# Patient Record
Sex: MALE | Race: WHITE | Employment: FULL TIME | URBAN - METROPOLITAN AREA
[De-identification: names, ages, dates, MRNs, and addresses within clinical notes are randomized per-mention and may not be internally consistent; named-entity substitution may affect disease eponyms.]

---

## 2019-02-14 ENCOUNTER — HOSPITAL ENCOUNTER (OUTPATIENT)
Dept: PULMONOLOGY | Age: 64
Discharge: HOME | End: 2019-02-14
Payer: COMMERCIAL

## 2019-02-14 ENCOUNTER — HOSPITAL ENCOUNTER (OUTPATIENT)
Dept: GENERAL RADIOLOGY | Age: 64
Discharge: HOME | End: 2019-02-16
Payer: COMMERCIAL

## 2019-02-14 ENCOUNTER — HOSPITAL ENCOUNTER (OUTPATIENT)
Dept: CT IMAGING | Age: 64
Discharge: HOME | End: 2019-02-14
Payer: COMMERCIAL

## 2019-02-14 ENCOUNTER — HOSPITAL ENCOUNTER (OUTPATIENT)
Age: 64
Discharge: HOME | End: 2019-02-16
Payer: COMMERCIAL

## 2019-02-14 DIAGNOSIS — J44.1 ACUTE EXACERBATION OF CHRONIC OBSTRUCTIVE PULMONARY DISEASE (HCC): ICD-10-CM

## 2019-02-14 DIAGNOSIS — R91.1 LUNG NODULE: ICD-10-CM

## 2019-02-14 PROCEDURE — 71046 X-RAY EXAM CHEST 2 VIEWS: CPT

## 2019-02-14 PROCEDURE — 71270 CT THORAX DX C-/C+: CPT

## 2019-02-14 PROCEDURE — 6360000004 HC RX CONTRAST MEDICATION: Performed by: RADIOLOGY

## 2019-02-14 PROCEDURE — 94726 PLETHYSMOGRAPHY LUNG VOLUMES: CPT

## 2019-02-14 PROCEDURE — 94060 EVALUATION OF WHEEZING: CPT

## 2019-02-14 PROCEDURE — 94729 DIFFUSING CAPACITY: CPT

## 2019-02-14 RX ADMIN — IOPAMIDOL 80 ML: 755 INJECTION, SOLUTION INTRAVENOUS at 09:16

## 2019-03-07 ENCOUNTER — HOSPITAL ENCOUNTER (OUTPATIENT)
Age: 64
Discharge: HOME | End: 2019-03-09
Payer: COMMERCIAL

## 2019-03-07 ENCOUNTER — HOSPITAL ENCOUNTER (OUTPATIENT)
Dept: GENERAL RADIOLOGY | Age: 64
Discharge: HOME | End: 2019-03-09
Payer: COMMERCIAL

## 2019-03-07 DIAGNOSIS — M25.571 RIGHT ANKLE PAIN, UNSPECIFIED CHRONICITY: ICD-10-CM

## 2019-03-07 PROCEDURE — 73610 X-RAY EXAM OF ANKLE: CPT

## 2019-10-08 ENCOUNTER — HOSPITAL ENCOUNTER (OUTPATIENT)
Dept: GENERAL RADIOLOGY | Age: 64
Discharge: HOME | End: 2019-10-10
Payer: COMMERCIAL

## 2019-10-08 ENCOUNTER — HOSPITAL ENCOUNTER (OUTPATIENT)
Age: 64
Discharge: HOME | End: 2019-10-10
Payer: COMMERCIAL

## 2019-10-08 DIAGNOSIS — M25.551 PAIN OF RIGHT HIP JOINT: ICD-10-CM

## 2019-10-08 DIAGNOSIS — M54.16 LUMBAR RADICULOPATHY: ICD-10-CM

## 2019-10-08 PROCEDURE — 73502 X-RAY EXAM HIP UNI 2-3 VIEWS: CPT

## 2019-10-08 PROCEDURE — 72100 X-RAY EXAM L-S SPINE 2/3 VWS: CPT

## 2019-10-08 PROCEDURE — 72220 X-RAY EXAM SACRUM TAILBONE: CPT

## 2019-10-29 ENCOUNTER — HOSPITAL ENCOUNTER (OUTPATIENT)
Dept: MRI IMAGING | Age: 64
Discharge: HOME | End: 2019-10-31
Payer: COMMERCIAL

## 2019-10-29 DIAGNOSIS — M54.17 RADICULOPATHY, LUMBOSACRAL REGION: ICD-10-CM

## 2019-10-29 PROCEDURE — 72148 MRI LUMBAR SPINE W/O DYE: CPT

## 2019-10-29 PROCEDURE — 72158 MRI LUMBAR SPINE W/O & W/DYE: CPT

## 2019-10-29 PROCEDURE — 6360000004 HC RX CONTRAST MEDICATION: Performed by: RADIOLOGY

## 2019-10-29 PROCEDURE — A9577 INJ MULTIHANCE: HCPCS | Performed by: RADIOLOGY

## 2019-10-29 RX ADMIN — GADOBENATE DIMEGLUMINE 15 ML: 529 INJECTION, SOLUTION INTRAVENOUS at 10:29

## 2019-11-07 ENCOUNTER — HOSPITAL ENCOUNTER (OUTPATIENT)
Dept: CT IMAGING | Age: 64
Discharge: HOME | End: 2019-11-07
Payer: COMMERCIAL

## 2019-11-07 DIAGNOSIS — Z00.00 ROUTINE GENERAL MEDICAL EXAMINATION AT A HEALTH CARE FACILITY: ICD-10-CM

## 2019-11-07 PROCEDURE — 70450 CT HEAD/BRAIN W/O DYE: CPT

## 2019-11-27 ENCOUNTER — INITIAL CONSULT (OUTPATIENT)
Dept: NEUROSURGERY | Age: 64
End: 2019-11-27
Payer: COMMERCIAL

## 2019-11-27 VITALS
SYSTOLIC BLOOD PRESSURE: 124 MMHG | DIASTOLIC BLOOD PRESSURE: 83 MMHG | HEIGHT: 70 IN | BODY MASS INDEX: 26.92 KG/M2 | HEART RATE: 59 BPM | WEIGHT: 188 LBS

## 2019-11-27 DIAGNOSIS — M54.9 MUSCULOSKELETAL BACK PAIN: Primary | ICD-10-CM

## 2019-11-27 DIAGNOSIS — M54.16 LUMBAR RADICULOPATHY, RIGHT: ICD-10-CM

## 2019-11-27 PROCEDURE — 99203 OFFICE O/P NEW LOW 30 MIN: CPT | Performed by: NEUROLOGICAL SURGERY

## 2019-11-27 RX ORDER — GABAPENTIN 300 MG/1
300 CAPSULE ORAL NIGHTLY
COMMUNITY
End: 2020-03-03 | Stop reason: ALTCHOICE

## 2019-11-27 ASSESSMENT — ENCOUNTER SYMPTOMS
BACK PAIN: 1
ABDOMINAL PAIN: 0
SHORTNESS OF BREATH: 0
PHOTOPHOBIA: 0
TROUBLE SWALLOWING: 0

## 2020-01-22 ENCOUNTER — TELEPHONE (OUTPATIENT)
Dept: PHYSICAL MEDICINE AND REHAB | Age: 65
End: 2020-01-22

## 2020-03-03 ENCOUNTER — OFFICE VISIT (OUTPATIENT)
Dept: ORTHOPEDIC SURGERY | Age: 65
End: 2020-03-03
Payer: COMMERCIAL

## 2020-03-03 VITALS — BODY MASS INDEX: 27.2 KG/M2 | HEIGHT: 70 IN | WEIGHT: 190 LBS

## 2020-03-03 PROCEDURE — 99214 OFFICE O/P EST MOD 30 MIN: CPT | Performed by: ORTHOPAEDIC SURGERY

## 2020-03-03 PROCEDURE — 20610 DRAIN/INJ JOINT/BURSA W/O US: CPT | Performed by: ORTHOPAEDIC SURGERY

## 2020-03-03 RX ORDER — ALBUTEROL SULFATE 2.5 MG/3ML
SOLUTION RESPIRATORY (INHALATION)
COMMUNITY
Start: 2020-01-28 | End: 2021-12-30 | Stop reason: SDUPTHER

## 2020-03-03 RX ORDER — TRIAMCINOLONE ACETONIDE 40 MG/ML
40 INJECTION, SUSPENSION INTRA-ARTICULAR; INTRAMUSCULAR ONCE
Status: COMPLETED | OUTPATIENT
Start: 2020-03-03 | End: 2020-03-03

## 2020-03-03 RX ORDER — KETOCONAZOLE 20 MG/G
CREAM TOPICAL
COMMUNITY
Start: 2019-12-12 | End: 2020-12-22

## 2020-03-03 RX ORDER — TIOTROPIUM BROMIDE 18 UG/1
CAPSULE ORAL; RESPIRATORY (INHALATION)
COMMUNITY
Start: 2020-02-26 | End: 2020-12-16

## 2020-03-03 RX ORDER — BUDESONIDE AND FORMOTEROL FUMARATE DIHYDRATE 160; 4.5 UG/1; UG/1
AEROSOL RESPIRATORY (INHALATION)
COMMUNITY
Start: 2020-01-28 | End: 2020-12-16

## 2020-03-03 RX ADMIN — TRIAMCINOLONE ACETONIDE 40 MG: 40 INJECTION, SUSPENSION INTRA-ARTICULAR; INTRAMUSCULAR at 12:43

## 2020-03-03 NOTE — PROGRESS NOTES
MG tablet, Take 1 tablet by mouth every 8 hours as needed for Pain, Disp: 60 tablet, Rfl: 2    Arginine 500 MG CAPS, Take 1 capsule by mouth 6 times daily, Disp: , Rfl:     Omega-3 Fatty Acids (FISH OIL) 1200 MG CAPS, Take by mouth 2 times daily, Disp: , Rfl:     Umeclidinium-Vilanterol 62.5-25 MCG/INH AEPB, Inhale into the lungs daily, Disp: , Rfl:     Multiple Vitamins-Minerals (THERAPEUTIC MULTIVITAMIN-MINERALS) tablet, Take 1 tablet by mouth 2 times daily, Disp: , Rfl:   No Known Allergies  Social History     Socioeconomic History    Marital status:      Spouse name: Not on file    Number of children: Not on file    Years of education: Not on file    Highest education level: Not on file   Occupational History    Not on file   Social Needs    Financial resource strain: Not on file    Food insecurity:     Worry: Not on file     Inability: Not on file    Transportation needs:     Medical: Not on file     Non-medical: Not on file   Tobacco Use    Smoking status: Former Smoker     Packs/day: 1.50     Start date: 1/13/2016    Smokeless tobacco: Never Used   Substance and Sexual Activity    Alcohol use: No    Drug use: No    Sexual activity: Not on file   Lifestyle    Physical activity:     Days per week: Not on file     Minutes per session: Not on file    Stress: Not on file   Relationships    Social connections:     Talks on phone: Not on file     Gets together: Not on file     Attends Bahai service: Not on file     Active member of club or organization: Not on file     Attends meetings of clubs or organizations: Not on file     Relationship status: Not on file    Intimate partner violence:     Fear of current or ex partner: Not on file     Emotionally abused: Not on file     Physically abused: Not on file     Forced sexual activity: Not on file   Other Topics Concern    Not on file   Social History Narrative    Not on file     History reviewed.  No pertinent family history. REVIEW OF SYSTEMS:     General/Constitution:  (-)weight loss, (-)fever, (-)chills, (-)weakness. Skin: (-) rash,(-) psoriasis,(-) eczema, (-)skin cancer. Musculoskeletal: (-) fractures,  (-) dislocations,(-) collagen vascular disease, (-) fibromyalgia, (-) multiple sclerosis, (-) muscular dystrophy, (-) RSD,(-) joint pain (-)swelling, (-) joint pain,swelling. Neurologic: (-) epilepsy, (-)seizures,(-) brain tumor,(-) TIA, (-)stroke, (-)headaches, (-)Parkinson disease,(-) memory loss, (-) LOC. Cardiovascular: (-) Chest pain, (-) swelling in legs/feet, (-) SOB, (-) cramping in legs/feet with walking. Respiratory: (-) SOB, (-) Coughing, (-) night sweats. GI: (-) nausea, (-) vomiting, (-) diarrhea, (-) blood in stool, (-) gastric ulcer. Psychiatric: (-) Depression, (-) Anxiety, (-) bipolar disease, (-) Alzheimer's Disease  Allergic/Immunologic: (-) allergies latex, (-) allergies metal, (-) skin sensitivity. Hematlogic: (-) anemia, (-) blood transfusion, (-) DVT/PE, (-) Clotting disorders    Subjective:    Constitution:  Ht 5' 10\" (1.778 m)   Wt 190 lb (86.2 kg)   BMI 27.26 kg/m²     Psycihatric:  The patient is alert and oriented x 3, appears to be stated age and in no distress. Respiratory:  Respiratory effort is not labored. Patient is not gasping. Palpation of the chest reveals no tactile fremitus. Skin:  Upon inspection: the skin appears warm, dry and intact. There is  a previous scar over the affected area. There is any cellulitis, lymphedema or cutaneous lesions noted in the lower extremities. Upon palpation there is no induration noted. Neurologic:  Gait: antalgic; Motor exam of the lower extremities show ; quadriceps, hamstrings, foot dorsi and plantar flexors intact R.  5/5 and L. 5/5. Deep tendon reflexes are 2/4 at the knees and 2/4 at the ankles with strong extensor hallicus longus motor strength bilaterally.  Sensory to both feet is intact to all sensory root.    Cardiovascular: The vascular exam is normal and is well perfused to distal extremities. Distal pulses DP/PT: R. 2+; L. 2+. There is cap refill noted less than two seconds in all digits. There is not edema of the bilateral lower extremities. There is not varicosities noted in the distal extremities. Lymph:  Upon palpation,  there is no lymphadenopathy noted in bilateral lower extremities. Musculoskeletal:  Gait: antalgic; examination of the nails and digits reveal no cyanosis or clubbing. Lumbar exam:  On visual inspection, there is not deformity of the spine. full range of motion, no tenderness, palpable spasm or pain on motion. Special tests: Straight Leg Raise negative, Jason test negative. Hip exam:   Upon inspection, there is not deformity noted. Upon palpation there is not tenderness. ROM: is  full and symmetrical.   Strength: Hip Flexors 5/5; Hip Abductors 5/5; Hip Adduction 5/5. Knee exam:  Right knee exam shows;  range of motion of R. Knee is 0 to 120, and L. Knee is 0 to 120. The patient does have  pain on motion, effusion is mild, there is tenderness over the  medial region, there are not any masses, there is not ligamentous instability, there is not  deformity noted. Knee exam: right positive for moderate crepitations, some mild tenderness laxity is not noted with  stress. There is not a popliteal cyst.    R. Knee:  Lachman's negative, Anterior Drawer negative, Posterior Drawer negative  Nargis's negative, Thallasy  negative,   PF grind test negative, Apprehension test negative, Patellar J sign  negative  L. Knee:  Lachman's negative, Anterior Drawer negative, Posterior Drawer negative  Nargis's negative, Thallasy  negative,   PF grind test negative, Apprehension test negative,  Patellar J sign  negative    Xray Exam:     FINDINGS: Mild tricompartmental degenerative changes are noted,   greatest in the medial compartment. There is no joint effusion.  There

## 2020-03-03 NOTE — TELEPHONE ENCOUNTER
Doing some follow up on some outstanding epidural orders (12/9/19) Attempted to contact patient at number listed, unable to leave message. No other contact information listed.

## 2020-03-06 VITALS
DIASTOLIC BLOOD PRESSURE: 92 MMHG | WEIGHT: 194 LBS | BODY MASS INDEX: 28.73 KG/M2 | HEIGHT: 69 IN | SYSTOLIC BLOOD PRESSURE: 150 MMHG | HEART RATE: 65 BPM

## 2020-03-06 RX ORDER — GABAPENTIN 300 MG/1
300 CAPSULE ORAL NIGHTLY
COMMUNITY
End: 2020-12-22

## 2020-12-14 NOTE — PROGRESS NOTES
[facility] Cardiology Progress Note  Dr. Kylah Asencio      Referring Physician: Joo Briseno MD  CHIEF COMPLAINT:   Chief Complaint   Patient presents with    Follow-up     Annual.  Patient denies any cp sob or dizziness. COPD-patient has some SOB. HISTORY OF PRESENT ILLNESS:   Patient is 72year old male who was referred to Cardiology for the evaluation and treatment of shortness of breath. Here for follow up appointment. For the last couple of weeks patient noticed worsening shortness of breath, started to gradually, has been persistent daily, no other associated symptoms, little bit different from shortness of breath) COPD, patient denies any chest pain, no lightheadedness, no dizziness, no palpitations, no pedal edema, no PND, no orthopnea, no syncope, no presyncopal episodes. His 6years old daughter who was recently diagnosed with type 1 diabetes mellitus        Past Medical History:   Diagnosis Date    Arthritis     Asthma     Breathing difficulty     COPD (chronic obstructive pulmonary disease) ([address]     Non-rheumatic tricuspid valve insufficiency     Nonrheumatic mitral (valve) insufficiency          Past Surgical History:   Procedure Laterality Date    APPENDECTOMY      HERNIA REPAIR      OTHER SURGICAL HISTORY      right wrist carpal tunnel release, right long trigger finger release    OTHER SURGICAL HISTORY Right 01/20/2016    right shoulder arthroscopy acromioplasty with decompression rotator cuff repair     SHOULDER ARTHROSCOPY  3/4/11    left rotator cuff repair         Current Outpatient Medications   Medication Sig Dispense Refill    omeprazole (PRILOSEC) 20 MG delayed release capsule Take 20 mg by mouth daily      gabapentin (NEURONTIN) 300 MG capsule Take 300 mg by mouth nightly.       albuterol (PROVENTIL) (2.5 MG/3ML) 0.083% nebulizer solution       ketoconazole (NIZORAL) 2 % cream  ibuprofen (ADVIL;MOTRIN) 800 MG tablet Take 1 tablet by mouth every 8 hours as needed for Pain 60 tablet 2    Arginine 500 MG CAPS Take 1 capsule by mouth 6 times daily      Omega-3 Fatty Acids (FISH OIL) 1200 MG CAPS Take by mouth 2 times daily      Umeclidinium-Vilanterol 62.5-25 MCG/INH AEPB Inhale into the lungs daily      Multiple Vitamins-Minerals (THERAPEUTIC MULTIVITAMIN-MINERALS) tablet Take 1 tablet by mouth 2 times daily       No current facility-administered medications for this visit.           Allergies as of 12/16/2020    (No Known Allergies)       Social History     Socioeconomic History    Marital status:      Spouse name: Not on file    Number of children: Not on file    Years of education: Not on file    Highest education level: Not on file   Occupational History    Not on file   Social Needs    Financial resource strain: Not on file    Food insecurity     Worry: Not on file     Inability: Not on file    Transportation needs     Medical: Not on file     Non-medical: Not on file   Tobacco Use    Smoking status: Former Smoker     Packs/day: 1.50     Start date: 1/13/2016    Smokeless tobacco: Never Used   Substance and Sexual Activity    Alcohol use: No     Comment: 6 cups a day     Drug use: No    Sexual activity: Not on file   Lifestyle    Physical activity     Days per week: Not on file     Minutes per session: Not on file    Stress: Not on file   Relationships    Social connections     Talks on phone: Not on file     Gets together: Not on file     Attends Congregation service: Not on file     Active member of club or organization: Not on file     Attends meetings of clubs or organizations: Not on file     Relationship status: Not on file    Intimate partner violence     Fear of current or ex partner: Not on file     Emotionally abused: Not on file     Physically abused: Not on file     Forced sexual activity: Not on file   Other Topics Concern    Not on file Social History Narrative    Not on file       History reviewed. No pertinent family history. REVIEW OF SYSTEMS:     CONSTITUTIONAL:  negative for  fevers, chills, sweats and fatigue  HEENT:  negative for  tinnitus, earaches, nasal congestion and epistaxis  RESPIRATORY:  negative for  dry cough, cough with sputum, dyspnea, wheezing and hemoptysis  GASTROINTESTINAL:  negative for nausea, vomiting, diarrhea, constipation, pruritus and jaundice  HEMATOLOGIC/LYMPHATIC:  negative for easy bruising, bleeding, lymphadenopathy and petechiae  ENDOCRINE:  negative for heat intolerance, cold intolerance, tremor, hair loss and diabetic symptoms including neither polyuria nor polydipsia nor blurred vision  MUSCULOSKELETAL:  negative for  myalgias, arthralgias, joint swelling, stiff joints and decreased range of motion  NEUROLOGICAL:  negative for memory problems, speech problems, visual disturbance, dysphagia, weakness and numbness      PHYSICAL EXAM:   Constitutional:  Awake, alert cooperative, no apparent distress, and appears stated age. HEENT:  Moist and pink mucous membranes, normocephalic, without obvious abnormality, atraumatic, normal ears and nose. NECK:  Supple, symmetrical, trachea midline, no JVD, no adenopathy, thyroid symmetric, not enlarged and no tenderness, good carotid upstroke bilaterally, no carotid bruit, skin normal.   LUNGS: No increased work of breathing, good air exchange, clear to auscultation bilaterally, no crackles or wheezing. Cardiovascular: Normal apical impulse, regular rate and rhythm, normal S1 and S2, no S3 or S4, 2/6 systolic murmur at the apex, 2/6 diastolic murmur at the right upper sternal border, 2/6 systolic murmur at the left lower sternal border, no pedal edema, good carotid upstroke bilaterally, no carotid bruit, no JVD, no abdominal pulsating masses. ABDOMEN: Soft, nontender, no hepatomegaly, no splenomegaly, bowel sound positive. CHEST:  Expands symmetrically, nontender to palpation. Musculoskeletal:  No clubbing or cyanosis. No redness, warmth, or swelling of the joints. Neurological: Alert, awake, and oriented X3   SKIN: No bruises, no bleeding, normal skin color, texture, turgor and no redness, warmth or swelling. BP (!) 116/52 (Site: Right Upper Arm, Position: Sitting, Cuff Size: Large Adult)   Pulse 64   Ht 5' 10\" (1.778 m)   Wt 196 lb (88.9 kg)   BMI 28.12 kg/m²     DATA:   I personally reviewed the visit EKG with the following interpretation: Sinus rhythm, right bundle branch block    EKG - (11/15/2019) I personally reviewed the EKG with the following interpretation: Sinus rhythm    ECHO: 3/12/2019) Normal let ventricular systolic function with normal diastolic function. Mild mitral valve regurgitation. Mild tricuspid valve regurgitation with normal RVSP.     Stress Test:   Angiography:    Cardiology Labs: BMP:    Lab Results   Component Value Date     12/15/2017    K 4.3 12/15/2017     12/15/2017    CO2 27 12/15/2017    BUN 12 12/15/2017    CREATININE 0.9 12/15/2017     CMP:    Lab Results   Component Value Date     12/15/2017    K 4.3 12/15/2017     12/15/2017    CO2 27 12/15/2017    BUN 12 12/15/2017    CREATININE 0.9 12/15/2017    PROT 6.8 12/15/2017     CBC:    Lab Results   Component Value Date    WBC 7.4 12/15/2017    RBC 5.09 12/15/2017    HGB 14.6 12/15/2017    HCT 43.5 12/15/2017    MCV 85.5 12/15/2017    RDW 13.8 12/15/2017     12/15/2017     PT/INR:  No results found for: PTINR  PT/INR Warfarin:  No components found for: PTPATWAR, PTINRWAR  PTT:  No results found for: APTT  PTT Heparin:  No components found for: APTTHEP  Magnesium:  No results found for: MG  TSH:  No results found for: TSH  TROPONIN:  No components found for: TROP  BNP:  No results found for: BNP  FASTING LIPID PANEL:    Lab Results   Component Value Date    CHOL 184 10/05/2010    HDL 52 12/15/2017 TRIG 117 10/05/2010     No orders to display     I have personally reviewed the laboratory, cardiac diagnostic and radiographic testing as outlined above:      IMPRESSION:  1: Shortness of breath: Exertional, equivalent angina?,  Will schedule for regular exercise stress test  2: Nonrheumatic mitral (valve) insufficiency: Mild             3:  Nonrheumatic tricuspid (valve) insufficiency: Mild            4: Chronic obstructive pulmonary disease, unspecified              5:  Family history of ischemic heart disease and other diseases: Father had his first heart attack at age 61               RECOMMENDATIONS:   1. Regular exercise stress test  2. Patient was strongly advised to call 911 if symptoms recur or get worse for any reason  3. Continue current treatment  4. Follow-up with  as scheduled  5. Follow-up with Dr. Susan Dietrich after his test    I have reviewed my findings and recommendations with patient    Electronically signed by Emiliano Sherwood MD on 12/16/2020 at 9:03 AM    NOTE: This report was transcribed using voice recognition software.  Every effort was made to ensure accuracy; however, inadvertent computerized transcription errors may be present

## 2020-12-16 ENCOUNTER — OFFICE VISIT (OUTPATIENT)
Dept: CARDIOLOGY CLINIC | Age: 65
End: 2020-12-16
Payer: COMMERCIAL

## 2020-12-16 ENCOUNTER — TELEPHONE (OUTPATIENT)
Dept: CARDIOLOGY | Age: 65
End: 2020-12-16

## 2020-12-16 VITALS
HEIGHT: 70 IN | SYSTOLIC BLOOD PRESSURE: 116 MMHG | DIASTOLIC BLOOD PRESSURE: 52 MMHG | WEIGHT: 196 LBS | HEART RATE: 64 BPM | BODY MASS INDEX: 28.06 KG/M2

## 2020-12-16 PROCEDURE — 93000 ELECTROCARDIOGRAM COMPLETE: CPT | Performed by: INTERNAL MEDICINE

## 2020-12-16 PROCEDURE — 99214 OFFICE O/P EST MOD 30 MIN: CPT | Performed by: INTERNAL MEDICINE

## 2020-12-16 RX ORDER — OMEPRAZOLE 20 MG/1
20 CAPSULE, DELAYED RELEASE ORAL DAILY
COMMUNITY
End: 2021-05-06 | Stop reason: SINTOL

## 2020-12-17 ENCOUNTER — HOSPITAL ENCOUNTER (OUTPATIENT)
Dept: CARDIOLOGY | Age: 65
Discharge: HOME | End: 2020-12-17
Payer: COMMERCIAL

## 2020-12-17 ENCOUNTER — HOSPITAL ENCOUNTER (OUTPATIENT)
Age: 65
Discharge: HOME | End: 2020-12-19
Payer: COMMERCIAL

## 2020-12-17 VITALS
SYSTOLIC BLOOD PRESSURE: 130 MMHG | HEART RATE: 58 BPM | OXYGEN SATURATION: 99 % | HEIGHT: 70 IN | TEMPERATURE: 96.8 F | DIASTOLIC BLOOD PRESSURE: 72 MMHG | BODY MASS INDEX: 28.06 KG/M2 | WEIGHT: 196 LBS | RESPIRATION RATE: 20 BRPM

## 2020-12-17 PROCEDURE — U0003 INFECTIOUS AGENT DETECTION BY NUCLEIC ACID (DNA OR RNA); SEVERE ACUTE RESPIRATORY SYNDROME CORONAVIRUS 2 (SARS-COV-2) (CORONAVIRUS DISEASE [COVID-19]), AMPLIFIED PROBE TECHNIQUE, MAKING USE OF HIGH THROUGHPUT TECHNOLOGIES AS DESCRIBED BY CMS-2020-01-R: HCPCS

## 2020-12-17 PROCEDURE — 78452 HT MUSCLE IMAGE SPECT MULT: CPT

## 2020-12-17 PROCEDURE — 93017 CV STRESS TEST TRACING ONLY: CPT

## 2020-12-17 RX ORDER — FLUTICASONE FUROATE, UMECLIDINIUM BROMIDE AND VILANTEROL TRIFENATATE 100; 62.5; 25 UG/1; UG/1; UG/1
POWDER RESPIRATORY (INHALATION)
COMMUNITY
Start: 2020-11-30 | End: 2021-05-06 | Stop reason: SDUPTHER

## 2020-12-17 NOTE — PROCEDURES
[address] Annieluna Holes, [facility]. [facility], [facility]  [phone]    Exercise Stress Study       Name: Radha Penldeton Account Number:  [de-identified]      :  1955          Sex: male         Date of Study:  2020    Height: 5' 10\" (177.8 cm)          Weight: 196 lb (88.9 kg)    Ordering Provider: Pablo Bartlett          PCP: Gloria Dietrich MD    Cardiologist: Pablo Bartlett              Interpreting Physician: Pablo Bartlett    Indication:   Detecting the presence and location of coronary artery disease    Clinical History:   Patient has no known history of coronary artery disease. Resting ECG:    MA int 194m sec, QRS int 138m sec, QT int 472m sec; HR 58 bpm  Normal sinus rhythm    Exercise: The patient exercised using a Luis E protocol, completing 4:11 minutes and reaching an estimated work load of 1.16 metabolic equivalents (METS). Resting HR was 58. Peak exercise heart rate was 139 ( 89% of maximum predicted heart rate for age). Baseline /72. Peak exercise /66. The blood pressure response to exercise was hypertensive      Exercise was terminated due to increased dyspnea and EKG changes. The patient experienced Shortness of breath and tightness of the chest with exercise. Pulse oximetry was used to monitor oxygen saturation during the stress test.  The study was performed on Room Air. The resting pulse oximeter was 99%. The post stress O2 saturation seen during exercise was 96 %. Exercise ECG:   The patient demonstrated occasional PVC's during exercise. With exercise, the test was abnormal.  With exercise up to 1 mm of downsloping ST depression was noted in leads I, II and III horizontal ST depression in lead V4, V5, V6 with initial changes beginning at 3 minutes into exercise, at a heart rate of 137. These changes these changes persisted 3 minutes into recovery period.   The predictive value for ischemia was intermediate     Impression:    1. Exercise EKG was abnormal with Downsloping ST depression in the inferior leads, horizontal ST depression in the anterolateral leads, with high predictive value for ischemia. 2. The patient experienced Shortness of breath with some tightness in the chest with exercise. 3. Gusman treadmill score was -9 implying intermediate risk of acute ischemic events. 4. Exercise capacity was below average. Thank you for sending your patient to this [employer].     Electronically signed by Kennedi Sheridan MD on 12/17/20 at 7:24 PM EST

## 2020-12-18 ENCOUNTER — TELEPHONE (OUTPATIENT)
Dept: CARDIOLOGY CLINIC | Age: 65
End: 2020-12-18

## 2020-12-18 NOTE — TELEPHONE ENCOUNTER
Spoke to [facility] - no prior authorization is needed for heart catheterization on December 22, 2020.       Call reference Neva Youssef [phone]

## 2020-12-19 LAB
SARS-COV-2: NOT DETECTED
SOURCE: NORMAL

## 2020-12-21 ENCOUNTER — HOSPITAL ENCOUNTER (OUTPATIENT)
Age: 65
Discharge: HOME | End: 2020-12-21
Payer: COMMERCIAL

## 2020-12-21 ENCOUNTER — HOSPITAL ENCOUNTER (OUTPATIENT)
Age: 65
Discharge: HOME | End: 2020-12-23
Payer: COMMERCIAL

## 2020-12-21 LAB
ABO/RH: NORMAL
ANION GAP SERPL CALCULATED.3IONS-SCNC: 7 MMOL/L (ref 7–16)
ANTIBODY SCREEN: NORMAL
BILIRUBIN URINE: NEGATIVE
BLOOD, URINE: NEGATIVE
BUN BLDV-MCNC: 14 MG/DL (ref 8–23)
CALCIUM SERPL-MCNC: 9.4 MG/DL (ref 8.6–10.2)
CHLORIDE BLD-SCNC: 100 MMOL/L (ref 98–107)
CLARITY: CLEAR
CO2: 29 MMOL/L (ref 22–29)
COLOR: YELLOW
CREAT SERPL-MCNC: 0.9 MG/DL (ref 0.7–1.2)
GFR AFRICAN AMERICAN: >60
GFR NON-AFRICAN AMERICAN: >60 ML/MIN/1.73
GLUCOSE BLD-MCNC: 100 MG/DL (ref 74–99)
GLUCOSE URINE: NEGATIVE MG/DL
HCT VFR BLD CALC: 41.4 % (ref 37–54)
HEMOGLOBIN: 13 G/DL (ref 12.5–16.5)
KETONES, URINE: NEGATIVE MG/DL
LEUKOCYTE ESTERASE, URINE: NEGATIVE
MCH RBC QN AUTO: 26.4 PG (ref 26–35)
MCHC RBC AUTO-ENTMCNC: 31.4 % (ref 32–34.5)
MCV RBC AUTO: 84.1 FL (ref 80–99.9)
NITRITE, URINE: NEGATIVE
PDW BLD-RTO: 14.1 FL (ref 11.5–15)
PH UA: 6.5 (ref 5–9)
PLATELET # BLD: 229 E9/L (ref 130–450)
PMV BLD AUTO: 8.9 FL (ref 7–12)
POTASSIUM SERPL-SCNC: 4.5 MMOL/L (ref 3.5–5)
PROTEIN UA: NEGATIVE MG/DL
RBC # BLD: 4.92 E12/L (ref 3.8–5.8)
SODIUM BLD-SCNC: 136 MMOL/L (ref 132–146)
SPECIFIC GRAVITY UA: 1.01 (ref 1–1.03)
UROBILINOGEN, URINE: 0.2 E.U./DL
WBC # BLD: 7.4 E9/L (ref 4.5–11.5)

## 2020-12-21 PROCEDURE — 80048 BASIC METABOLIC PNL TOTAL CA: CPT

## 2020-12-21 PROCEDURE — 81003 URINALYSIS AUTO W/O SCOPE: CPT

## 2020-12-21 PROCEDURE — 85027 COMPLETE CBC AUTOMATED: CPT

## 2020-12-21 PROCEDURE — 36415 COLL VENOUS BLD VENIPUNCTURE: CPT

## 2020-12-22 ENCOUNTER — HOSPITAL ENCOUNTER (OUTPATIENT)
Dept: GENERAL RADIOLOGY | Age: 65
Discharge: HOME | End: 2020-12-24
Payer: COMMERCIAL

## 2020-12-22 ENCOUNTER — HOSPITAL ENCOUNTER (OUTPATIENT)
Dept: CARDIAC CATH/INVASIVE PROCEDURES | Age: 65
Setting detail: OBSERVATION
Discharge: HOME | End: 2020-12-23
Attending: FAMILY MEDICINE | Admitting: FAMILY MEDICINE
Payer: COMMERCIAL

## 2020-12-22 PROBLEM — I25.10 CAD IN NATIVE ARTERY: Status: ACTIVE | Noted: 2020-12-22

## 2020-12-22 PROCEDURE — 6370000000 HC RX 637 (ALT 250 FOR IP): Performed by: INTERNAL MEDICINE

## 2020-12-22 PROCEDURE — G0379 DIRECT REFER HOSPITAL OBSERV: HCPCS

## 2020-12-22 PROCEDURE — C1887 CATHETER, GUIDING: HCPCS

## 2020-12-22 PROCEDURE — 2500000003 HC RX 250 WO HCPCS

## 2020-12-22 PROCEDURE — 71045 X-RAY EXAM CHEST 1 VIEW: CPT

## 2020-12-22 PROCEDURE — C1769 GUIDE WIRE: HCPCS

## 2020-12-22 PROCEDURE — G0378 HOSPITAL OBSERVATION PER HR: HCPCS

## 2020-12-22 PROCEDURE — 93458 L HRT ARTERY/VENTRICLE ANGIO: CPT | Performed by: INTERNAL MEDICINE

## 2020-12-22 PROCEDURE — 2580000003 HC RX 258: Performed by: INTERNAL MEDICINE

## 2020-12-22 PROCEDURE — 93005 ELECTROCARDIOGRAM TRACING: CPT | Performed by: INTERNAL MEDICINE

## 2020-12-22 PROCEDURE — 2709999900 HC NON-CHARGEABLE SUPPLY

## 2020-12-22 PROCEDURE — C1725 CATH, TRANSLUMIN NON-LASER: HCPCS

## 2020-12-22 PROCEDURE — C9600 PERC DRUG-EL COR STENT SING: HCPCS | Performed by: INTERNAL MEDICINE

## 2020-12-22 PROCEDURE — 92928 PRQ TCAT PLMT NTRAC ST 1 LES: CPT | Performed by: INTERNAL MEDICINE

## 2020-12-22 PROCEDURE — 6370000000 HC RX 637 (ALT 250 FOR IP)

## 2020-12-22 PROCEDURE — C1894 INTRO/SHEATH, NON-LASER: HCPCS

## 2020-12-22 PROCEDURE — 6360000002 HC RX W HCPCS

## 2020-12-22 PROCEDURE — C1874 STENT, COATED/COV W/DEL SYS: HCPCS

## 2020-12-22 PROCEDURE — 85347 COAGULATION TIME ACTIVATED: CPT

## 2020-12-22 PROCEDURE — 2140000000 HC CCU INTERMEDIATE R&B

## 2020-12-22 RX ORDER — ACETAMINOPHEN 325 MG/1
650 TABLET ORAL EVERY 4 HOURS PRN
Status: DISCONTINUED | OUTPATIENT
Start: 2020-12-22 | End: 2020-12-22 | Stop reason: ALTCHOICE

## 2020-12-22 RX ORDER — SODIUM CHLORIDE 0.9 % (FLUSH) 0.9 %
10 SYRINGE (ML) INJECTION PRN
Status: DISCONTINUED | OUTPATIENT
Start: 2020-12-22 | End: 2020-12-23 | Stop reason: HOSPADM

## 2020-12-22 RX ORDER — BUDESONIDE 0.25 MG/2ML
0.25 INHALANT ORAL 2 TIMES DAILY
Status: DISCONTINUED | OUTPATIENT
Start: 2020-12-22 | End: 2020-12-23 | Stop reason: HOSPADM

## 2020-12-22 RX ORDER — ACETAMINOPHEN 650 MG/1
650 SUPPOSITORY RECTAL EVERY 6 HOURS PRN
Status: DISCONTINUED | OUTPATIENT
Start: 2020-12-22 | End: 2020-12-23 | Stop reason: HOSPADM

## 2020-12-22 RX ORDER — SODIUM CHLORIDE 9 MG/ML
INJECTION, SOLUTION INTRAVENOUS CONTINUOUS
Status: DISCONTINUED | OUTPATIENT
Start: 2020-12-22 | End: 2020-12-23 | Stop reason: HOSPADM

## 2020-12-22 RX ORDER — POLYETHYLENE GLYCOL 3350 17 G/17G
17 POWDER, FOR SOLUTION ORAL DAILY PRN
Status: DISCONTINUED | OUTPATIENT
Start: 2020-12-22 | End: 2020-12-23 | Stop reason: HOSPADM

## 2020-12-22 RX ORDER — SODIUM CHLORIDE 0.9 % (FLUSH) 0.9 %
10 SYRINGE (ML) INJECTION EVERY 12 HOURS SCHEDULED
Status: DISCONTINUED | OUTPATIENT
Start: 2020-12-22 | End: 2020-12-23 | Stop reason: HOSPADM

## 2020-12-22 RX ORDER — M-VIT,TX,IRON,MINS/CALC/FOLIC 27MG-0.4MG
1 TABLET ORAL 2 TIMES DAILY
Status: DISCONTINUED | OUTPATIENT
Start: 2020-12-22 | End: 2020-12-23 | Stop reason: HOSPADM

## 2020-12-22 RX ORDER — ACETAMINOPHEN 325 MG/1
650 TABLET ORAL EVERY 6 HOURS PRN
Status: DISCONTINUED | OUTPATIENT
Start: 2020-12-22 | End: 2020-12-23 | Stop reason: HOSPADM

## 2020-12-22 RX ORDER — ATORVASTATIN CALCIUM 20 MG/1
20 TABLET, FILM COATED ORAL NIGHTLY
Status: DISCONTINUED | OUTPATIENT
Start: 2020-12-22 | End: 2020-12-23 | Stop reason: HOSPADM

## 2020-12-22 RX ORDER — PANTOPRAZOLE SODIUM 40 MG/1
40 TABLET, DELAYED RELEASE ORAL DAILY
Status: DISCONTINUED | OUTPATIENT
Start: 2020-12-22 | End: 2020-12-23 | Stop reason: HOSPADM

## 2020-12-22 RX ORDER — PROMETHAZINE HYDROCHLORIDE 25 MG/1
12.5 TABLET ORAL EVERY 6 HOURS PRN
Status: DISCONTINUED | OUTPATIENT
Start: 2020-12-22 | End: 2020-12-23 | Stop reason: HOSPADM

## 2020-12-22 RX ORDER — ALBUTEROL SULFATE 2.5 MG/3ML
2.5 SOLUTION RESPIRATORY (INHALATION) EVERY 6 HOURS PRN
Status: DISCONTINUED | OUTPATIENT
Start: 2020-12-22 | End: 2020-12-23 | Stop reason: HOSPADM

## 2020-12-22 RX ORDER — ONDANSETRON 2 MG/ML
4 INJECTION INTRAMUSCULAR; INTRAVENOUS EVERY 6 HOURS PRN
Status: DISCONTINUED | OUTPATIENT
Start: 2020-12-22 | End: 2020-12-22 | Stop reason: SDUPTHER

## 2020-12-22 RX ORDER — ARFORMOTEROL TARTRATE 15 UG/2ML
15 SOLUTION RESPIRATORY (INHALATION) 2 TIMES DAILY
Status: DISCONTINUED | OUTPATIENT
Start: 2020-12-22 | End: 2020-12-23 | Stop reason: HOSPADM

## 2020-12-22 RX ORDER — ASPIRIN 81 MG/1
81 TABLET, CHEWABLE ORAL DAILY
Status: DISCONTINUED | OUTPATIENT
Start: 2020-12-23 | End: 2020-12-23 | Stop reason: HOSPADM

## 2020-12-22 RX ORDER — IBUPROFEN 400 MG/1
800 TABLET ORAL EVERY 8 HOURS PRN
Status: DISCONTINUED | OUTPATIENT
Start: 2020-12-22 | End: 2020-12-23 | Stop reason: HOSPADM

## 2020-12-22 RX ORDER — ONDANSETRON 2 MG/ML
4 INJECTION INTRAMUSCULAR; INTRAVENOUS EVERY 6 HOURS PRN
Status: DISCONTINUED | OUTPATIENT
Start: 2020-12-22 | End: 2020-12-23 | Stop reason: HOSPADM

## 2020-12-22 RX ORDER — SODIUM CHLORIDE 9 MG/ML
INJECTION, SOLUTION INTRAVENOUS ONCE
Status: COMPLETED | OUTPATIENT
Start: 2020-12-22 | End: 2020-12-22

## 2020-12-22 RX ORDER — ASPIRIN 325 MG
325 TABLET ORAL ONCE
Status: COMPLETED | OUTPATIENT
Start: 2020-12-22 | End: 2020-12-22

## 2020-12-22 RX ORDER — NITROGLYCERIN 0.4 MG/1
0.4 TABLET SUBLINGUAL EVERY 5 MIN PRN
Status: DISCONTINUED | OUTPATIENT
Start: 2020-12-22 | End: 2020-12-23 | Stop reason: HOSPADM

## 2020-12-22 RX ADMIN — SODIUM CHLORIDE: 9 INJECTION, SOLUTION INTRAVENOUS at 19:41

## 2020-12-22 RX ADMIN — ATORVASTATIN CALCIUM 20 MG: 20 TABLET, FILM COATED ORAL at 21:58

## 2020-12-22 RX ADMIN — Medication 1 TABLET: at 21:57

## 2020-12-22 RX ADMIN — ASPIRIN 325 MG: 325 TABLET, FILM COATED ORAL at 09:42

## 2020-12-22 RX ADMIN — SODIUM CHLORIDE: 9 INJECTION, SOLUTION INTRAVENOUS at 09:42

## 2020-12-22 ASSESSMENT — PAIN SCALES - GENERAL
PAINLEVEL_OUTOF10: 0

## 2020-12-22 NOTE — H&P
Hospitalist History & Physical      PCP: Houston Abarca MD    Date of Admission: 12/22/2020    Date of Service: Pt seen/examined on 12/22/2020 and is admitted to Inpatient with expected LOS greater than two midnights due to medical therapy. Chief Complaint:      History Of Present Illness:    Mr. Marianne Mejia, a 72y.o. year old male  who  has a past medical history of Arthritis, Asthma, Breathing difficulty, CAD (coronary artery disease), COPD (chronic obstructive pulmonary disease) ([address] Non-rheumatic tricuspid valve insufficiency, and Nonrheumatic mitral (valve) insufficiency. Briefly this is a 70-year-old male who is admitted for observation status post cardiac catheterization. Patient status post successful PCI x2 drug-eluting stents to mid to distal left circumflex patient's chief complaint was exertional dyspnea and abnormal stress test.  Patient reports he has a history of COPD, CAD, asthma      Past Medical History:        Diagnosis Date    Arthritis     Asthma     Breathing difficulty     CAD (coronary artery disease)     COPD (chronic obstructive pulmonary disease) [address] [address]     Non-rheumatic tricuspid valve insufficiency     Nonrheumatic mitral (valve) insufficiency        Past Surgical History:        Procedure Laterality Date    APPENDECTOMY      COLONOSCOPY      CORONARY ANGIOPLASTY WITH STENT PLACEMENT  12/22/2020    DR. Mandel Resolute [place] DUANE 2.25x18 Mid LAD, Resolute [place] DUANE 2.5x18 Mid Cx. EF 65%    HERNIA REPAIR      OTHER SURGICAL HISTORY      right wrist carpal tunnel release, right long trigger finger release    OTHER SURGICAL HISTORY Right 01/20/2016    right shoulder arthroscopy acromioplasty with decompression rotator cuff repair     SHOULDER ARTHROSCOPY  3/4/11    left rotator cuff repair       Medications Prior to Admission:      Prior to Admission medications    Medication Sig Start Date End Date Taking?  Authorizing Provider   Philip Gomez [phone] MCG/INH AEPB inhale 1 puff by mouth and INTO THE LUNGS once daily 11/30/20  Yes Historical Provider, MD   omeprazole (PRILOSEC) 20 MG delayed release capsule Take 20 mg by mouth daily   Yes Historical Provider, MD   albuterol (PROVENTIL) (2.5 MG/3ML) 0.083% nebulizer solution  1/28/20  Yes Historical Provider, MD   ibuprofen (ADVIL;MOTRIN) 800 MG tablet Take 1 tablet by mouth every 8 hours as needed for Pain 5/2/16  Yes Kandy Fowler DO   Arginine 500 MG CAPS Take 1 capsule by mouth 6 times daily   Yes Historical Provider, MD   Omega-3 Fatty Acids (FISH OIL) 1200 MG CAPS Take by mouth 2 times daily   Yes Historical Provider, MD   Multiple Vitamins-Minerals (THERAPEUTIC MULTIVITAMIN-MINERALS) tablet Take 1 tablet by mouth 2 times daily   Yes Historical Provider, MD       Allergies:  Patient has no known allergies. Social History:    TOBACCO:   reports that he has quit smoking. He started smoking about 4 years ago. He has a 60.00 pack-year smoking history. He has never used smokeless tobacco.  ETOH:   reports no history of alcohol use. Family History:    Reviewed in detail and negative for DM, CAD, Cancer, CVA. Positive as follows\"      Problem Relation Age of Onset    Heart Disease Father     Heart Attack Father     High Blood Pressure Father     High Cholesterol Father     Diabetes Father     Arrhythmia Sister        REVIEW OF SYSTEMS:   Pertinent positives as noted in the HPI. All other systems reviewed and negative. PHYSICAL EXAM:  /73   Pulse 69   Temp 99.1 °F (37.3 °C)   Ht 5' 10\" (1.778 m)   Wt 196 lb (88.9 kg)   SpO2 98%   BMI 28.12 kg/m²   General appearance: No apparent distress, appears stated age and cooperative. HEENT: Normal cephalic, atraumatic without obvious deformity. Pupils equal, round, and reactive to light. Extra ocular muscles intact. Conjunctivae/corneas clear. Neck: Supple, with full range of motion. No jugular venous distention. Trachea midline.   Respiratory: Diminished breath sounds bilaterally but no respiratory distress or increased work of breathing  Cardiovascular: Regular rate rhythm with normal S1-S2  Abdomen: Soft, nontender  Musculoskeletal: No clubbing, cyanosis, edema of bilateral lower extremities. Brisk capillary refill. Skin: Normal skin color. No rashes or lesions. Neurologic:  Neurovascularly intact without any focal sensory/motor deficits. Cranial nerves: II-XII intact, grossly non-focal.  Psychiatric: Alert and oriented, thought content appropriate, normal insight    Reviewed EKG and CXR personally      CBC:   Recent Labs     12/21/20  0814   WBC 7.4   RBC 4.92   HGB 13.0   HCT 41.4   MCV 84.1   RDW 14.1        BMP:   Recent Labs     12/21/20  0814      K 4.5      CO2 29   BUN 14   CREATININE 0.9     LFT:  No results for input(s): PROT, ALB, ALKPHOS, ALT, AST, BILITOT, AMYLASE, LIPASE in the last 72 hours. CE:  No results for input(s): Les Day in the last 72 hours. PT/INR: No results for input(s): INR, APTT in the last 72 hours. BNP: No results for input(s): BNP in the last 72 hours.   ESR:   Lab Results   Component Value Date    SEDRATE 6 12/15/2017     CRP: No results found for: CRP  D Dimer: No results found for: DDIMER   Folate and B12: No results found for: FRGIWOIW15, No results found for: FOLATE  Lactic Acid: No results found for: LACTA  Thyroid Studies: No results found for: TSH, Coleen Ant    Oupatient labs:  Lab Results   Component Value Date    CHOL 184 10/05/2010    TRIG 117 10/05/2010    HDL 52 12/15/2017    LDLCALC 118 (H) 12/15/2017    PSA 3.42 12/15/2017       Urinalysis:    Lab Results   Component Value Date    NITRU Negative 12/21/2020    BLOODU Negative 12/21/2020    SPECGRAV 1.010 12/21/2020    GLUCOSEU Negative 12/21/2020       Imaging:  Xr Chest Portable    Result Date: 12/22/2020  EXAMINATION: ONE XRAY VIEW OF THE CHEST 12/22/2020 10:12 am COMPARISON: 02/14/2019 HISTORY: ORDERING SYSTEM PROVIDED HISTORY: chest pain TECHNOLOGIST PROVIDED HISTORY: Reason for exam:->chest pain Reason for exam:->preop What reading provider will be dictating this exam?->CRC FINDINGS: The lungs are without acute focal process. There is no effusion or pneumothorax. The cardiomediastinal silhouette is without acute process. The osseous structures are without acute process. No acute process. ASSESSMENT:    CAD native artery  Exertional dyspnea  History of COPD  History of asthma  CAD  Nonrheumatic mitral valve insufficiency  Tricuspid valve insufficiency      PLAN:    Admit for observation  Check lipid panel A1c  IV fluid hydration, morning BMP  Await recommendations from cardiology prior to discharge. Continue bronchodilators  Statin,  antiplatelet  Inpatient consulted cardiac rehab    Diet: DIET CARDIAC;  Code Status: Full Code  Surrogate decision maker confirmed with patient:   Extended Emergency Contact Information  Primary Emergency Contact: none, per pt  Home Phone: [phone]  Relation: Unknown      DVT Prophylaxis: [x]Lovenox []Heparin []PCD [] [address] []Encouraged ambulation  Disposition: []Med/Surg [x] Intermediate [] ICU/CCU  Admit status: [x] Observation [] Inpatient     +++++++++++++++++++++++++++++++++++++++++++++++++  [employer], [place]  +++++++++++++++++++++++++++++++++++++++++++++++++  NOTE: This report was transcribed using voice recognition software. Every effort was made to ensure accuracy; however, inadvertent computerized transcription errors may be present.

## 2020-12-22 NOTE — PROCEDURES
[address]                  [address] [place],  [facility]                            CARDIAC CATHETERIZATION    PATIENT NAME: Marleny Valerio                    :        1955  MED REC NO:   [id number]                            ROOM:  ACCOUNT NO:   [de-identified]                           ADMIT DATE: 2020  PROVIDER:     Debo Thayer MD    DATE OF PROCEDURE:  2020    CARDIAC CATHETERIZATION AND ANGIOPLASTY REPORT    PROCEDURES:  Left heart catheterization, selective coronary angiography,  left ventriculography, balloon angioplasty with deployment of a  drug-eluting coronary stent to the mid to distal LAD and balloon  angioplasty with the deployment of a drug-eluting coronary stent to the  mid to distal left circumflex. The procedure was done through right radial approach using ultrasound  guidance. The patient received intravenous Versed and intravenous fentanyl for  sedation. INDICATION:  Worsening exertional dyspnea. Abnormal stress test.    PRESSURES:  1. Aorta 93/52 with a mean of 68.  2.  Left ventricular systolic pressure 346. Left ventricular  end-diastolic pressure 9.  3.  There was no significant gradient across the aortic valve. CORONARY ANGIOGRAPHY:  LEFT MAIN:  The left main artery did not appear to have any significant  angiographic disease. LAD:  The left anterior descending artery is a large vessel that wraps  around the left ventricular apex. It has an eccentric 20% proximal  vessel narrowing. The LAD is excessively tortuous in its middle  segment. There is around 50% focal luminal narrowing in the mid LAD  before the large terminal diagonal branch. Just after the origin of the  large terminal diagonal branch, the LAD has around 80% to 90% luminal  narrowing. The distal LAD did not appear to have any significant  disease.     LCX:  The left circumflex takes off at an acute more than 90-degree 10 atmospheres. This was followed by advancing a 2.25 mm x 18 mm [place]  Resolute drug-eluting coronary stent which was deployed at the site at  14 atmospheres. Contrast injection after the deployment of the stent  revealed very good results without any residual stenosis with no  evidence of dissections or flaps and with BANDAR-3 flow in the vessel. Next, intervention on the left circumflex was performed. Attempt to  pass the extra support wire through the acute angulation at the takeoff  of the circumflex from the left main artery was difficult. An extra  support exchange wire was placed in the LAD for guide catheter  anchoring. A floppy wire was then used which was successfully advanced  through the acute angulation at the origin of the left circumflex and  further distally into the left circumflex through the site of occlusion  that was intended to intervene upon. The 2.0 mm x 15 mm balloon was  then advanced over the floppy wire and the floppy wire was exchanged to  an extra support exchange wire. The site of stenosis in the mid to  distal left circumflex was dilated with the 2.0 mm x 15 mm balloon which  was inflated at the site to 14 atmospheres. This was followed by  advancing a 2.5 mm x 18 mm [place] Resolute drug-eluting coronary stent  which was deployed at the site at 16 atmospheres. Contrast injection again revealed excellent results at the site without  any residual stenosis with no evidence of dissections or flaps and with  BANDAR-3 flow in the vessel. The right radial arterial sheath was removed at the end of the procedure  and a TR band was applied with adequate hemostasis and with preservation  of pulse. The patient tolerated the procedure well and left the cardiac  catheterization laboratory in a stable condition. CONCLUSIONS:  1. Coronary artery disease:  a. Left main:  No significant disease.   b.  LAD:  20% eccentric proximal vessel narrowing, 50% mid vessel disease and 80% to 90% mid to distal vessel stenosis after the large  terminal diagonal branch.  c.  LCX:  70% ostial stenosis of a small to moderate first marginal  branch. No significant disease of the large second marginal branch. 70% stenosis of the mid to distal left circumflex. d.  RCA:  Dominant vessel with around 50% to 60% ostial narrowing of the  small posterolateral branch and around 30% ostial narrowing of the large  posterior descending artery branch. 2.  Normal left ventricular size and systolic function with an estimated  ejection fraction of 65%. 3.  Successful balloon angioplasty with the deployment of drug-eluting  coronary stent to the mid to distal LAD with very good results. 4.  Successful balloon angioplasty with the deployment of drug-eluting  coronary stent to the mid to distal left circumflex with very good  results.         Eddie Anand MD    D: 12/22/2020 13:50:24       T: 12/22/2020 13:56:22     [email or web address]  Job#: [id number]     Doc#: [id number]    CC:

## 2020-12-23 VITALS
DIASTOLIC BLOOD PRESSURE: 70 MMHG | OXYGEN SATURATION: 97 % | HEART RATE: 69 BPM | RESPIRATION RATE: 20 BRPM | TEMPERATURE: 97.4 F | WEIGHT: 196 LBS | SYSTOLIC BLOOD PRESSURE: 119 MMHG | BODY MASS INDEX: 28.06 KG/M2 | HEIGHT: 70 IN

## 2020-12-23 LAB
ANION GAP SERPL CALCULATED.3IONS-SCNC: 10 MMOL/L (ref 7–16)
BUN BLDV-MCNC: 15 MG/DL (ref 8–23)
CALCIUM SERPL-MCNC: 8.1 MG/DL (ref 8.6–10.2)
CHLORIDE BLD-SCNC: 110 MMOL/L (ref 98–107)
CHOLESTEROL, TOTAL: 141 MG/DL (ref 0–199)
CO2: 20 MMOL/L (ref 22–29)
CREAT SERPL-MCNC: 0.8 MG/DL (ref 0.7–1.2)
GFR AFRICAN AMERICAN: >60
GFR NON-AFRICAN AMERICAN: >60 ML/MIN/1.73
GLUCOSE BLD-MCNC: 87 MG/DL (ref 74–99)
HBA1C MFR BLD: 5.4 % (ref 4–5.6)
HDLC SERPL-MCNC: 40 MG/DL
LDL CHOLESTEROL CALCULATED: 89 MG/DL (ref 0–99)
MAGNESIUM: 2 MG/DL (ref 1.6–2.6)
POTASSIUM REFLEX MAGNESIUM: 3.4 MMOL/L (ref 3.5–5)
SODIUM BLD-SCNC: 140 MMOL/L (ref 132–146)
TRIGL SERPL-MCNC: 61 MG/DL (ref 0–149)
VLDLC SERPL CALC-MCNC: 12 MG/DL

## 2020-12-23 PROCEDURE — 36415 COLL VENOUS BLD VENIPUNCTURE: CPT

## 2020-12-23 PROCEDURE — 83735 ASSAY OF MAGNESIUM: CPT

## 2020-12-23 PROCEDURE — 93005 ELECTROCARDIOGRAM TRACING: CPT | Performed by: INTERNAL MEDICINE

## 2020-12-23 PROCEDURE — 6360000002 HC RX W HCPCS: Performed by: FAMILY MEDICINE

## 2020-12-23 PROCEDURE — 80048 BASIC METABOLIC PNL TOTAL CA: CPT

## 2020-12-23 PROCEDURE — 83036 HEMOGLOBIN GLYCOSYLATED A1C: CPT

## 2020-12-23 PROCEDURE — G0378 HOSPITAL OBSERVATION PER HR: HCPCS

## 2020-12-23 PROCEDURE — 94640 AIRWAY INHALATION TREATMENT: CPT

## 2020-12-23 PROCEDURE — 99214 OFFICE O/P EST MOD 30 MIN: CPT | Performed by: INTERNAL MEDICINE

## 2020-12-23 PROCEDURE — 2580000003 HC RX 258: Performed by: INTERNAL MEDICINE

## 2020-12-23 PROCEDURE — 80061 LIPID PANEL: CPT

## 2020-12-23 PROCEDURE — 6360000002 HC RX W HCPCS: Performed by: INTERNAL MEDICINE

## 2020-12-23 PROCEDURE — 6370000000 HC RX 637 (ALT 250 FOR IP): Performed by: FAMILY MEDICINE

## 2020-12-23 PROCEDURE — 2580000003 HC RX 258: Performed by: FAMILY MEDICINE

## 2020-12-23 PROCEDURE — 96372 THER/PROPH/DIAG INJ SC/IM: CPT

## 2020-12-23 PROCEDURE — 6370000000 HC RX 637 (ALT 250 FOR IP): Performed by: INTERNAL MEDICINE

## 2020-12-23 PROCEDURE — 94664 DEMO&/EVAL PT USE INHALER: CPT

## 2020-12-23 RX ORDER — ATORVASTATIN CALCIUM 20 MG/1
20 TABLET, FILM COATED ORAL NIGHTLY
Qty: 30 TABLET | Refills: 3 | Status: SHIPPED | OUTPATIENT
Start: 2020-12-23 | End: 2021-03-31 | Stop reason: ALTCHOICE

## 2020-12-23 RX ORDER — ASPIRIN 81 MG/1
81 TABLET, CHEWABLE ORAL DAILY
Qty: 30 TABLET | Refills: 3 | Status: SHIPPED | OUTPATIENT
Start: 2020-12-24 | End: 2021-04-23 | Stop reason: SDUPTHER

## 2020-12-23 RX ADMIN — POTASSIUM BICARBONATE 40 MEQ: 782 TABLET, EFFERVESCENT ORAL at 12:47

## 2020-12-23 RX ADMIN — Medication 1 TABLET: at 09:31

## 2020-12-23 RX ADMIN — ENOXAPARIN SODIUM 40 MG: 40 INJECTION SUBCUTANEOUS at 09:31

## 2020-12-23 RX ADMIN — ASPIRIN 81 MG CHEWABLE TABLET 81 MG: 81 TABLET CHEWABLE at 09:31

## 2020-12-23 RX ADMIN — TICAGRELOR 90 MG: 90 TABLET ORAL at 09:31

## 2020-12-23 RX ADMIN — PANTOPRAZOLE SODIUM 40 MG: 40 TABLET, DELAYED RELEASE ORAL at 09:32

## 2020-12-23 RX ADMIN — SODIUM CHLORIDE, PRESERVATIVE FREE 10 ML: 5 INJECTION INTRAVENOUS at 09:31

## 2020-12-23 RX ADMIN — BUDESONIDE 250 MCG: 0.25 SUSPENSION RESPIRATORY (INHALATION) at 08:57

## 2020-12-23 RX ADMIN — IPRATROPIUM BROMIDE 0.5 MG: 0.5 SOLUTION RESPIRATORY (INHALATION) at 08:57

## 2020-12-23 RX ADMIN — ARFORMOTEROL TARTRATE 15 MCG: 15 SOLUTION RESPIRATORY (INHALATION) at 08:57

## 2020-12-23 RX ADMIN — SODIUM CHLORIDE, PRESERVATIVE FREE 10 ML: 5 INJECTION INTRAVENOUS at 09:30

## 2020-12-23 ASSESSMENT — PAIN SCALES - GENERAL
PAINLEVEL_OUTOF10: 0

## 2020-12-23 NOTE — PROGRESS NOTES
Patient Education:  Post PCI, Risk Factors, Recovery, Prevention for Future, Lifestyle Changes     Met with patient to review information relating to current diagnosis. Discussed the following topics:     1. CAD & Coronary Stents- A&P, cardiac cath, equipment used, heart anatomy  2. HTN- what is blood pressure, normals, how to manage BP/lifestyle changes  3. HLD-cholesterol, types, where it comes from, nutrition counseling  4. Diabetes Education- NA  5. Smoking cessation- NA  6. Active lifestyle suggestions- why activity and exercise are recommended, suggestions to start, Cardiac Rehabilitation benefits  7. Healthy eating- tips to help with Blood pressure and cholesterol management, eating regular meals, alternative food choices. 8. Stress management techniques  9. Post hospital follow up visit with PCP and cardiology  10. Reviewed medications- aspirin, P2Y12, beta blockers, statins- what they are indicated for and importance of compliance. 7day AM/PM medication planner given to patient. Health journal provided to record BP and health information. Patient information card given to patient to keep in wallet or pocket for record keeping on medical history, doctor names and numbers, medication list.      Handouts given in packet, information discussed. Questions answered. Patient verbalized understanding. Office number left with patient and encouraged to call with questions he may have about information reviewed. Time spent with patent 20 minutes.

## 2020-12-23 NOTE — PLAN OF CARE
Problem: Falls - Risk of:  Goal: Will remain free from falls  Description: Will remain free from falls  12/23/2020 0702 by Marina Cole RN  Outcome: Met This Shift  12/22/2020 2244 by Janey Loera RN  Outcome: Met This Shift  Goal: Absence of physical injury  Description: Absence of physical injury  Outcome: Met This Shift     Problem: Cardiac:  Goal: Ability to maintain an adequate cardiac output will improve  Description: Ability to maintain an adequate cardiac output will improve  12/22/2020 2244 by Janey Loera RN  Outcome: Met This Shift

## 2020-12-23 NOTE — PROGRESS NOTES
Inpatient Cardiology Progress note     PATIENT IS BEING FOLLOWED FOR: KENNETH Shaver is a 72 y.o. male Known to Dr. Malone Primer: Denies CP or SOB  OBJECTIVE: No apparent distress     ROS:  Consist: Denies fevers, chills or night sweats  Heart: Denies chest pain, palpitations, lightheadedness, dizziness or syncope  Lungs: Denies SOB, cough, wheezing, orthopnea or PND  GI: Denies abdominal pain, vomiting or diarrhea    PHYSICAL EXAM:   /70   Pulse 69   Temp 97.4 °F (36.3 °C) (Temporal)   Resp 20   Ht 5' 10\" (1.778 m)   Wt 196 lb (88.9 kg)   SpO2 97%   BMI 28.12 kg/m²    B/P Range last 24 hours: Systolic (28CEI), ISD:700 , Min:109 , XME:167    Diastolic (76QYY), EPY:67, Min:67, Max:80    CONST: Well developed, well nourished who appears stated age. Awake, alert and cooperative. No apparent distress  HEENT:   Head- Normocephalic, atraumatic   Eyes- Conjunctivae pink, anicteric  Throat- Oral mucosa pink and moist  Neck-  No stridor, trachea midline, no jugular venous distention. No carotid bruit  CHEST: Chest symmetrical and non-tender to palpation. No accessory muscle use or intercostal retractions  RESPIRATORY:  Lung sounds - clear throughout fields   CARDIOVASCULAR:     Heart Inspection- shows no noted pulsations  Heart Palpation- no heaves or thrills; PMI is non-displaced   Heart Ausculation- Regular rate and rhythm, no murmur. No s3, s4 or rub   PV: No lower extremity edema. No varicosities. Pedal pulses palpable, no clubbing or cyanosis. Right radial access site without hematoma and with preserved pulse   ABDOMEN: Soft, non-tender to light palpation. Bowel sounds present. No palpable masses no organomegaly; no abdominal bruit  MS: Good muscle strength and tone. No atrophy or abnormal movements. : Deferred  SKIN: Warm and dry no statis dermatitis or ulcers   NEURO / PSYCH: Oriented to person, place and time. Speech clear and appropriate. Follows all commands.  Pleasant affect Intake/Output Summary (Last 24 hours) at 12/23/2020 1255  Last data filed at 12/23/2020 0800  Gross per 24 hour   Intake 1356.61 ml   Output 450 ml   Net 906.61 ml       Weight:   Wt Readings from Last 3 Encounters:   12/22/20 196 lb (88.9 kg)   12/17/20 196 lb (88.9 kg)   12/16/20 196 lb (88.9 kg)     Current Inpatient Medications:   therapeutic multivitamin-minerals  1 tablet Oral BID    pantoprazole  40 mg Oral Daily    sodium chloride flush  10 mL Intravenous 2 times per day    atorvastatin  20 mg Oral Nightly    aspirin  81 mg Oral Daily    ticagrelor  90 mg Oral BID    sodium chloride flush  10 mL Intravenous 2 times per day    enoxaparin  40 mg Subcutaneous Daily    budesonide  0.25 mg Nebulization BID    Arformoterol Tartrate  15 mcg Nebulization BID    ipratropium  0.5 mg Nebulization 4x daily       IV Infusions (if any):   sodium chloride 100 mL/hr at 12/22/20 1941       DIAGNOSTIC/ LABORATORY DATA:  Labs:   CBC:   Recent Labs     12/21/20  0814   WBC 7.4   HGB 13.0   HCT 41.4        BMP:   Recent Labs     12/21/20  0814 12/23/20  0501    140   K 4.5 3.4*   CO2 29 20*   BUN 14 15   CREATININE 0.9 0.8   LABGLOM >60 >60   CALCIUM 9.4 8.1*     Mag:   Recent Labs     12/23/20  0501   MG 2.0     HgA1c:   Lab Results   Component Value Date    LABA1C 5.4 12/23/2020     FASTING LIPID PANEL:  Lab Results   Component Value Date    CHOL 141 12/23/2020    HDL 40 12/23/2020    LDLCALC 89 12/23/2020    TRIG 61 12/23/2020       Telemetry: SR    12 lead EKG 12/22/2020:     Cath/PCI 12/22/2020 ( Dr. Mariann Dunne ):  CONCLUSIONS:  1. Coronary artery disease:  a. Left main:  No significant disease. b.  LAD:  20% eccentric proximal vessel narrowing, 50% mid vessel  disease and 80% to 90% mid to distal vessel stenosis after the large  terminal diagonal branch.  c.  LCX:  70% ostial stenosis of a small to moderate first marginal  branch. No significant disease of the large second marginal branch. 70% stenosis of the mid to distal left circumflex. d.  RCA:  Dominant vessel with around 50% to 60% ostial narrowing of the  small posterolateral branch and around 30% ostial narrowing of the large  posterior descending artery branch. 2.  Normal left ventricular size and systolic function with an estimated  ejection fraction of 65%. 3.  Successful balloon angioplasty with the deployment of drug-eluting  coronary stent to the mid to distal LAD with very good results. 4.  Successful balloon angioplasty with the deployment of drug-eluting  coronary stent to the mid to distal left circumflex with very good  results.       ASSESSMENT:   1. CAD s/p PCI to LAD and LCX 12/22/2020. No CP  2. COPD / Asthma   3. Hypokalemia, received K+ supplementation       PLAN:  1. Continue current cardiac medications. Importance of compliance with DAPT emphasized  2. Rest of home medications same  2. OK for discharge from cardiology stand point.  Out patient cardiology FU with his primary cardiologist, Dr. Kylie Resendiz, made    Electronically signed by Resa Bumpers, MD on 12/23/2020 at 12:55 PM

## 2020-12-23 NOTE — DISCHARGE SUMMARY
person, place, and time. Motor and sensation grossly intact. Skin: Skin is warm and dry. No rashes, lesions. Psychiatric: has a normal mood and affect. Behavior is normal.       Consults:   IP CONSULT TO CARDIAC REHAB    Discharge Diagnoses:    CAD native artery  Exertional dyspnea  History of COPD  History of asthma  CAD  Nonrheumatic mitral valve insufficiency  Tricuspid valve insufficiency    Discharge Instructions / Follow up:    Future Appointments   Date Time Provider Negra Katie   1/5/2021 10:50 AM DO Jairo Hunter [facility]       Continued appropriate risk factor modification of blood pressure, diabetes and serum lipids will remain essential to reducing risk of future atherosclerotic development    Activity: activity as tolerated    Significant labs:  CBC:   Recent Labs     12/21/20  0814   WBC 7.4   RBC 4.92   HGB 13.0   HCT 41.4   MCV 84.1   RDW 14.1        BMP:   Recent Labs     12/21/20  0814 12/23/20  0501    140   K 4.5 3.4*    110*   CO2 29 20*   BUN 14 15   CREATININE 0.9 0.8   MG  --  2.0     LFT:  No results for input(s): PROT, ALB, ALKPHOS, ALT, AST, BILITOT, AMYLASE, LIPASE in the last 72 hours. PT/INR: No results for input(s): INR, APTT in the last 72 hours. BNP: No results for input(s): BNP in the last 72 hours.   Hgb A1C:   Lab Results   Component Value Date    LABA1C 5.4 12/23/2020     Folate and B12: No results found for: Tahira White, No results found for: FOLATE  Thyroid Studies: No results found for: TSH, C4PEYPW, G7DAWEY, THYROIDAB    Urinalysis:    Lab Results   Component Value Date    NITRU Negative 12/21/2020    BLOODU Negative 12/21/2020    SPECGRAV 1.010 12/21/2020    GLUCOSEU Negative 12/21/2020       Imaging:  Xr Chest Portable    Result Date: 12/22/2020  EXAMINATION: ONE XRAY VIEW OF THE CHEST 12/22/2020 10:12 am COMPARISON: 02/14/2019 HISTORY: ORDERING SYSTEM PROVIDED HISTORY: chest pain TECHNOLOGIST PROVIDED HISTORY: Reason for exam:->chest pain Reason for exam:->preop What reading provider will be dictating this exam?->CRC FINDINGS: The lungs are without acute focal process. There is no effusion or pneumothorax. The cardiomediastinal silhouette is without acute process. The osseous structures are without acute process. No acute process. Discharge Medications:      Medication List      START taking these medications    aspirin 81 MG chewable tablet  Take 1 tablet by mouth daily  Start taking on: December 24, 2020     atorvastatin 20 MG tablet  Commonly known as: LIPITOR  Take 1 tablet by mouth nightly     ticagrelor 90 MG Tabs tablet  Commonly known as: BRILINTA  Take 1 tablet by mouth 2 times daily        CONTINUE taking these medications    albuterol (2.5 MG/3ML) 0.083% nebulizer solution  Commonly known as: PROVENTIL     Arginine 500 MG Caps     Fish Oil 1200 MG Caps     omeprazole 20 MG delayed release capsule  Commonly known as: PRILOSEC     therapeutic multivitamin-minerals tablet     Trelegy Ellipta 100-62.5-25 MCG/INH Aepb  Generic drug: fluticasone-umeclidin-vilant        STOP taking these medications    ibuprofen 800 MG tablet  Commonly known as: ADVIL;MOTRIN           Where to Get Your Medications      You can get these medications from any pharmacy    Bring a paper prescription for each of these medications  · aspirin 81 MG chewable tablet  · atorvastatin 20 MG tablet  · ticagrelor 90 MG Tabs tablet         Time Spent on discharge is more than 45 minutes in the examination, evaluation, counseling and review of medications and discharge plan.    +++++++++++++++++++++++++++++++++++++++++++++++++  Yokasta Stout MD  [address]  +++++++++++++++++++++++++++++++++++++++++++++++++  NOTE: This report was transcribed using voice recognition software.  Every effort was made to ensure accuracy; however, inadvertent computerized transcription errors may be present.

## 2020-12-23 NOTE — CONSULTS
Met with patient and discussed that their physician has ordered a referral to our outpatient Phase II Cardiac Rehabilitation program. Reviewed the benefits of cardiac rehabilitation based on their diagnosis and personal risk factors. Patient demonstrates strong interest in Cardiac Rehabilitation at this time. Cardiac Rehabilitation brochure provided to patient/family. The Cardiac Rehabilitation Program has been provided the patient's referral information and pertinent patient details and history. The patient may call [facility] at [phone] for additional information or questions. Contact information for [facility] and other choices close to the patient's residence have been provided in the discharge instructions so that the patient may call and schedule an appointment when cleared by their physician.  Thank you for the referral.

## 2020-12-24 LAB
EKG ATRIAL RATE: 58 BPM
EKG ATRIAL RATE: 62 BPM
EKG P AXIS: 47 DEGREES
EKG P AXIS: 71 DEGREES
EKG P-R INTERVAL: 172 MS
EKG P-R INTERVAL: 186 MS
EKG Q-T INTERVAL: 470 MS
EKG Q-T INTERVAL: 482 MS
EKG QRS DURATION: 138 MS
EKG QRS DURATION: 140 MS
EKG QTC CALCULATION (BAZETT): 473 MS
EKG QTC CALCULATION (BAZETT): 477 MS
EKG R AXIS: 41 DEGREES
EKG R AXIS: 56 DEGREES
EKG T AXIS: 21 DEGREES
EKG T AXIS: 26 DEGREES
EKG VENTRICULAR RATE: 58 BPM
EKG VENTRICULAR RATE: 62 BPM
POC ACT LR: 285 SECONDS
POC ACT LR: 354 SECONDS

## 2020-12-28 ENCOUNTER — TELEPHONE (OUTPATIENT)
Dept: CARDIOLOGY CLINIC | Age: 65
End: 2020-12-28

## 2020-12-28 NOTE — TELEPHONE ENCOUNTER
Patient had a stent placement on December 22nd - he is asking if he's able to go back to work.   He works as a  no real lifting to his job

## 2021-01-05 ENCOUNTER — OFFICE VISIT (OUTPATIENT)
Dept: ORTHOPEDIC SURGERY | Age: 66
End: 2021-01-05
Payer: COMMERCIAL

## 2021-01-05 VITALS — TEMPERATURE: 98 F | HEIGHT: 70 IN | BODY MASS INDEX: 28.06 KG/M2 | WEIGHT: 196 LBS

## 2021-01-05 DIAGNOSIS — M17.11 PRIMARY OSTEOARTHRITIS OF RIGHT KNEE: Primary | ICD-10-CM

## 2021-01-05 PROCEDURE — 20610 DRAIN/INJ JOINT/BURSA W/O US: CPT | Performed by: ORTHOPAEDIC SURGERY

## 2021-01-05 PROCEDURE — 99213 OFFICE O/P EST LOW 20 MIN: CPT | Performed by: ORTHOPAEDIC SURGERY

## 2021-01-05 RX ORDER — TRIAMCINOLONE ACETONIDE 40 MG/ML
40 INJECTION, SUSPENSION INTRA-ARTICULAR; INTRAMUSCULAR ONCE
Status: COMPLETED | OUTPATIENT
Start: 2021-01-05 | End: 2021-01-05

## 2021-01-05 RX ADMIN — TRIAMCINOLONE ACETONIDE 40 MG: 40 INJECTION, SUSPENSION INTRA-ARTICULAR; INTRAMUSCULAR at 11:57

## 2021-01-05 NOTE — PROGRESS NOTES
Musculoskeletal: (-) fractures,  (-) dislocations,(-) collagen vascular disease, (-) fibromyalgia, (-) multiple sclerosis, (-) muscular dystrophy, (-) RSD,(-) joint pain (-)swelling, (-) joint pain,swelling. Neurologic: (-) epilepsy, (-)seizures,(-) brain tumor,(-) TIA, (-)stroke, (-)headaches, (-)Parkinson disease,(-) memory loss, (-) LOC. Cardiovascular: (-) Chest pain, (-) swelling in legs/feet, (-) SOB, (-) cramping in legs/feet with walking. Respiratory: (-) SOB, (-) Coughing, (-) night sweats. GI: (-) nausea, (-) vomiting, (-) diarrhea, (-) blood in stool, (-) gastric ulcer. Psychiatric: (-) Depression, (-) Anxiety, (-) bipolar disease, (-) Alzheimer's Disease  Allergic/Immunologic: (-) allergies latex, (-) allergies metal, (-) skin sensitivity. Hematlogic: (-) anemia, (-) blood transfusion, (-) DVT/PE, (-) Clotting disorders    Subjective:    Constitution:  Temp 98 °F (36.7 °C)   Ht 5' 10\" (1.778 m)   Wt 196 lb (88.9 kg)   BMI 28.12 kg/m²     Psycihatric:  The patient is alert and oriented x 3, appears to be stated age and in no distress. Respiratory:  Respiratory effort is not labored. Patient is not gasping. Palpation of the chest reveals no tactile fremitus. Skin:  Upon inspection: the skin appears warm, dry and intact. There is  a previous scar over the affected area. There is any cellulitis, lymphedema or cutaneous lesions noted in the lower extremities. Upon palpation there is no induration noted. Neurologic:  Gait: antalgic; Motor exam of the lower extremities show ; quadriceps, hamstrings, foot dorsi and plantar flexors intact R.  5/5 and L. 5/5. Deep tendon reflexes are 2/4 at the knees and 2/4 at the ankles with strong extensor hallicus longus motor strength bilaterally. Sensory to both feet is intact to all sensory root. Cardiovascular: The vascular exam is normal and is well perfused to distal extremities. Distal pulses DP/PT: R. 2+; L. 2+.   There is cap refill noted less than two seconds in all digits. There is not edema of the bilateral lower extremities. There is not varicosities noted in the distal extremities. Lymph:  Upon palpation,  there is no lymphadenopathy noted in bilateral lower extremities. Musculoskeletal:  Gait: antalgic; examination of the nails and digits reveal no cyanosis or clubbing. Lumbar exam:  On visual inspection, there is not deformity of the spine. full range of motion, no tenderness, palpable spasm or pain on motion. Special tests: Straight Leg Raise negative, Jason test negative. Hip exam:   Upon inspection, there is not deformity noted. Upon palpation there is not tenderness. ROM: is  full and symmetrical.   Strength: Hip Flexors 5/5; Hip Abductors 5/5; Hip Adduction 5/5. Knee exam:  Right knee exam shows;  range of motion of R. Knee is 0 to 120, and L. Knee is 0 to 120. The patient does have  pain on motion, effusion is mild, there is tenderness over the  medial region, there are not any masses, there is not ligamentous instability, there is not  deformity noted. Knee exam: right positive for moderate crepitations, some mild tenderness laxity is not noted with  stress. There is not a popliteal cyst.    R. Knee:  Lachman's negative, Anterior Drawer negative, Posterior Drawer negative  Nargis's negative, Thallasy  negative,   PF grind test negative, Apprehension test negative, Patellar J sign  negative  L. Knee:  Lachman's negative, Anterior Drawer negative, Posterior Drawer negative  Nargis's negative, Thallasy  negative,   PF grind test negative, Apprehension test negative,  Patellar J sign  negative    Xray Exam:     FINDINGS: Mild tricompartmental degenerative changes are noted,   greatest in the medial compartment. There is no joint effusion. There   is normal alignment. The patella is well located.  No fractures are   identified.        Radiographic findings reviewed with patient    Assessment:  Encounter Diagnoses   Name Primary?  Primary osteoarthritis of right knee Yes       Plan:  Natural history and expected course discussed. Questions answered. Educational materials distributed. Rest, ice, compression, and elevation (RICE) therapy. Reduction in offending activity. Patellar compression sleeve. I had a lengthy discussion with the patient regarding their diagnosis. I explained treatment options including surgical vs non surgical treatment. I reviewed in detail the risks and benefits and outlined the procedure in detail with expected outcomes and possible complications. I also discussed non surgical treatment such as injections (CSI and visco supplementation), physical therapy, topical creams and NSAID's. They have elected for conservative management at this time. I will proceed with a cortisone injection in the Right knee. Verbal and written consent was obtained for the injections. The skin was prepped with alcohol. 1mL of Kenalog 40mg and 9mL of 0.25% Marcaine was  injected to Right knee. The injection was given through the lateral side of the knee. The patient tolerated the injection well. I will see the patient back prn   The patient has failed conservative measures such as NSAIDS, HEP, and cortisone injections. He is an excellent candidate for viscous supplementation injections including euflexxa in the Right knee.    We will contact the patient's insurance company and see them back in the office once we have received approval.

## 2021-01-10 NOTE — PROGRESS NOTES
[facility] Cardiology Progress Note  Dr. Monique Ragland      Referring Physician: Kenny Salas MD  CHIEF COMPLAINT:   Chief Complaint   Patient presents with    Coronary Artery Disease     Post Stent. Patient denies any cp sob or dizziness. Patient states his memory is going. He is very forgetful. HISTORY OF PRESENT ILLNESS:   Patient is 72year old male CAD s/p PCI to LAD in December 2020, is here for follow-up appointment. Occasional sharp chest pain that comes and goes, shortness of breath is significantly better, denies any palpitations, no pedal edema, no PND, no orthopnea, no syncope, no presyncopal episodes       Past Medical History:   Diagnosis Date    Arthritis     Asthma     Breathing difficulty     CAD (coronary artery disease)     COPD (chronic obstructive pulmonary disease) ([facility] [address]     Non-rheumatic tricuspid valve insufficiency     Nonrheumatic mitral (valve) insufficiency          Past Surgical History:   Procedure Laterality Date    APPENDECTOMY      COLONOSCOPY      CORONARY ANGIOPLASTY WITH STENT PLACEMENT  12/22/2020    DR. Mandel Resolute [place] DUANE 2.25x18 Mid LAD, Resolute [place] DUANE 2.5x18 Mid Cx.   EF 65%    HERNIA REPAIR      OTHER SURGICAL HISTORY      right wrist carpal tunnel release, right long trigger finger release    OTHER SURGICAL HISTORY Right 01/20/2016    right shoulder arthroscopy acromioplasty with decompression rotator cuff repair     SHOULDER ARTHROSCOPY  3/4/11    left rotator cuff repair         Current Outpatient Medications   Medication Sig Dispense Refill    aspirin 81 MG chewable tablet Take 1 tablet by mouth daily 30 tablet 3    atorvastatin (LIPITOR) 20 MG tablet Take 1 tablet by mouth nightly 30 tablet 3    ticagrelor (BRILINTA) 90 MG TABS tablet Take 1 tablet by mouth 2 times daily 60 tablet 3    TRELEGY ELLIPTA 100-62.5-25 MCG/INH AEPB inhale 1 puff by mouth and INTO THE LUNGS once daily      omeprazole (PRILOSEC) 20 MG delayed release capsule nontender, no hepatomegaly, no splenomegaly, bowel sound positive. Musculoskeletal:  No clubbing or cyanosis. No redness, warmth, or swelling of the joints. Neurological: Alert, awake, and oriented X3   SKIN: No bruises, no bleeding, normal skin color, texture, turgor and no redness, warmth or swelling. /64 (Site: Right Upper Arm, Position: Sitting, Cuff Size: Large Adult)   Pulse 61   Ht 5' 10\" (1.778 m)   Wt 194 lb (88 kg)   BMI 27.84 kg/m²     DATA:   I personally reviewed the visit EKG with the following interpretation: Sinus rhythm, left bundle branch block, no changes when compared to previous    EKG - 12/23/20 Normal sinus rhythm  Right bundle branch block  No previous ECGs available    ECHO: 3/12/2019) Normal let ventricular systolic function with normal diastolic function. Mild mitral valve regurgitation. Mild tricuspid valve regurgitation with normal RVSP. Stress Test: 12/17/20 Impression:     1. Exercise EKG was abnormal with Downsloping ST depression in   the inferior leads, horizontal ST depression in the anterolateral   leads, with high predictive value for ischemia.     2. The patient experienced Shortness of breath with some   tightness in the chest with exercise. 3. Gusman treadmill score was -9 implying intermediate risk of   acute ischemic events.     4. Exercise capacity was below average. Angiography: 12/22/20 CONCLUSIONS:  1. Coronary artery disease:  a. Left main:  No significant disease. b.  LAD:  20% eccentric proximal vessel narrowing, 50% mid vessel  disease and 80% to 90% mid to distal vessel stenosis after the large  terminal diagonal branch.  c.  LCX:  70% ostial stenosis of a small to moderate first marginal  branch. No significant disease of the large second marginal branch. 70% stenosis of the mid to distal left circumflex.   d.  RCA:  Dominant vessel with around 50% to 60% ostial narrowing of the  small posterolateral branch and around 30% ostial narrowing of the large  posterior descending artery branch. 2.  Normal left ventricular size and systolic function with an estimated  ejection fraction of 65%. 3.  Successful balloon angioplasty with the deployment of drug-eluting  coronary stent to the mid to distal LAD with very good results. 4.  Successful balloon angioplasty with the deployment of drug-eluting  coronary stent to the mid to distal left circumflex with very good  results.       Cardiology Labs: BMP:    Lab Results   Component Value Date     12/23/2020    K 3.4 12/23/2020     12/23/2020    CO2 20 12/23/2020    BUN 15 12/23/2020    CREATININE 0.8 12/23/2020     CMP:    Lab Results   Component Value Date     12/23/2020    K 3.4 12/23/2020     12/23/2020    CO2 20 12/23/2020    BUN 15 12/23/2020    CREATININE 0.8 12/23/2020    PROT 6.8 12/15/2017     CBC:    Lab Results   Component Value Date    WBC 7.4 12/21/2020    RBC 4.92 12/21/2020    HGB 13.0 12/21/2020    HCT 41.4 12/21/2020    MCV 84.1 12/21/2020    RDW 14.1 12/21/2020     12/21/2020     PT/INR:  No results found for: PTINR  PT/INR Warfarin:  No components found for: PTPATWAR, PTINRWAR  PTT:  No results found for: APTT  PTT Heparin:  No components found for: APTTHEP  Magnesium:    Lab Results   Component Value Date    MG 2.0 12/23/2020     TSH:  No results found for: TSH  TROPONIN:  No components found for: TROP  BNP:  No results found for: BNP  FASTING LIPID PANEL:    Lab Results   Component Value Date    CHOL 141 12/23/2020    HDL 40 12/23/2020    TRIG 61 12/23/2020     No orders to display     I have personally reviewed the laboratory, cardiac diagnostic and radiographic testing as outlined above:      IMPRESSION:  1: CAD: Patient had cardiac catheterization on 12/22/2020 with the following findings:  # Left main:  No significant disease.   # LAD:  20% eccentric proximal vessel narrowing, 50% mid vessel disease and 80% to 90% mid to distal vessel stenosis after the large terminal diagonal branch. # LCX:  70% ostial stenosis of a small to moderate first marginal branch. No significant disease of the large second marginal branch. 70% stenosis of the mid to distal left circumflex. # RCA:  Dominant vessel with around 50% to 60% ostial narrowing of the small posterolateral branch and around 30% ostial narrowing of the large posterior descending artery branch. # Normal left ventricular size and systolic function with an estimated ejection fraction of 65%. # Successful balloon angioplasty with the deployment of drug-eluting coronary stent to the mid to distal LAD with very good results. # Successful balloon angioplasty with the deployment of drug-eluting coronary stent to the mid to distal left circumflex with very good results. Continue current treatment  2: Nonrheumatic mitral (valve) insufficiency: Mild             3:  Nonrheumatic tricuspid (valve) insufficiency: Mild            4: Chronic obstructive pulmonary disease, unspecified              5:  Family history of ischemic heart disease and other diseases: Father had his first heart attack at age 61               RECOMMENDATIONS:   1. Continue current treatment  2. Risk of instent thrombosis due to premature discontinuation of either ASA or Plavix discussed with patient at length  3. Preventive cardiology: Low-salt, low-cholesterol diet, daily exercise, total cholesterol of less than 200, LDL of less than 70, were all advised. 4.  Cardiac rehab  5. Follow-up with  as scheduled  6. Follow-up with Dr. Edel Mendoza in 3 months, sooner if symptomatic for any reason    I have reviewed my findings and recommendations with patient    Electronically signed by Adrianne Varma MD on 2/13/2021 at 11:00 AM    NOTE: This report was transcribed using voice recognition software.  Every effort was made to ensure accuracy; however, inadvertent computerized transcription errors may be presen

## 2021-01-14 ENCOUNTER — OFFICE VISIT (OUTPATIENT)
Dept: CARDIOLOGY CLINIC | Age: 66
End: 2021-01-14
Payer: COMMERCIAL

## 2021-01-14 VITALS
SYSTOLIC BLOOD PRESSURE: 136 MMHG | DIASTOLIC BLOOD PRESSURE: 64 MMHG | HEART RATE: 61 BPM | HEIGHT: 70 IN | BODY MASS INDEX: 27.77 KG/M2 | WEIGHT: 194 LBS

## 2021-01-14 DIAGNOSIS — R06.02 SHORTNESS OF BREATH: Primary | ICD-10-CM

## 2021-01-14 PROCEDURE — 99214 OFFICE O/P EST MOD 30 MIN: CPT | Performed by: INTERNAL MEDICINE

## 2021-01-14 PROCEDURE — 93000 ELECTROCARDIOGRAM COMPLETE: CPT | Performed by: INTERNAL MEDICINE

## 2021-01-21 ENCOUNTER — TELEPHONE (OUTPATIENT)
Dept: ORTHOPEDIC SURGERY | Age: 66
End: 2021-01-21

## 2021-01-21 NOTE — TELEPHONE ENCOUNTER
Health Maintenance Due   Topic Date Due   • Shingles Vaccine (2 of 3) 08/26/2014   • Pneumococcal Vaccine 65+ (2 of 2 - PPSV23) 05/30/2020   • Depression Screening  08/13/2020     Patient is due for topics as listed above but is not proceeding with Immunization(s) Pneumococcal and Shingles at this time.     Recent PHQ 2/9 Score    PHQ 2:  Date Adult PHQ 2 Score Adult PHQ 2 Interpretation   8/20/2020 0 No further screening needed       PHQ 9:        Tried to leave a message, but voicemial box was not set up.  I called in regards to Right Knee Synvisc is approved and to call the office to schedule an appointment with , or Hans Kidd

## 2021-01-26 ENCOUNTER — HOSPITAL ENCOUNTER (OUTPATIENT)
Age: 66
Discharge: HOME | End: 2021-01-28
Payer: COMMERCIAL

## 2021-01-26 ENCOUNTER — HOSPITAL ENCOUNTER (OUTPATIENT)
Dept: GENERAL RADIOLOGY | Age: 66
Discharge: HOME | End: 2021-01-28
Payer: COMMERCIAL

## 2021-01-26 DIAGNOSIS — J44.9 CHRONIC OBSTRUCTIVE PULMONARY DISEASE, UNSPECIFIED COPD TYPE (HCC): ICD-10-CM

## 2021-01-26 PROCEDURE — 71046 X-RAY EXAM CHEST 2 VIEWS: CPT

## 2021-01-29 LAB
ALBUMIN SERPL-MCNC: NORMAL G/DL
ALP BLD-CCNC: NORMAL U/L
ALT SERPL-CCNC: NORMAL U/L
ANION GAP SERPL CALCULATED.3IONS-SCNC: NORMAL MMOL/L
AST SERPL-CCNC: NORMAL U/L
BILIRUB SERPL-MCNC: NORMAL MG/DL
BUN BLDV-MCNC: NORMAL MG/DL
CALCIUM SERPL-MCNC: NORMAL MG/DL
CHLORIDE BLD-SCNC: NORMAL MMOL/L
CHOLESTEROL, TOTAL: NORMAL
CHOLESTEROL/HDL RATIO: NORMAL
CO2: NORMAL
CREAT SERPL-MCNC: NORMAL MG/DL
GFR CALCULATED: NORMAL
GLUCOSE BLD-MCNC: NORMAL MG/DL
HDLC SERPL-MCNC: NORMAL MG/DL
LDL CHOLESTEROL CALCULATED: NORMAL
NONHDLC SERPL-MCNC: NORMAL MG/DL
POTASSIUM SERPL-SCNC: NORMAL MMOL/L
SODIUM BLD-SCNC: NORMAL MMOL/L
TOTAL PROTEIN: NORMAL
TRIGL SERPL-MCNC: NORMAL MG/DL
VLDLC SERPL CALC-MCNC: NORMAL MG/DL

## 2021-02-03 ENCOUNTER — HOSPITAL ENCOUNTER (OUTPATIENT)
Dept: CARDIAC REHAB | Age: 66
Setting detail: THERAPIES SERIES
Discharge: HOME | End: 2021-02-03
Payer: COMMERCIAL

## 2021-02-03 VITALS
BODY MASS INDEX: 27.2 KG/M2 | OXYGEN SATURATION: 96 % | WEIGHT: 190 LBS | RESPIRATION RATE: 18 BRPM | HEART RATE: 64 BPM | DIASTOLIC BLOOD PRESSURE: 64 MMHG | SYSTOLIC BLOOD PRESSURE: 132 MMHG | HEIGHT: 70 IN

## 2021-02-03 ASSESSMENT — PATIENT HEALTH QUESTIONNAIRE - PHQ9: SUM OF ALL RESPONSES TO PHQ QUESTIONS 1-9: 5

## 2021-02-05 ENCOUNTER — HOSPITAL ENCOUNTER (OUTPATIENT)
Dept: CARDIAC REHAB | Age: 66
Setting detail: THERAPIES SERIES
Discharge: HOME | End: 2021-02-05
Payer: COMMERCIAL

## 2021-02-05 PROCEDURE — 93798 PHYS/QHP OP CAR RHAB W/ECG: CPT

## 2021-02-10 ENCOUNTER — HOSPITAL ENCOUNTER (OUTPATIENT)
Dept: CARDIAC REHAB | Age: 66
Setting detail: THERAPIES SERIES
End: 2021-02-10
Payer: COMMERCIAL

## 2021-02-12 ENCOUNTER — HOSPITAL ENCOUNTER (OUTPATIENT)
Dept: CARDIAC REHAB | Age: 66
Setting detail: THERAPIES SERIES
Discharge: HOME | End: 2021-02-12
Payer: COMMERCIAL

## 2021-02-12 PROCEDURE — 93798 PHYS/QHP OP CAR RHAB W/ECG: CPT

## 2021-02-15 ENCOUNTER — HOSPITAL ENCOUNTER (OUTPATIENT)
Dept: CARDIAC REHAB | Age: 66
Setting detail: THERAPIES SERIES
Discharge: HOME | End: 2021-02-15
Payer: COMMERCIAL

## 2021-02-15 PROCEDURE — 93798 PHYS/QHP OP CAR RHAB W/ECG: CPT

## 2021-02-17 ENCOUNTER — HOSPITAL ENCOUNTER (OUTPATIENT)
Dept: CARDIAC REHAB | Age: 66
Setting detail: THERAPIES SERIES
Discharge: HOME | End: 2021-02-17
Payer: COMMERCIAL

## 2021-02-17 PROCEDURE — 93798 PHYS/QHP OP CAR RHAB W/ECG: CPT

## 2021-02-19 ENCOUNTER — HOSPITAL ENCOUNTER (OUTPATIENT)
Dept: CARDIAC REHAB | Age: 66
Setting detail: THERAPIES SERIES
Discharge: HOME | End: 2021-02-19
Payer: COMMERCIAL

## 2021-02-19 PROCEDURE — 93798 PHYS/QHP OP CAR RHAB W/ECG: CPT

## 2021-02-22 ENCOUNTER — HOSPITAL ENCOUNTER (OUTPATIENT)
Dept: CARDIAC REHAB | Age: 66
Setting detail: THERAPIES SERIES
Discharge: HOME | End: 2021-02-22
Payer: COMMERCIAL

## 2021-02-22 PROCEDURE — 93798 PHYS/QHP OP CAR RHAB W/ECG: CPT

## 2021-02-24 ENCOUNTER — HOSPITAL ENCOUNTER (OUTPATIENT)
Dept: CARDIAC REHAB | Age: 66
Setting detail: THERAPIES SERIES
End: 2021-02-24
Payer: COMMERCIAL

## 2021-02-26 ENCOUNTER — HOSPITAL ENCOUNTER (OUTPATIENT)
Dept: CARDIAC REHAB | Age: 66
Setting detail: THERAPIES SERIES
End: 2021-02-26
Payer: COMMERCIAL

## 2021-03-01 ENCOUNTER — HOSPITAL ENCOUNTER (OUTPATIENT)
Dept: CARDIAC REHAB | Age: 66
Setting detail: THERAPIES SERIES
Discharge: HOME | End: 2021-03-01
Payer: COMMERCIAL

## 2021-03-01 PROCEDURE — 93798 PHYS/QHP OP CAR RHAB W/ECG: CPT

## 2021-03-01 ASSESSMENT — PATIENT HEALTH QUESTIONNAIRE - PHQ9: SUM OF ALL RESPONSES TO PHQ QUESTIONS 1-9: 3

## 2021-03-05 ENCOUNTER — HOSPITAL ENCOUNTER (OUTPATIENT)
Dept: CARDIAC REHAB | Age: 66
Setting detail: THERAPIES SERIES
End: 2021-03-05
Payer: COMMERCIAL

## 2021-03-08 ENCOUNTER — HOSPITAL ENCOUNTER (OUTPATIENT)
Dept: CARDIAC REHAB | Age: 66
Setting detail: THERAPIES SERIES
Discharge: HOME | End: 2021-03-08
Payer: COMMERCIAL

## 2021-03-08 PROCEDURE — 93798 PHYS/QHP OP CAR RHAB W/ECG: CPT

## 2021-03-10 ENCOUNTER — HOSPITAL ENCOUNTER (OUTPATIENT)
Dept: CARDIAC REHAB | Age: 66
Setting detail: THERAPIES SERIES
Discharge: HOME | End: 2021-03-10
Payer: COMMERCIAL

## 2021-03-10 PROCEDURE — 93798 PHYS/QHP OP CAR RHAB W/ECG: CPT

## 2021-03-12 ENCOUNTER — HOSPITAL ENCOUNTER (OUTPATIENT)
Dept: CARDIAC REHAB | Age: 66
Setting detail: THERAPIES SERIES
Discharge: HOME | End: 2021-03-12
Payer: COMMERCIAL

## 2021-03-12 PROCEDURE — 93798 PHYS/QHP OP CAR RHAB W/ECG: CPT

## 2021-03-15 ENCOUNTER — HOSPITAL ENCOUNTER (OUTPATIENT)
Dept: CARDIAC REHAB | Age: 66
Setting detail: THERAPIES SERIES
Discharge: HOME | End: 2021-03-15
Payer: COMMERCIAL

## 2021-03-15 PROCEDURE — 93798 PHYS/QHP OP CAR RHAB W/ECG: CPT

## 2021-03-17 ENCOUNTER — HOSPITAL ENCOUNTER (OUTPATIENT)
Dept: CARDIAC REHAB | Age: 66
Setting detail: THERAPIES SERIES
Discharge: HOME | End: 2021-03-17
Payer: COMMERCIAL

## 2021-03-17 PROCEDURE — 93798 PHYS/QHP OP CAR RHAB W/ECG: CPT

## 2021-03-19 ENCOUNTER — HOSPITAL ENCOUNTER (OUTPATIENT)
Dept: CARDIAC REHAB | Age: 66
Setting detail: THERAPIES SERIES
Discharge: HOME | End: 2021-03-19
Payer: COMMERCIAL

## 2021-03-19 PROCEDURE — 93798 PHYS/QHP OP CAR RHAB W/ECG: CPT

## 2021-03-22 ENCOUNTER — HOSPITAL ENCOUNTER (OUTPATIENT)
Dept: CARDIAC REHAB | Age: 66
Setting detail: THERAPIES SERIES
End: 2021-03-22
Payer: COMMERCIAL

## 2021-03-24 ENCOUNTER — HOSPITAL ENCOUNTER (OUTPATIENT)
Dept: CARDIAC REHAB | Age: 66
Setting detail: THERAPIES SERIES
Discharge: HOME | End: 2021-03-24
Payer: COMMERCIAL

## 2021-03-24 PROCEDURE — 93798 PHYS/QHP OP CAR RHAB W/ECG: CPT

## 2021-03-26 ENCOUNTER — HOSPITAL ENCOUNTER (OUTPATIENT)
Dept: CARDIAC REHAB | Age: 66
Setting detail: THERAPIES SERIES
Discharge: HOME | End: 2021-03-26
Payer: COMMERCIAL

## 2021-03-26 PROCEDURE — 93798 PHYS/QHP OP CAR RHAB W/ECG: CPT

## 2021-03-29 ENCOUNTER — HOSPITAL ENCOUNTER (OUTPATIENT)
Dept: CARDIAC REHAB | Age: 66
Setting detail: THERAPIES SERIES
Discharge: HOME | End: 2021-03-29
Payer: COMMERCIAL

## 2021-03-29 PROCEDURE — 93798 PHYS/QHP OP CAR RHAB W/ECG: CPT

## 2021-03-29 ASSESSMENT — PATIENT HEALTH QUESTIONNAIRE - PHQ9: SUM OF ALL RESPONSES TO PHQ QUESTIONS 1-9: 7

## 2021-03-31 ENCOUNTER — HOSPITAL ENCOUNTER (OUTPATIENT)
Dept: CARDIAC REHAB | Age: 66
Setting detail: THERAPIES SERIES
End: 2021-03-31
Payer: COMMERCIAL

## 2021-03-31 RX ORDER — ROSUVASTATIN CALCIUM 10 MG/1
10 TABLET, COATED ORAL DAILY
COMMUNITY
End: 2021-05-06 | Stop reason: SDUPTHER

## 2021-04-01 ENCOUNTER — NURSE ONLY (OUTPATIENT)
Dept: ORTHOPEDIC SURGERY | Age: 66
End: 2021-04-01
Payer: MEDICARE

## 2021-04-01 ENCOUNTER — HOSPITAL ENCOUNTER (OUTPATIENT)
Age: 66
Discharge: HOME | End: 2021-04-03
Payer: COMMERCIAL

## 2021-04-01 VITALS
SYSTOLIC BLOOD PRESSURE: 126 MMHG | BODY MASS INDEX: 27.99 KG/M2 | DIASTOLIC BLOOD PRESSURE: 80 MMHG | HEIGHT: 69 IN | OXYGEN SATURATION: 98 % | WEIGHT: 189 LBS | RESPIRATION RATE: 16 BRPM | HEART RATE: 63 BPM | TEMPERATURE: 97.1 F

## 2021-04-01 DIAGNOSIS — H26.9 CATARACT OF LEFT EYE, UNSPECIFIED CATARACT TYPE: ICD-10-CM

## 2021-04-01 DIAGNOSIS — M17.11 PRIMARY OSTEOARTHRITIS OF RIGHT KNEE: Primary | ICD-10-CM

## 2021-04-01 PROCEDURE — 20610 DRAIN/INJ JOINT/BURSA W/O US: CPT | Performed by: NURSE PRACTITIONER

## 2021-04-01 PROCEDURE — U0003 INFECTIOUS AGENT DETECTION BY NUCLEIC ACID (DNA OR RNA); SEVERE ACUTE RESPIRATORY SYNDROME CORONAVIRUS 2 (SARS-COV-2) (CORONAVIRUS DISEASE [COVID-19]), AMPLIFIED PROBE TECHNIQUE, MAKING USE OF HIGH THROUGHPUT TECHNOLOGIES AS DESCRIBED BY CMS-2020-01-R: HCPCS

## 2021-04-01 RX ORDER — IBUPROFEN 800 MG/1
800 TABLET ORAL DAILY
COMMUNITY
End: 2021-05-20

## 2021-04-01 RX ORDER — CLINDAMYCIN PHOSPHATE 10 MG/G
GEL TOPICAL 2 TIMES DAILY
COMMUNITY
End: 2021-05-20

## 2021-04-01 RX ORDER — GABAPENTIN 300 MG/1
300 CAPSULE ORAL EVERY EVENING
COMMUNITY
End: 2021-05-06 | Stop reason: SINTOL

## 2021-04-02 ENCOUNTER — APPOINTMENT (OUTPATIENT)
Dept: CARDIAC REHAB | Age: 66
End: 2021-04-02
Payer: MEDICARE

## 2021-04-03 LAB
SARS-COV-2: NOT DETECTED
SOURCE: NORMAL

## 2021-04-04 NOTE — H&P
History and Physical    Patient's Name/Date of Birth: Zane Hernández / [id number] (7 y.o.)    Date: April 4, 2021     Chief Complaint: Decreased vision of the left eye and chronic open angle glaucoma. Risks and complications as well as options and benefits were discussed with the patient and he has elected to proceed with left cataract extraction with intra ocular lens implant with IStent     HPI: Mature left cataract and chronic open angle Glaucoma    Past Medical History:   Diagnosis Date    Arthritis     Asthma     Breathing difficulty     CAD (coronary artery disease)     COPD (chronic obstructive pulmonary disease) ([address] [address]     Non-rheumatic tricuspid valve insufficiency     Nonrheumatic mitral (valve) insufficiency        Past Surgical History:   Procedure Laterality Date    APPENDECTOMY      COLONOSCOPY      CORONARY ANGIOPLASTY WITH STENT PLACEMENT  12/22/2020    DR. Mandel Resolute [place] DUANE 2.25x18 Mid LAD, Resolute [place] DUANE 2.5x18 Mid Cx. EF 65%    HERNIA REPAIR      OTHER SURGICAL HISTORY      right wrist carpal tunnel release, right long trigger finger release    OTHER SURGICAL HISTORY Right 01/20/2016    right shoulder arthroscopy acromioplasty with decompression rotator cuff repair     SHOULDER ARTHROSCOPY  3/4/11    left rotator cuff repair       Prior to Admission medications    Medication Sig Start Date End Date Taking? Authorizing Provider   rosuvastatin (CRESTOR) 10 MG tablet Take 10 mg by mouth daily   Yes Historical Provider, MD   clindamycin (CLINDAGEL) 1 % gel Apply topically 2 times daily Apply topically 2 times daily. Historical Provider, MD   gabapentin (NEURONTIN) 300 MG capsule Take 300 mg by mouth every evening.     Historical Provider, MD   ibuprofen (ADVIL;MOTRIN) 800 MG tablet Take 800 mg by mouth daily    Historical Provider, MD   aspirin 81 MG chewable tablet Take 1 tablet by mouth daily 12/24/20   Frieda Parks MD   ticagrelor (BRILINTA) 90 MG TABS tablet Take 1 tablet by mouth 2 times daily 12/23/20   Greyson Ramon MD   TRELEGY ELLIPTA 100-62.5-25 MCG/INH AEPB inhale 1 puff by mouth and INTO THE LUNGS once daily 11/30/20   Historical Provider, MD   omeprazole (PRILOSEC) 20 MG delayed release capsule Take 20 mg by mouth daily    Historical Provider, MD   albuterol (PROVENTIL) (2.5 MG/3ML) 0.083% nebulizer solution  1/28/20   Historical Provider, MD   Arginine 500 MG CAPS Take 1 capsule by mouth 6 times daily    Historical Provider, MD   Omega-3 Fatty Acids (FISH OIL) 1200 MG CAPS Take by mouth 2 times daily    Historical Provider, MD   Multiple Vitamins-Minerals (THERAPEUTIC MULTIVITAMIN-MINERALS) tablet Take 1 tablet by mouth 2 times daily    Historical Provider, MD       Patient has no known allergies.     Family History   Problem Relation Age of Onset    Heart Disease Father     Heart Attack Father     High Blood Pressure Father     High Cholesterol Father     Diabetes Father     Arrhythmia Sister        Social History     Socioeconomic History    Marital status:      Spouse name: Not on file    Number of children: Not on file    Years of education: Not on file    Highest education level: Not on file   Occupational History    Not on file   Social Needs    Financial resource strain: Not on file    Food insecurity     Worry: Not on file     Inability: Not on file   [email or web address] needs     Medical: Not on file     Non-medical: Not on file   Tobacco Use    Smoking status: Former Smoker     Packs/day: 1.50     Years: 40.00     Pack years: 60.00     Start date: 1/13/2016    Smokeless tobacco: Never Used   Substance and Sexual Activity    Alcohol use: No     Comment: 6 cups a day of coffee    Drug use: No    Sexual activity: Not on file   Lifestyle    Physical activity     Days per week: Not on file     Minutes per session: Not on file    Stress: Not on file   Relationships    Social connections     Talks on phone: Not on file     Gets together:

## 2021-04-05 ENCOUNTER — HOSPITAL ENCOUNTER (OUTPATIENT)
Dept: CARDIAC REHAB | Age: 66
Setting detail: THERAPIES SERIES
Discharge: HOME | End: 2021-04-05
Payer: MEDICARE

## 2021-04-05 ENCOUNTER — ANESTHESIA EVENT (OUTPATIENT)
Dept: OPERATING ROOM | Age: 66
End: 2021-04-05
Payer: MEDICARE

## 2021-04-05 PROCEDURE — 93798 PHYS/QHP OP CAR RHAB W/ECG: CPT

## 2021-04-05 NOTE — ANESTHESIA PRE PROCEDURE
Department of Anesthesiology  Preprocedure Note       Name:  Britton White   Age:  72 y.o.  :  1955                                          MRN:  [id number]         Date:  2021      Surgeon: Keli Yates):  Shyam Oropeza MD    Procedure: Procedure(s):  LEFT   CATARACT EXTRACTION  IOL IMPLANT I STENT    Medications prior to admission:   Prior to Admission medications    Medication Sig Start Date End Date Taking? Authorizing Provider   rosuvastatin (CRESTOR) 10 MG tablet Take 10 mg by mouth daily   Yes Historical Provider, MD   clindamycin (CLINDAGEL) 1 % gel Apply topically 2 times daily Apply topically 2 times daily. Historical Provider, MD   gabapentin (NEURONTIN) 300 MG capsule Take 300 mg by mouth every evening. Historical Provider, MD   ibuprofen (ADVIL;MOTRIN) 800 MG tablet Take 800 mg by mouth daily    Historical Provider, MD   aspirin 81 MG chewable tablet Take 1 tablet by mouth daily 20   Jaxson Bell MD   ticagrelor (BRILINTA) 90 MG TABS tablet Take 1 tablet by mouth 2 times daily 20   Jaxson Bell MD   TRELEGY ELLIPTA 100-62.5-25 MCG/INH AEPB inhale 1 puff by mouth and INTO THE LUNGS once daily 20   Historical Provider, MD   omeprazole (PRILOSEC) 20 MG delayed release capsule Take 20 mg by mouth daily    Historical Provider, MD   albuterol (PROVENTIL) (2.5 MG/3ML) 0.083% nebulizer solution  20   Historical Provider, MD   Arginine 500 MG CAPS Take 1 capsule by mouth 6 times daily    Historical Provider, MD   Omega-3 Fatty Acids (FISH OIL) 1200 MG CAPS Take by mouth 2 times daily    Historical Provider, MD   Multiple Vitamins-Minerals (THERAPEUTIC MULTIVITAMIN-MINERALS) tablet Take 1 tablet by mouth 2 times daily    Historical Provider, MD       Current medications:    No current facility-administered medications for this encounter.       Current Outpatient Medications   Medication Sig Dispense Refill    rosuvastatin (CRESTOR) 10 MG tablet Take 10 mg by mouth daily      clindamycin (CLINDAGEL) 1 % gel Apply topically 2 times daily Apply topically 2 times daily.  gabapentin (NEURONTIN) 300 MG capsule Take 300 mg by mouth every evening.       ibuprofen (ADVIL;MOTRIN) 800 MG tablet Take 800 mg by mouth daily      aspirin 81 MG chewable tablet Take 1 tablet by mouth daily 30 tablet 3    ticagrelor (BRILINTA) 90 MG TABS tablet Take 1 tablet by mouth 2 times daily 60 tablet 3    TRELEGY ELLIPTA 100-62.5-25 MCG/INH AEPB inhale 1 puff by mouth and INTO THE LUNGS once daily      omeprazole (PRILOSEC) 20 MG delayed release capsule Take 20 mg by mouth daily      albuterol (PROVENTIL) (2.5 MG/3ML) 0.083% nebulizer solution       Arginine 500 MG CAPS Take 1 capsule by mouth 6 times daily      Omega-3 Fatty Acids (FISH OIL) 1200 MG CAPS Take by mouth 2 times daily      Multiple Vitamins-Minerals (THERAPEUTIC MULTIVITAMIN-MINERALS) tablet Take 1 tablet by mouth 2 times daily         Allergies:  No Known Allergies    Problem List:    Patient Active Problem List   Diagnosis Code    Rotator cuff tear M75.100    Subacromial impingement M75.40    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    Shoulder pain M25.519    CMC arthritis, thumb, degenerative M18.9    Hand arthritis M19.049    Hand pain M79.643    Trigger finger M65.30    CTS (carpal tunnel syndrome) G56.00    Complete tear of right rotator cuff M75.121    Right shoulder pain M25.511    Pulmonary emphysema (HCC) J43.9    Tear of right glenoid labrum S43.431A    Lateral epicondylitis of left elbow M77.12    Biceps tendonitis M75.20    CAD in native artery I25.10       Past Medical History:        Diagnosis Date    Arthritis     Asthma     Breathing difficulty     CAD (coronary artery disease)     COPD (chronic obstructive pulmonary disease) (HCC)     Non-rheumatic tricuspid valve insufficiency     Nonrheumatic mitral (valve) insufficiency        Past Surgical History:        Procedure Laterality Date    APPENDECTOMY      COLONOSCOPY      CORONARY ANGIOPLASTY WITH STENT PLACEMENT  12/22/2020    DR. Mandel Resolute Christopher DUANE 2.25x18 Mid LAD, Resolute Christopher DUANE 2.5x18 Mid Cx. EF 65%    HERNIA REPAIR      OTHER SURGICAL HISTORY      right wrist carpal tunnel release, right long trigger finger release    OTHER SURGICAL HISTORY Right 01/20/2016    right shoulder arthroscopy acromioplasty with decompression rotator cuff repair     SHOULDER ARTHROSCOPY  3/4/11    left rotator cuff repair       Social History:    Social History     Tobacco Use    Smoking status: Former Smoker     Packs/day: 1.50     Years: 40.00     Pack years: 60.00     Start date: 1/13/2016    Smokeless tobacco: Never Used   Substance Use Topics    Alcohol use: No     Comment: 6 cups a day of coffee                                Counseling given: Not Answered      Vital Signs (Current):   Vitals:    03/31/21 1559   Weight: 190 lb (86.2 kg)   Height: 5' 10\" (1.778 m)                                              BP Readings from Last 3 Encounters:   02/11/21 126/80   02/03/21 132/64   01/14/21 136/64       NPO Status:                                                                                 BMI:   Wt Readings from Last 3 Encounters:   02/11/21 189 lb (85.7 kg)   02/03/21 190 lb (86.2 kg)   01/14/21 194 lb (88 kg)     Body mass index is 27.26 kg/m².     CBC:   Lab Results   Component Value Date    WBC 7.4 12/21/2020    RBC 4.92 12/21/2020    HGB 13.0 12/21/2020    HCT 41.4 12/21/2020    MCV 84.1 12/21/2020    RDW 14.1 12/21/2020     12/21/2020       CMP:   Lab Results   Component Value Date     12/23/2020    K 3.4 12/23/2020     12/23/2020    CO2 20 12/23/2020    BUN 15 12/23/2020    CREATININE 0.8 12/23/2020    GFRAA >60 12/23/2020    LABGLOM >60 12/23/2020    GLUCOSE 87 12/23/2020    GLUCOSE 82 01/04/2012    PROT 6.8 12/15/2017    CALCIUM 8.1 12/23/2020    BILITOT 0.8 12/15/2017    ALKPHOS 51 12/15/2017    AST 35 12/15/2017 ALT 43 12/15/2017       POC Tests: No results for input(s): POCGLU, POCNA, POCK, POCCL, POCBUN, POCHEMO, POCHCT in the last 72 hours. Coags: No results found for: PROTIME, INR, APTT    HCG (If Applicable): No results found for: PREGTESTUR, PREGSERUM, HCG, HCGQUANT     ABGs: No results found for: PHART, PO2ART, BJZ8CYR, ITJ2MBQ, BEART, O8IBXIOI     Type & Screen (If Applicable):  No results found for: LABABO, LABRH    Drug/Infectious Status (If Applicable):  No results found for: HIV, HEPCAB    COVID-19 Screening (If Applicable):   Lab Results   Component Value Date    COVID19 Not Detected 04/01/2021           Anesthesia Evaluation    Airway: Mallampati: III  TM distance: >3 FB   Neck ROM: full  Mouth opening: > = 3 FB Dental:          Pulmonary: breath sounds clear to auscultation  (+) COPD:  asthma:                            Cardiovascular:    (+) valvular problems/murmurs: MR, CAD:, CABG/stent:,         Rhythm: regular  Rate: normal                 ROS comment: Drug eluting stent in the distal LAD on 12/22/20. On Brilinta. EF normal. Mild MR and TR. Neuro/Psych:   (+) neuromuscular disease:,             GI/Hepatic/Renal:             Endo/Other:    (+) blood dyscrasia: anticoagulation therapy:., .                 Abdominal:       Abdomen: soft. Vascular:                                      Anesthesia Plan      MAC     ASA 3       Induction: intravenous. Anesthetic plan and risks discussed with patient. Plan discussed with CRNA. Chart reviewed, DOS assessment will be done by the anesthesiologist in charge. Eran Donohue MD   4/5/2021    Pt seen questions answered plan outlined H&P reviewed accepts MAC. Rayna Duke M.D 04/06/21.

## 2021-04-06 ENCOUNTER — ANESTHESIA (OUTPATIENT)
Dept: OPERATING ROOM | Age: 66
End: 2021-04-06
Payer: MEDICARE

## 2021-04-06 ENCOUNTER — HOSPITAL ENCOUNTER (OUTPATIENT)
Age: 66
Setting detail: OUTPATIENT SURGERY
Discharge: HOME | End: 2021-04-06
Attending: OPHTHALMOLOGY | Admitting: OPHTHALMOLOGY
Payer: MEDICARE

## 2021-04-06 VITALS
HEART RATE: 92 BPM | DIASTOLIC BLOOD PRESSURE: 67 MMHG | TEMPERATURE: 98 F | RESPIRATION RATE: 16 BRPM | SYSTOLIC BLOOD PRESSURE: 121 MMHG | WEIGHT: 190 LBS | BODY MASS INDEX: 27.2 KG/M2 | HEIGHT: 70 IN | OXYGEN SATURATION: 97 %

## 2021-04-06 VITALS
RESPIRATION RATE: 19 BRPM | SYSTOLIC BLOOD PRESSURE: 132 MMHG | DIASTOLIC BLOOD PRESSURE: 76 MMHG | OXYGEN SATURATION: 100 %

## 2021-04-06 DIAGNOSIS — H26.9 CATARACT OF LEFT EYE, UNSPECIFIED CATARACT TYPE: Primary | ICD-10-CM

## 2021-04-06 PROBLEM — H40.9 GLAUCOMA, LEFT EYE: Status: ACTIVE | Noted: 2021-04-06

## 2021-04-06 PROCEDURE — 6370000000 HC RX 637 (ALT 250 FOR IP): Performed by: OPHTHALMOLOGY

## 2021-04-06 PROCEDURE — 3600000013 HC SURGERY LEVEL 3 ADDTL 15MIN: Performed by: OPHTHALMOLOGY

## 2021-04-06 PROCEDURE — 2709999900 HC NON-CHARGEABLE SUPPLY: Performed by: OPHTHALMOLOGY

## 2021-04-06 PROCEDURE — 6360000002 HC RX W HCPCS: Performed by: NURSE ANESTHETIST, CERTIFIED REGISTERED

## 2021-04-06 PROCEDURE — 7100000010 HC PHASE II RECOVERY - FIRST 15 MIN: Performed by: OPHTHALMOLOGY

## 2021-04-06 PROCEDURE — 3600000003 HC SURGERY LEVEL 3 BASE: Performed by: OPHTHALMOLOGY

## 2021-04-06 PROCEDURE — 3700000001 HC ADD 15 MINUTES (ANESTHESIA): Performed by: OPHTHALMOLOGY

## 2021-04-06 PROCEDURE — 2580000003 HC RX 258: Performed by: ANESTHESIOLOGY

## 2021-04-06 PROCEDURE — C1783 OCULAR IMP, AQUEOUS DRAIN DE: HCPCS | Performed by: OPHTHALMOLOGY

## 2021-04-06 PROCEDURE — 2500000003 HC RX 250 WO HCPCS: Performed by: OPHTHALMOLOGY

## 2021-04-06 PROCEDURE — V2632 POST CHMBR INTRAOCULAR LENS: HCPCS | Performed by: OPHTHALMOLOGY

## 2021-04-06 PROCEDURE — 7100000011 HC PHASE II RECOVERY - ADDTL 15 MIN: Performed by: OPHTHALMOLOGY

## 2021-04-06 PROCEDURE — 2500000003 HC RX 250 WO HCPCS: Performed by: NURSE ANESTHETIST, CERTIFIED REGISTERED

## 2021-04-06 PROCEDURE — 3700000000 HC ANESTHESIA ATTENDED CARE: Performed by: OPHTHALMOLOGY

## 2021-04-06 DEVICE — SYSTEM MIC BYPS AD H360UM DIA230UM STEARALKONIUM HEP TI: Type: IMPLANTABLE DEVICE | Site: EYE | Status: FUNCTIONAL

## 2021-04-06 DEVICE — LENS INTOCU +17.5 DIOPT A CONSTANT 118.8 L13MM DIA6MM 0DEG: Type: IMPLANTABLE DEVICE | Site: EYE | Status: FUNCTIONAL

## 2021-04-06 RX ORDER — CYCLOPENTOLATE HYDROCHLORIDE 10 MG/ML
1 SOLUTION/ DROPS OPHTHALMIC
Status: CANCELLED | OUTPATIENT
Start: 2021-04-06 | End: 2021-04-06

## 2021-04-06 RX ORDER — BALANCED SALT SOLUTION 6.4; .75; .48; .3; 3.9; 1.7 MG/ML; MG/ML; MG/ML; MG/ML; MG/ML; MG/ML
SOLUTION OPHTHALMIC PRN
Status: DISCONTINUED | OUTPATIENT
Start: 2021-04-06 | End: 2021-04-06 | Stop reason: ALTCHOICE

## 2021-04-06 RX ORDER — TETRACAINE HYDROCHLORIDE 5 MG/ML
1 SOLUTION OPHTHALMIC ONCE
Status: CANCELLED | OUTPATIENT
Start: 2021-04-06 | End: 2021-04-06

## 2021-04-06 RX ORDER — PHENYLEPHRINE HYDROCHLORIDE 100 MG/ML
1 SOLUTION/ DROPS OPHTHALMIC PRN
Status: CANCELLED | OUTPATIENT
Start: 2021-04-06

## 2021-04-06 RX ORDER — TETRACAINE HYDROCHLORIDE 5 MG/ML
SOLUTION OPHTHALMIC PRN
Status: DISCONTINUED | OUTPATIENT
Start: 2021-04-06 | End: 2021-04-06 | Stop reason: ALTCHOICE

## 2021-04-06 RX ORDER — ALFENTANIL HYDROCHLORIDE 500 UG/ML
INJECTION INTRAVENOUS PRN
Status: DISCONTINUED | OUTPATIENT
Start: 2021-04-06 | End: 2021-04-06 | Stop reason: SDUPTHER

## 2021-04-06 RX ORDER — SODIUM CHLORIDE, SODIUM LACTATE, POTASSIUM CHLORIDE, CALCIUM CHLORIDE 600; 310; 30; 20 MG/100ML; MG/100ML; MG/100ML; MG/100ML
INJECTION, SOLUTION INTRAVENOUS CONTINUOUS
Status: DISCONTINUED | OUTPATIENT
Start: 2021-04-06 | End: 2021-04-06 | Stop reason: HOSPADM

## 2021-04-06 RX ORDER — MIDAZOLAM HYDROCHLORIDE 1 MG/ML
INJECTION INTRAMUSCULAR; INTRAVENOUS PRN
Status: DISCONTINUED | OUTPATIENT
Start: 2021-04-06 | End: 2021-04-06 | Stop reason: SDUPTHER

## 2021-04-06 RX ORDER — TETRACAINE HYDROCHLORIDE 5 MG/ML
1 SOLUTION OPHTHALMIC ONCE
Status: COMPLETED | OUTPATIENT
Start: 2021-04-06 | End: 2021-04-06

## 2021-04-06 RX ORDER — FLURBIPROFEN SODIUM 0.3 MG/ML
1 SOLUTION/ DROPS OPHTHALMIC
Status: CANCELLED | OUTPATIENT
Start: 2021-04-06 | End: 2021-04-06

## 2021-04-06 RX ORDER — FLURBIPROFEN SODIUM 0.3 MG/ML
1 SOLUTION/ DROPS OPHTHALMIC
Status: COMPLETED | OUTPATIENT
Start: 2021-04-06 | End: 2021-04-06

## 2021-04-06 RX ORDER — PHENYLEPHRINE HCL 2.5 %
1 DROPS OPHTHALMIC (EYE)
Status: COMPLETED | OUTPATIENT
Start: 2021-04-06 | End: 2021-04-06

## 2021-04-06 RX ORDER — PHENYLEPHRINE HCL 2.5 %
1 DROPS OPHTHALMIC (EYE)
Status: CANCELLED | OUTPATIENT
Start: 2021-04-06 | End: 2021-04-06

## 2021-04-06 RX ORDER — CYCLOPENTOLATE HYDROCHLORIDE 10 MG/ML
1 SOLUTION/ DROPS OPHTHALMIC
Status: COMPLETED | OUTPATIENT
Start: 2021-04-06 | End: 2021-04-06

## 2021-04-06 RX ORDER — PHENYLEPHRINE HYDROCHLORIDE 100 MG/ML
1 SOLUTION/ DROPS OPHTHALMIC PRN
Status: DISCONTINUED | OUTPATIENT
Start: 2021-04-06 | End: 2021-04-06 | Stop reason: HOSPADM

## 2021-04-06 RX ADMIN — ALFENTANIL HYDROCHLORIDE 250 MCG: 500 INJECTION INTRAVENOUS at 09:05

## 2021-04-06 RX ADMIN — FLURBIPROFEN SODIUM 1 DROP: 0.3 SOLUTION/ DROPS OPHTHALMIC at 08:30

## 2021-04-06 RX ADMIN — FLURBIPROFEN SODIUM 1 DROP: 0.3 SOLUTION/ DROPS OPHTHALMIC at 08:35

## 2021-04-06 RX ADMIN — ALFENTANIL HYDROCHLORIDE 250 MCG: 500 INJECTION INTRAVENOUS at 09:03

## 2021-04-06 RX ADMIN — ALFENTANIL HYDROCHLORIDE 250 MCG: 500 INJECTION INTRAVENOUS at 09:12

## 2021-04-06 RX ADMIN — CYCLOPENTOLATE HYDROCHLORIDE 1 DROP: 10 SOLUTION/ DROPS OPHTHALMIC at 08:35

## 2021-04-06 RX ADMIN — PHENYLEPHRINE HYDROCHLORIDE 1 DROP: 25 SOLUTION/ DROPS OPHTHALMIC at 08:35

## 2021-04-06 RX ADMIN — PHENYLEPHRINE HYDROCHLORIDE 1 DROP: 25 SOLUTION/ DROPS OPHTHALMIC at 08:40

## 2021-04-06 RX ADMIN — MIDAZOLAM 2 MG: 1 INJECTION INTRAMUSCULAR; INTRAVENOUS at 09:03

## 2021-04-06 RX ADMIN — SODIUM CHLORIDE, POTASSIUM CHLORIDE, SODIUM LACTATE AND CALCIUM CHLORIDE: 600; 310; 30; 20 INJECTION, SOLUTION INTRAVENOUS at 08:35

## 2021-04-06 RX ADMIN — TETRACAINE HYDROCHLORIDE 1 DROP: 25 LIQUID OPHTHALMIC at 08:38

## 2021-04-06 RX ADMIN — CYCLOPENTOLATE HYDROCHLORIDE 1 DROP: 10 SOLUTION/ DROPS OPHTHALMIC at 08:40

## 2021-04-06 RX ADMIN — CYCLOPENTOLATE HYDROCHLORIDE 1 DROP: 10 SOLUTION/ DROPS OPHTHALMIC at 08:30

## 2021-04-06 RX ADMIN — PHENYLEPHRINE HYDROCHLORIDE 1 DROP: 25 SOLUTION/ DROPS OPHTHALMIC at 08:30

## 2021-04-06 RX ADMIN — FLURBIPROFEN SODIUM 1 DROP: 0.3 SOLUTION/ DROPS OPHTHALMIC at 08:40

## 2021-04-06 RX ADMIN — ALFENTANIL HYDROCHLORIDE 250 MCG: 500 INJECTION INTRAVENOUS at 09:09

## 2021-04-06 ASSESSMENT — PAIN - FUNCTIONAL ASSESSMENT: PAIN_FUNCTIONAL_ASSESSMENT: 0-10

## 2021-04-06 NOTE — OP NOTE
[address]                                OPERATIVE REPORT    PATIENT NAME: Jg Patterson                    :        1955  MED REC NO:   [id number]                            ROOM:  ACCOUNT NO:   [de-identified]                           ADMIT DATE: 2021  PROVIDER:     Sujey Mckeon MD    DATE OF PROCEDURE:  2021    PREOPERATIVE DIAGNOSES:  Left cataract and uncontrolled glaucoma. POSTOPERATIVE DIAGNOSES:  Left cataract and uncontrolled glaucoma. PROCEDURE:  Left phacoemulsification with intraocular lens implant and  iStent implantation. SURGEON:  Sujey Mckeon MD    ANESTHESIA OBTAINED:  Local.    EBL:  NONE    DESCRIPTION OF PROCEDURE:  The patient was brought into the operating  room. The operative eye was marked, which was the left eye. The left  eye was then prepped with a full-strength Betadine preparation. The  periorbital area was copiously washed with Betadine. The lashes were  washed with Betadine. Dilute Betadine was then also put in the inferior  and superior fornices and left in place for approximately a minute or 2. This was then irrigated with sterile water. The face was then wiped and  the preparation was repeated 1 more time. The drape was then placed  over the operative eye with the sticky adhesive placed at the lid  margins so that it draped the lashes out of the operative field  superiorly and inferiorly, as well as isolated the meibomian glands  behind the drape. This cleared the operative field of any meibomian  gland secretions and eyelashes. Next, a lid speculum was positioned in the left eye. A super sharp  blade was used to make a side-port incision at the 2 o'clock position. A clear corneal incision was fashioned over the 12 o'clock meridian with  a 3.2-mm keratome at the limbus. Healon was used to reform the anterior  chamber.   A continuous tear anterior capsulotomy was then performed with  a bent needle cystotome. Hydrodissection was performed by irrigating  with balanced salt solution through a syringe underneath the anterior  capsular flap to loosen and allow free rotation of the lens nucleus. Phacoemulsification was used and the entire lens nucleus was removed. The I&A unit was instilled in the eye, and the remaining cortex was  removed. Healon was used to separate the anterior and posterior  capsular flaps. The PCB00 +17.5 diopter lens was then instilled into  the capsular bag. The anterior chamber was then deepened, and using the  sideport incision, the iStent inject G2-W was placed at the 7 o'clock  and 10 o'clock position in the anterior trabecular meshwork. A small  amount of blood was seen to egress through the ostium indicating  adequate positioning. Healon was removed from in front of and behind  the lens. The wounds were checked and found to be airtight and  watertight. The lid speculum was removed, the drape was removed, and  the patient was brought to the recovery room in excellent condition and  discharged with postop instructions in excellent condition.         Josesito Suarez MD    D: 04/06/2021 10:21:04       T: 04/06/2021 14:33:23     [email or web address]  Job#: [id number]     Doc#: [id number]    CC:

## 2021-04-06 NOTE — H&P
Update History & Physical    The patient's History and Physical of 4 / 4 / 2021 was reviewed with the patient and there were no significant changes. I examined the patient and there were no significant changes from the previous History and Physical.    Plan: The risk, benefits, expected outcome, and alternative to the recommended procedure have been discussed with the patient. Patient understands and wants to proceed with the procedure.     Electronically signed by Justin Evans MD on 4/6/21 at 8:58 AM EDT

## 2021-04-06 NOTE — ANESTHESIA POSTPROCEDURE EVALUATION
Department of Anesthesiology  Postprocedure Note    Patient: Belkis Ortiz  MRN: [id number]  YOB: 1955  Date of evaluation: 4/6/2021  Time:  9:43 AM     Procedure Summary     Date: 04/06/21 Room / Location: [address] [place] [address]    Anesthesia Start: 6999 Anesthesia Stop: 7999    Procedure: LEFT   CATARACT EXTRACTION  IOL IMPLANT I STENT (Left ) Diagnosis: (CATARACT)    Surgeons: Nisa Robbins MD Responsible Provider: Trang Montez MD    Anesthesia Type: MAC ASA Status: 3          Anesthesia Type: MAC    Debra Phase I: Debra Score: 10    Debra Phase II: Debra Score: 10    Last vitals: Reviewed and per EMR flowsheets.        Anesthesia Post Evaluation    Patient location during evaluation: PACU  Patient participation: complete - patient participated  Level of consciousness: awake  Pain score: 3  Airway patency: patent  Nausea & Vomiting: no nausea  Complications: no  Cardiovascular status: blood pressure returned to baseline  Respiratory status: acceptable  Hydration status: euvolemic

## 2021-04-07 ENCOUNTER — HOSPITAL ENCOUNTER (OUTPATIENT)
Dept: CARDIAC REHAB | Age: 66
Setting detail: THERAPIES SERIES
End: 2021-04-07
Payer: MEDICARE

## 2021-04-08 ENCOUNTER — NURSE ONLY (OUTPATIENT)
Dept: ORTHOPEDIC SURGERY | Age: 66
End: 2021-04-08
Payer: MEDICARE

## 2021-04-08 DIAGNOSIS — M17.11 PRIMARY OSTEOARTHRITIS OF RIGHT KNEE: Primary | ICD-10-CM

## 2021-04-08 PROCEDURE — 20610 DRAIN/INJ JOINT/BURSA W/O US: CPT | Performed by: NURSE PRACTITIONER

## 2021-04-08 NOTE — PROGRESS NOTES
Chief Complaint   Patient presents with    Injections     right knee synvsic #2       Verbal and written consent was obtained by the patient. The following is a well known patient to me that is here synvisc number 2 injection. His knee was prepped in a sterile fashion. synvsic 16 mg was injected into the Right lateral knee . The patient tolerated the injection well. I will see the patient back in 1 weeks time for repeat injection. Isom Ormond was seen today for injections.     Diagnoses and all orders for this visit:    Primary osteoarthritis of right knee  -     87783 - MA DRAIN/INJECT LARGE JOINT/BURSA    Other orders  -     Hylan injection 16 mg

## 2021-04-09 DIAGNOSIS — M17.11 PRIMARY OSTEOARTHRITIS OF RIGHT KNEE: Primary | ICD-10-CM

## 2021-04-09 DIAGNOSIS — M25.522 LEFT ELBOW PAIN: ICD-10-CM

## 2021-04-15 ENCOUNTER — NURSE ONLY (OUTPATIENT)
Dept: ORTHOPEDIC SURGERY | Age: 66
End: 2021-04-15
Payer: MEDICARE

## 2021-04-15 DIAGNOSIS — M17.11 PRIMARY OSTEOARTHRITIS OF RIGHT KNEE: Primary | ICD-10-CM

## 2021-04-15 PROCEDURE — 20610 DRAIN/INJ JOINT/BURSA W/O US: CPT | Performed by: NURSE PRACTITIONER

## 2021-04-15 RX ORDER — HYALURONATE SODIUM 10 MG/ML
20 SYRINGE (ML) INTRAARTICULAR ONCE
Status: COMPLETED | OUTPATIENT
Start: 2021-04-15 | End: 2021-04-15

## 2021-04-15 RX ADMIN — Medication 20 MG: at 08:55

## 2021-04-15 NOTE — PROGRESS NOTES
Chief Complaint   Patient presents with    Injections     right knee euflexxa #3       Verbal and written consent was obtained by the patient. The following is a well known to me that is here for Euflexxa # 3. The knee was prepped in sterile fashion. Euflexxa 20 mg injected to Right knee. The patient tolerated the injections well and I will see the patient back in 1 month    Jose L Karen was seen today for injections.     Diagnoses and all orders for this visit:    Primary osteoarthritis of right knee  -     20610 - [place] DRAIN/INJECT LARGE JOINT/BURSA    Other orders  -     sodium hyaluronate (EUFLEXXA, HYALGAN) injection 20 mg

## 2021-04-19 DIAGNOSIS — M17.11 PRIMARY OSTEOARTHRITIS OF RIGHT KNEE: Primary | ICD-10-CM

## 2021-04-19 DIAGNOSIS — M25.522 LEFT ELBOW PAIN: ICD-10-CM

## 2021-04-20 ENCOUNTER — OFFICE VISIT (OUTPATIENT)
Dept: ORTHOPEDIC SURGERY | Age: 66
End: 2021-04-20
Payer: MEDICARE

## 2021-04-20 VITALS — BODY MASS INDEX: 27.2 KG/M2 | HEIGHT: 70 IN | TEMPERATURE: 98 F | WEIGHT: 190 LBS

## 2021-04-20 DIAGNOSIS — M19.029 ELBOW ARTHRITIS: Primary | ICD-10-CM

## 2021-04-20 PROCEDURE — 99214 OFFICE O/P EST MOD 30 MIN: CPT | Performed by: ORTHOPAEDIC SURGERY

## 2021-04-20 PROCEDURE — 20605 DRAIN/INJ JOINT/BURSA W/O US: CPT | Performed by: ORTHOPAEDIC SURGERY

## 2021-04-20 RX ORDER — KETOROLAC TROMETHAMINE 5 MG/ML
SOLUTION OPHTHALMIC
COMMUNITY
Start: 2021-04-15 | End: 2021-05-20

## 2021-04-20 RX ORDER — LATANOPROST 50 UG/ML
SOLUTION/ DROPS OPHTHALMIC
COMMUNITY
Start: 2021-04-15

## 2021-04-20 RX ORDER — TRIAMCINOLONE ACETONIDE 40 MG/ML
40 INJECTION, SUSPENSION INTRA-ARTICULAR; INTRAMUSCULAR ONCE
Status: COMPLETED | OUTPATIENT
Start: 2021-04-20 | End: 2021-04-20

## 2021-04-20 RX ORDER — OFLOXACIN 3 MG/ML
SOLUTION/ DROPS OPHTHALMIC
COMMUNITY
Start: 2021-04-15 | End: 2021-05-20

## 2021-04-20 RX ORDER — PREDNISOLONE ACETATE 10 MG/ML
SUSPENSION/ DROPS OPHTHALMIC
COMMUNITY
Start: 2021-04-15 | End: 2021-05-20

## 2021-04-20 RX ADMIN — TRIAMCINOLONE ACETONIDE 40 MG: 40 INJECTION, SUSPENSION INTRA-ARTICULAR; INTRAMUSCULAR at 12:01

## 2021-04-20 NOTE — PROGRESS NOTES
Chief Complaint   Patient presents with    Elbow Pain     left elbow pain for the past year and has gotten progressively worse       Rosalio Hernandez [unfilled]  presents today for evaluation of his left elbow pain. The patient states the pain started  1 years ago. The pain is aching in nature. It is worse with movement and better with rest.  The patient does complain of night pain. The patient is right hand dominant. Past Medical History:   Diagnosis Date    Arthritis     Asthma     Breathing difficulty     CAD (coronary artery disease)     COPD (chronic obstructive pulmonary disease) (HCC)     Non-rheumatic tricuspid valve insufficiency     Nonrheumatic mitral (valve) insufficiency      Past Surgical History:   Procedure Laterality Date    APPENDECTOMY      COLONOSCOPY      CORONARY ANGIOPLASTY WITH STENT PLACEMENT  12/22/2020    DR. Mandel Resolute Christopher DUANE 2.25x18 Mid LAD, Resolute [place] DUANE 2.5x18 Mid Cx.   EF 65%    HERNIA REPAIR      INTRACAPSULAR CATARACT EXTRACTION Left 4/6/2021    LEFT   CATARACT EXTRACTION  IOL IMPLANT I STENT performed by June Goodman MD at [place] [address]      right wrist carpal tunnel release, right long trigger finger release    OTHER SURGICAL HISTORY Right 01/20/2016    right shoulder arthroscopy acromioplasty with decompression rotator cuff repair     OTHER SURGICAL HISTORY Left 04/06/2021    LEFT EYE CATERACT EXTRACTION WITH INTRA OCULAR IMPLANT WITH I STENT    SHOULDER ARTHROSCOPY  3/4/11    left rotator cuff repair       Current Outpatient Medications:     prednisoLONE acetate (PRED FORTE) 1 % ophthalmic suspension, instill 1 drop into left eye three times a day, Disp: , Rfl:     ofloxacin (OCUFLOX) 0.3 % solution, instill 1 drop into left eye three times a day, Disp: , Rfl:     latanoprost (XALATAN) 0.005 % ophthalmic solution, instill 1 drop into both eyes at bedtime, Disp: , Rfl:     ketorolac (ACULAR) 0.5 % ophthalmic solution, instill 1 drop into left eye three times a day, Disp: , Rfl:     clindamycin (CLINDAGEL) 1 % gel, Apply topically 2 times daily Apply topically 2 times daily. , Disp: , Rfl:     gabapentin (NEURONTIN) 300 MG capsule, Take 300 mg by mouth every evening., Disp: , Rfl:     ibuprofen (ADVIL;MOTRIN) 800 MG tablet, Take 800 mg by mouth daily, Disp: , Rfl:     rosuvastatin (CRESTOR) 10 MG tablet, Take 10 mg by mouth daily, Disp: , Rfl:     aspirin 81 MG chewable tablet, Take 1 tablet by mouth daily, Disp: 30 tablet, Rfl: 3    ticagrelor (BRILINTA) 90 MG TABS tablet, Take 1 tablet by mouth 2 times daily, Disp: 60 tablet, Rfl: 3    TRELEGY ELLIPTA 100-62.5-25 MCG/INH AEPB, inhale 1 puff by mouth and INTO THE LUNGS once daily, Disp: , Rfl:     omeprazole (PRILOSEC) 20 MG delayed release capsule, Take 20 mg by mouth daily, Disp: , Rfl:     albuterol (PROVENTIL) (2.5 MG/3ML) 0.083% nebulizer solution, , Disp: , Rfl:     Arginine 500 MG CAPS, Take 1 capsule by mouth 6 times daily, Disp: , Rfl:     Omega-3 Fatty Acids (FISH OIL) 1200 MG CAPS, Take by mouth 2 times daily, Disp: , Rfl:     Multiple Vitamins-Minerals (THERAPEUTIC MULTIVITAMIN-MINERALS) tablet, Take 1 tablet by mouth 2 times daily, Disp: , Rfl:   No Known Allergies  Social History     Socioeconomic History    Marital status:      Spouse name: Not on file    Number of children: Not on file    Years of education: Not on file    Highest education level: Not on file   Occupational History    Not on file   Social Needs    Financial resource strain: Not on file    Food insecurity     Worry: Not on file     Inability: Not on file    Transportation needs     Medical: Not on file     Non-medical: Not on file   Tobacco Use    Smoking status: Former Smoker     Packs/day: 1.50     Years: 40.00     Pack years: 60.00     Start date: 1/13/2016    Smokeless tobacco: Never Used   Substance and Sexual Activity    Alcohol use:  No Comment: 6 cups a day of coffee    Drug use: No    Sexual activity: Not on file   Lifestyle    Physical activity     Days per week: Not on file     Minutes per session: Not on file    Stress: Not on file   Relationships    Social connections     Talks on phone: Not on file     Gets together: Not on file     Attends Faith service: Not on file     Active member of club or organization: Not on file     Attends meetings of clubs or organizations: Not on file     Relationship status: Not on file    Intimate partner violence     Fear of current or ex partner: Not on file     Emotionally abused: Not on file     Physically abused: Not on file     Forced sexual activity: Not on file   Other Topics Concern    Not on file   Social History Narrative    Not on file     Family History   Problem Relation Age of Onset    Heart Disease Father     Heart Attack Father     High Blood Pressure Father     High Cholesterol Father     Diabetes Father     Arrhythmia Sister        REVIEW OF SYSTEMS:     General/Constitution:  (-)weight loss, (-)fever, (-)chills, (-)weakness. Skin: (-) rash,(-) psoriasis,(-) eczema, (-)skin cancer. Musculoskeletal: (-) fractures,  (-) dislocations,(-) collagen vascular disease, (-) fibromyalgia, (-) multiple sclerosis, (-) muscular dystrophy, (-) RSD,(-) joint pain (-)swelling, (-) joint pain,swelling. Neurologic: (-) epilepsy, (-)seizures,(-) brain tumor,(-) TIA, (-)stroke, (-)headaches, (-)Parkinson disease,(-) memory loss, (-) LOC. Cardiovascular: (-) Chest pain, (-) swelling in legs/feet, (-) SOB, (-) cramping in legs/feet with walking. Respiratory: (-) SOB, (-) Coughing, (-) night sweats. GI: (-) nausea, (-) vomiting, (-) diarrhea, (-) blood in stool, (-) gastric ulcer. Psychiatric: (-) Depression, (-) Anxiety, (-) bipolar disease, (-) Alzheimer's Disease  Allergic/Immunologic: (-) allergies latex, (-) allergies metal, (-) skin sensitivity.   Hematlogic: (-) anemia, (-) blood is not evidence of scapular dyskinesis. There is not muscle atrophy in shoulder girdle. The range of motion for the Right Shoulder is 15/050/t8 and for the Left shoulder is 150/50/t8. Right shoulder Motor strength is 5/5 in the supraspinatus, 5/5 internal rotation and 5/5 in external rotation, and Left shoulder motor strength 5/5 in supraspinatus, 5/5 in internal rotation, 5/5 in external rotation. Right shoulder:  (-) Impingement , (-) [place] , (-) Speeds, (-) Apprehension ,(-) [place] Load Shift, (-) [employer], (-) Cross arm test.     Left shoulder:  (-) Impingement, (-) [place], (-) Speeds, (-) Apprehension,(-) [place], (-) Load Shift, (-) [employer],(-)  Cross arm test        Left  elbow: pain is located globally. Range of motion: R.Elbow flexion 140/extension 0; L. Elbow flexion 130/extension 10. ; Wrist ROM R wrist DF 90, VF 90, L wrist DF 90, VF 90, R pronation 90/ supination 90, L pronation 90/supination 90. There is swelling, there is not ecchymosis. Exam is compared bilaterally. Right:  Special tests: Cozen's sign negative. Reverse cozen sign negative    Left:  Special tests: Cozen's sign negative. Reverse cozen sign negative    Xray:  Joint narrowing    MRI:  n/a    Radiographic findings reviewed with patient    Assessment:   Encounter Diagnosis   Name Primary?  Elbow arthritis Yes       Plan:    Natural history and expected course discussed. Questions answered. Rest, ice, compression, and elevation (RICE) therapy. Reduction in offending activity. Tennis elbow splint. I had a lengthy discussion with the patient regarding their diagnosis. I explained treatment options including surgical vs non surgical treatment. I reviewed in detail the risks and benefits and outlined the procedure in detail with expected outcomes and possible complications.   I also discussed non surgical treatment such as injections (CSI and visco supplementation), physical therapy, topical creams and NSAID's. They have elected for conservative management at this time. Verbal and written consent for the injection was given by the patient. He was placed in a supine position and the left  elbow joint was cleaned in prepped with alcohol and betadine. He was injected with  1 ml of lidocaine and 1 ml of Kenalog 40 mg into the elbow. The patient tolerated the injection well.

## 2021-04-23 RX ORDER — ASPIRIN 81 MG/1
81 TABLET, CHEWABLE ORAL DAILY
Qty: 90 TABLET | Refills: 3 | Status: SHIPPED | OUTPATIENT
Start: 2021-04-23

## 2021-05-05 ENCOUNTER — HOSPITAL ENCOUNTER (OUTPATIENT)
Dept: CARDIAC REHAB | Age: 66
Setting detail: THERAPIES SERIES
Discharge: HOME | End: 2021-05-05
Payer: MEDICARE

## 2021-05-05 PROCEDURE — 93798 PHYS/QHP OP CAR RHAB W/ECG: CPT

## 2021-05-05 ASSESSMENT — PATIENT HEALTH QUESTIONNAIRE - PHQ9
SUM OF ALL RESPONSES TO PHQ QUESTIONS 1-9: 8
SUM OF ALL RESPONSES TO PHQ QUESTIONS 1-9: 8

## 2021-05-06 ENCOUNTER — OFFICE VISIT (OUTPATIENT)
Dept: PRIMARY CARE CLINIC | Age: 66
End: 2021-05-06
Payer: MEDICARE

## 2021-05-06 VITALS
WEIGHT: 187 LBS | OXYGEN SATURATION: 98 % | HEART RATE: 70 BPM | SYSTOLIC BLOOD PRESSURE: 122 MMHG | HEIGHT: 70 IN | DIASTOLIC BLOOD PRESSURE: 70 MMHG | TEMPERATURE: 98 F | BODY MASS INDEX: 26.77 KG/M2

## 2021-05-06 DIAGNOSIS — M51.16 HERNIATION OF NUCLEUS PULPOSUS OF LUMBAR INTERVERTEBRAL DISC WITH SCIATICA: ICD-10-CM

## 2021-05-06 DIAGNOSIS — J44.1 ACUTE EXACERBATION OF CHRONIC OBSTRUCTIVE PULMONARY DISEASE (COPD) (HCC): Primary | ICD-10-CM

## 2021-05-06 DIAGNOSIS — Z95.5 STENTED CORONARY ARTERY: ICD-10-CM

## 2021-05-06 DIAGNOSIS — R41.3 MEMORY DEFICIT: ICD-10-CM

## 2021-05-06 DIAGNOSIS — M54.16 LUMBAR RADICULOPATHY: ICD-10-CM

## 2021-05-06 DIAGNOSIS — I25.10 CAD IN NATIVE ARTERY: ICD-10-CM

## 2021-05-06 DIAGNOSIS — E78.2 MIXED HYPERLIPIDEMIA: ICD-10-CM

## 2021-05-06 DIAGNOSIS — J43.9 PULMONARY EMPHYSEMA, UNSPECIFIED EMPHYSEMA TYPE (HCC): ICD-10-CM

## 2021-05-06 PROCEDURE — 99215 OFFICE O/P EST HI 40 MIN: CPT | Performed by: INTERNAL MEDICINE

## 2021-05-06 RX ORDER — FLUTICASONE FUROATE, UMECLIDINIUM BROMIDE AND VILANTEROL TRIFENATATE 100; 62.5; 25 UG/1; UG/1; UG/1
POWDER RESPIRATORY (INHALATION)
Qty: 3 EACH | Refills: 0 | Status: SHIPPED
Start: 2021-05-06 | End: 2021-08-19 | Stop reason: SDUPTHER

## 2021-05-06 RX ORDER — ROSUVASTATIN CALCIUM 10 MG/1
10 TABLET, COATED ORAL DAILY
Qty: 90 TABLET | Refills: 0 | Status: SHIPPED
Start: 2021-05-06 | End: 2021-08-19 | Stop reason: SDUPTHER

## 2021-05-06 RX ORDER — GABAPENTIN 300 MG/1
300 CAPSULE ORAL EVERY EVENING
Qty: 92 CAPSULE | Refills: 0 | Status: CANCELLED | OUTPATIENT
Start: 2021-05-06 | End: 2021-08-06

## 2021-05-06 RX ORDER — METHYLPREDNISOLONE 4 MG/1
TABLET ORAL
Qty: 1 KIT | Refills: 0 | Status: SHIPPED | OUTPATIENT
Start: 2021-05-06 | End: 2021-05-12

## 2021-05-06 RX ORDER — OMEPRAZOLE 20 MG/1
20 CAPSULE, DELAYED RELEASE ORAL DAILY
Qty: 90 CAPSULE | Refills: 0 | Status: CANCELLED | OUTPATIENT
Start: 2021-05-06

## 2021-05-06 NOTE — PROGRESS NOTES
Atraumatic, normocephalic, PERRLA, EOMI, clear conjunctiva, TMs clear, nose-clear, throat - no erythema  Neck:  Supple, no goiter, no carotid bruits, no LAD  Lungs: Diffuse expiratory rhonchi and scattered wheezes bilaterally resonant to percussion  Heart:  RRR, no murmurs, gallops or rubs  Abdomen:  Soft/nt/nd, + bowel sounds  Extremities:  No clubbing, cyanosis or edema  Skin: unremarkable    Hemoglobin A1C   Date Value Ref Range Status   12/23/2020 5.4 4.0 - 5.6 % Final     Cholesterol, Total   Date Value Ref Range Status   12/23/2020 141 0 - 199 mg/dL Final     Triglycerides   Date Value Ref Range Status   12/23/2020 61 0 - 149 mg/dL Final     HDL   Date Value Ref Range Status   12/23/2020 40 >40 mg/dL Final     VLDL Cholesterol Calculated   Date Value Ref Range Status   12/23/2020 12 mg/dL Final     Sodium   Date Value Ref Range Status   12/23/2020 140 132 - 146 mmol/L Final     Potassium reflex Magnesium   Date Value Ref Range Status   12/23/2020 3.4 (L) 3.5 - 5.0 mmol/L Final     Chloride   Date Value Ref Range Status   12/23/2020 110 (H) 98 - 107 mmol/L Final     CO2   Date Value Ref Range Status   12/23/2020 20 (L) 22 - 29 mmol/L Final     BUN   Date Value Ref Range Status   12/23/2020 15 8 - 23 mg/dL Final     CREATININE   Date Value Ref Range Status   12/23/2020 0.8 0.7 - 1.2 mg/dL Final     Glucose   Date Value Ref Range Status   12/23/2020 87 74 - 99 mg/dL Final     Calcium   Date Value Ref Range Status   12/23/2020 8.1 (L) 8.6 - 10.2 mg/dL Final     Total Protein   Date Value Ref Range Status   12/15/2017 6.8 6.4 - 8.3 g/dL Final     Albumin   Date Value Ref Range Status   12/15/2017 4.4 3.5 - 5.2 g/dL Final     Total Bilirubin   Date Value Ref Range Status   12/15/2017 0.8 0.0 - 1.2 mg/dL Final     Alkaline Phosphatase   Date Value Ref Range Status   12/15/2017 51 40 - 129 U/L Final     AST   Date Value Ref Range Status   12/15/2017 35 0 - 39 U/L Final     ALT   Date Value Ref Range Status 12/15/2017 43 (H) 0 - 40 U/L Final     GFR Non-   Date Value Ref Range Status   12/23/2020 >60 >=60 mL/min/1.73 Final     Comment:     Chronic Kidney Disease: less than 60 ml/min/1.73 sq.m. Kidney Failure: less than 15 ml/min/1.73 sq.m. Results valid for patients 18 years and older. GFR    Date Value Ref Range Status   12/23/2020 >60  Final        Xr Elbow Left (2 Views)    Result Date: 4/20/2021  EXAMINATION: TWO XRAY VIEWS OF THE LEFT ELBOW 4/20/2021 11:02 am COMPARISON: November 17, 2016. HISTORY: ORDERING SYSTEM PROVIDED HISTORY: Left elbow pain TECHNOLOGIST PROVIDED HISTORY: Reason for exam:->pain FINDINGS: AP and lateral views of the left elbow. No fracture, dislocation, or elbow joint effusion seen. Bone mineralization is normal for age. There are osteophytes and joint space narrowing in the ulnohumeral and radiocapitellar joints. Deformity of the lateral epicondyle again noted suggestive of tendinopathy/partial tearing. Moderate degenerative changes in the left elbow. Assessment/Plan:      Outpatient Encounter Medications as of 5/6/2021   Medication Sig Dispense Refill    ticagrelor (BRILINTA) 90 MG TABS tablet Take 1 tablet by mouth 2 times daily 180 tablet 3    rosuvastatin (CRESTOR) 10 MG tablet Take 1 tablet by mouth daily 90 tablet 0    TRELEGY ELLIPTA 100-62.5-25 MCG/INH AEPB inhale 1 puff by mouth and INTO THE LUNGS once daily 3 each 0    methylPREDNISolone (MEDROL DOSEPACK) 4 MG tablet Take by mouth.  1 kit 0    aspirin 81 MG chewable tablet Take 1 tablet by mouth daily 90 tablet 3    latanoprost (XALATAN) 0.005 % ophthalmic solution instill 1 drop into both eyes at bedtime      ibuprofen (ADVIL;MOTRIN) 800 MG tablet Take 800 mg by mouth daily      albuterol (PROVENTIL) (2.5 MG/3ML) 0.083% nebulizer solution       Arginine 500 MG CAPS Take 2 capsules by mouth 3 times daily       Omega-3 Fatty Acids (FISH OIL) 1200 MG CAPS Take by mouth 2 times daily      Multiple Vitamins-Minerals (THERAPEUTIC MULTIVITAMIN-MINERALS) tablet Take 1 tablet by mouth 2 times daily      prednisoLONE acetate (PRED FORTE) 1 % ophthalmic suspension instill 1 drop into left eye three times a day      ofloxacin (OCUFLOX) 0.3 % solution instill 1 drop into left eye three times a day      ketorolac (ACULAR) 0.5 % ophthalmic solution instill 1 drop into left eye three times a day      clindamycin (CLINDAGEL) 1 % gel Apply topically 2 times daily Apply topically 2 times daily.  [DISCONTINUED] gabapentin (NEURONTIN) 300 MG capsule Take 300 mg by mouth every evening.  [DISCONTINUED] rosuvastatin (CRESTOR) 10 MG tablet Take 10 mg by mouth daily      [DISCONTINUED] TRELEGY ELLIPTA 100-62.5-25 MCG/INH AEPB inhale 1 puff by mouth and INTO THE LUNGS once daily      [DISCONTINUED] omeprazole (PRILOSEC) 20 MG delayed release capsule Take 20 mg by mouth daily       No facility-administered encounter medications on file as of 5/6/2021. Jennifer Henning was seen today for hyperlipidemia and other. Diagnoses and all orders for this visit:    Acute exacerbation of chronic obstructive pulmonary disease (COPD) ([address]  -     methylPREDNISolone (MEDROL DOSEPACK) 4 MG tablet; Take by mouth. Pulmonary emphysema, unspecified emphysema type ([address]  -     TRELEGY ELLIPTA 100-62.5-25 MCG/INH AEPB; inhale 1 puff by mouth and INTO THE LUNGS once daily    CAD in native artery  -     CBC Auto Differential; Future    Mixed hyperlipidemia  -     rosuvastatin (CRESTOR) 10 MG tablet; Take 1 tablet by mouth daily  -     Comprehensive Metabolic Panel; Future  -     Lipid Panel; Future    Stented coronary artery    Lumbar radiculopathy    Herniation of nucleus pulposus of lumbar intervertebral disc with sciatica    Memory deficit         There are no Patient Instructions on file for this visit.            Fabricio Leon MD   5/6/21

## 2021-05-07 ENCOUNTER — HOSPITAL ENCOUNTER (OUTPATIENT)
Dept: CARDIAC REHAB | Age: 66
Setting detail: THERAPIES SERIES
Discharge: HOME | End: 2021-05-07
Payer: MEDICARE

## 2021-05-07 PROCEDURE — 93798 PHYS/QHP OP CAR RHAB W/ECG: CPT

## 2021-05-10 ENCOUNTER — HOSPITAL ENCOUNTER (OUTPATIENT)
Dept: CARDIAC REHAB | Age: 66
Setting detail: THERAPIES SERIES
Discharge: HOME | End: 2021-05-10
Payer: MEDICARE

## 2021-05-10 PROCEDURE — 93798 PHYS/QHP OP CAR RHAB W/ECG: CPT

## 2021-05-12 ENCOUNTER — HOSPITAL ENCOUNTER (OUTPATIENT)
Dept: CARDIAC REHAB | Age: 66
Setting detail: THERAPIES SERIES
Discharge: HOME | End: 2021-05-12
Payer: MEDICARE

## 2021-05-12 PROCEDURE — 93798 PHYS/QHP OP CAR RHAB W/ECG: CPT

## 2021-05-14 ENCOUNTER — HOSPITAL ENCOUNTER (OUTPATIENT)
Dept: CARDIAC REHAB | Age: 66
Setting detail: THERAPIES SERIES
Discharge: HOME | End: 2021-05-14
Payer: MEDICARE

## 2021-05-14 PROCEDURE — 93798 PHYS/QHP OP CAR RHAB W/ECG: CPT

## 2021-05-17 ENCOUNTER — HOSPITAL ENCOUNTER (OUTPATIENT)
Dept: CARDIAC REHAB | Age: 66
Setting detail: THERAPIES SERIES
End: 2021-05-17
Payer: MEDICARE

## 2021-05-17 DIAGNOSIS — E78.2 MIXED HYPERLIPIDEMIA: ICD-10-CM

## 2021-05-17 DIAGNOSIS — I25.10 CAD IN NATIVE ARTERY: ICD-10-CM

## 2021-05-17 LAB
BASOPHILS ABSOLUTE: 0.06 E9/L (ref 0–0.2)
BASOPHILS RELATIVE PERCENT: 0.8 % (ref 0–2)
EOSINOPHILS ABSOLUTE: 0.27 E9/L (ref 0.05–0.5)
EOSINOPHILS RELATIVE PERCENT: 3.4 % (ref 0–6)
HCT VFR BLD CALC: 38.3 % (ref 37–54)
HEMOGLOBIN: 12 G/DL (ref 12.5–16.5)
IMMATURE GRANULOCYTES #: 0.08 E9/L
IMMATURE GRANULOCYTES %: 1 % (ref 0–5)
LYMPHOCYTES ABSOLUTE: 1.63 E9/L (ref 1.5–4)
LYMPHOCYTES RELATIVE PERCENT: 20.4 % (ref 20–42)
MCH RBC QN AUTO: 25.9 PG (ref 26–35)
MCHC RBC AUTO-ENTMCNC: 31.3 % (ref 32–34.5)
MCV RBC AUTO: 82.5 FL (ref 80–99.9)
MONOCYTES ABSOLUTE: 0.96 E9/L (ref 0.1–0.95)
MONOCYTES RELATIVE PERCENT: 12 % (ref 2–12)
NEUTROPHILS ABSOLUTE: 4.99 E9/L (ref 1.8–7.3)
NEUTROPHILS RELATIVE PERCENT: 62.4 % (ref 43–80)
PDW BLD-RTO: 14.5 FL (ref 11.5–15)
PLATELET # BLD: 243 E9/L (ref 130–450)
PMV BLD AUTO: 9.1 FL (ref 7–12)
RBC # BLD: 4.64 E12/L (ref 3.8–5.8)
WBC # BLD: 8 E9/L (ref 4.5–11.5)

## 2021-05-18 LAB
ALBUMIN SERPL-MCNC: 4.1 G/DL (ref 3.5–5.2)
ALP BLD-CCNC: 51 U/L (ref 40–129)
ALT SERPL-CCNC: 50 U/L (ref 0–40)
ANION GAP SERPL CALCULATED.3IONS-SCNC: 12 MMOL/L (ref 7–16)
AST SERPL-CCNC: 40 U/L (ref 0–39)
BILIRUB SERPL-MCNC: 0.4 MG/DL (ref 0–1.2)
BUN BLDV-MCNC: 17 MG/DL (ref 6–23)
CALCIUM SERPL-MCNC: 9.1 MG/DL (ref 8.6–10.2)
CHLORIDE BLD-SCNC: 106 MMOL/L (ref 98–107)
CHOLESTEROL, FASTING: 99 MG/DL (ref 0–199)
CO2: 24 MMOL/L (ref 22–29)
CREAT SERPL-MCNC: 0.8 MG/DL (ref 0.7–1.2)
GFR AFRICAN AMERICAN: >60
GFR NON-AFRICAN AMERICAN: >60 ML/MIN/1.73
GLUCOSE BLD-MCNC: 90 MG/DL (ref 74–99)
HDLC SERPL-MCNC: 44 MG/DL
LDL CHOLESTEROL CALCULATED: 47 MG/DL (ref 0–99)
POTASSIUM SERPL-SCNC: 4.3 MMOL/L (ref 3.5–5)
SODIUM BLD-SCNC: 142 MMOL/L (ref 132–146)
TOTAL PROTEIN: 6.7 G/DL (ref 6.4–8.3)
TRIGLYCERIDE, FASTING: 42 MG/DL (ref 0–149)
VLDLC SERPL CALC-MCNC: 8 MG/DL

## 2021-05-19 ENCOUNTER — HOSPITAL ENCOUNTER (OUTPATIENT)
Dept: CARDIAC REHAB | Age: 66
Setting detail: THERAPIES SERIES
Discharge: HOME | End: 2021-05-19
Payer: MEDICARE

## 2021-05-19 PROCEDURE — 93798 PHYS/QHP OP CAR RHAB W/ECG: CPT

## 2021-05-20 ENCOUNTER — OFFICE VISIT (OUTPATIENT)
Dept: PRIMARY CARE CLINIC | Age: 66
End: 2021-05-20
Payer: MEDICARE

## 2021-05-20 VITALS
SYSTOLIC BLOOD PRESSURE: 120 MMHG | DIASTOLIC BLOOD PRESSURE: 60 MMHG | OXYGEN SATURATION: 95 % | HEART RATE: 72 BPM | TEMPERATURE: 98.2 F

## 2021-05-20 DIAGNOSIS — J20.9 ACUTE BRONCHITIS, UNSPECIFIED ORGANISM: ICD-10-CM

## 2021-05-20 DIAGNOSIS — Z95.5 PRESENCE OF STENT IN CORONARY ARTERY IN PATIENT WITH CORONARY ARTERY DISEASE: ICD-10-CM

## 2021-05-20 DIAGNOSIS — R74.8 ELEVATED LIVER ENZYMES: ICD-10-CM

## 2021-05-20 DIAGNOSIS — I25.10 PRESENCE OF STENT IN CORONARY ARTERY IN PATIENT WITH CORONARY ARTERY DISEASE: ICD-10-CM

## 2021-05-20 DIAGNOSIS — J43.9 PULMONARY EMPHYSEMA, UNSPECIFIED EMPHYSEMA TYPE (HCC): Primary | ICD-10-CM

## 2021-05-20 PROCEDURE — 99213 OFFICE O/P EST LOW 20 MIN: CPT | Performed by: INTERNAL MEDICINE

## 2021-05-20 RX ORDER — ALBUTEROL SULFATE 90 UG/1
AEROSOL, METERED RESPIRATORY (INHALATION)
COMMUNITY
Start: 2021-05-08 | End: 2021-12-30 | Stop reason: SDUPTHER

## 2021-05-20 NOTE — PROGRESS NOTES
Chief Complaint   Patient presents with    Other     f/u visit with labwork states he still gets very SOB        HPI:  Patient is here for follow-up . Patient feels better productive cough completely subsided still has shortness of breath with exertion he is compliant on Trelegy inhaler and uses rescue inhaler 4 times a day. Lab work showed hemoglobin 12 patient baseline is 14.5 AST 50 and ALT 50. Patient denied any alcoholic beverage intake stated that he last drink 21 years ago currently is on a statin    Past Medical History, Surgical History, and Family History has been reviewed and updated.     Review of Systems:  Constitutional:  No fever, no fatigue, no chills, no headaches, no weight change  Dermatology:  No rash, no mole, no dry or sensitive skin  ENT:  No cough, no sore throat, no sinus pain, no runny nose, no ear pain  Cardiology:  No chest pain, no palpitations, no leg edema, no shortness of breath, no PND  Gastroenterology:  No dysphagia, no abdominal pain, no nausea, no vomiting, no constipation, no diarrhea, no heartburn  Musculoskeletal:  No joint pain, no leg cramps, no back pain, no muscle aches  Respiratory:  No shortness of breath, no orthopnea, no wheezing, no OJEDA, no hemoptysis  Urology:  No blood in the urine, no urinary frequency, no urinary incontinence, no urinary urgency, no nocturia, no dysuria    Vitals:    05/20/21 1051   BP: 120/60   Site: Right Upper Arm   Position: Sitting   Cuff Size: Medium Adult   Pulse: 72   Temp: 98.2 °F (36.8 °C)   SpO2: 95%       General:  Patient alert and oriented x 3, NAD, pleasant  HEENT:  Atraumatic, normocephalic, PERRLA, EOMI, clear conjunctiva, TMs clear, nose-clear, throat - no erythema  Neck:  Supple, no goiter, no carotid bruits, no LAD  Lungs:  CTA , decreased air exchange bilaterally no wheezing no rales  Heart:  RRR, no murmurs, gallops or rubs  Abdomen:  Soft/nt/nd, + bowel sounds  Extremities:  No clubbing, cyanosis or edema  Skin: unremarkable    Hemoglobin A1C   Date Value Ref Range Status   12/23/2020 5.4 4.0 - 5.6 % Final     Cholesterol, Total   Date Value Ref Range Status   12/23/2020 141 0 - 199 mg/dL Final     Triglycerides   Date Value Ref Range Status   12/23/2020 61 0 - 149 mg/dL Final     HDL   Date Value Ref Range Status   05/17/2021 44 >40 mg/dL Final     LDL Calculated   Date Value Ref Range Status   05/17/2021 47 0 - 99 mg/dL Final     VLDL Cholesterol Calculated   Date Value Ref Range Status   05/17/2021 8 mg/dL Final     Sodium   Date Value Ref Range Status   05/17/2021 142 132 - 146 mmol/L Final     Potassium   Date Value Ref Range Status   05/17/2021 4.3 3.5 - 5.0 mmol/L Final     Chloride   Date Value Ref Range Status   05/17/2021 106 98 - 107 mmol/L Final     CO2   Date Value Ref Range Status   05/17/2021 24 22 - 29 mmol/L Final     BUN   Date Value Ref Range Status   05/17/2021 17 6 - 23 mg/dL Final     CREATININE   Date Value Ref Range Status   05/17/2021 0.8 0.7 - 1.2 mg/dL Final     Glucose   Date Value Ref Range Status   05/17/2021 90 74 - 99 mg/dL Final     Calcium   Date Value Ref Range Status   05/17/2021 9.1 8.6 - 10.2 mg/dL Final     Total Protein   Date Value Ref Range Status   05/17/2021 6.7 6.4 - 8.3 g/dL Final     Albumin   Date Value Ref Range Status   05/17/2021 4.1 3.5 - 5.2 g/dL Final     Total Bilirubin   Date Value Ref Range Status   05/17/2021 0.4 0.0 - 1.2 mg/dL Final     Alkaline Phosphatase   Date Value Ref Range Status   05/17/2021 51 40 - 129 U/L Final     AST   Date Value Ref Range Status   05/17/2021 40 (H) 0 - 39 U/L Final     ALT   Date Value Ref Range Status   05/17/2021 50 (H) 0 - 40 U/L Final     GFR Non-   Date Value Ref Range Status   05/17/2021 >60 >=60 mL/min/1.73 Final     Comment:     Chronic Kidney Disease: less than 60 ml/min/1.73 sq.m. Kidney Failure: less than 15 ml/min/1.73 sq.m. Results valid for patients 18 years and older.        GFR  Date Value Ref Range Status   05/17/2021 >60  Final        No results found. Assessment/Plan:      Outpatient Encounter Medications as of 5/20/2021   Medication Sig Dispense Refill    albuterol sulfate  (90 Base) MCG/ACT inhaler       ticagrelor (BRILINTA) 90 MG TABS tablet Take 1 tablet by mouth 2 times daily 180 tablet 3    rosuvastatin (CRESTOR) 10 MG tablet Take 1 tablet by mouth daily 90 tablet 0    TRELEGY ELLIPTA 100-62.5-25 MCG/INH AEPB inhale 1 puff by mouth and INTO THE LUNGS once daily 3 each 0    aspirin 81 MG chewable tablet Take 1 tablet by mouth daily 90 tablet 3    latanoprost (XALATAN) 0.005 % ophthalmic solution instill 1 drop into both eyes at bedtime      albuterol (PROVENTIL) (2.5 MG/3ML) 0.083% nebulizer solution       Arginine 500 MG CAPS Take 2 capsules by mouth 3 times daily       Omega-3 Fatty Acids (FISH OIL) 1200 MG CAPS Take by mouth 2 times daily      Multiple Vitamins-Minerals (THERAPEUTIC MULTIVITAMIN-MINERALS) tablet Take 1 tablet by mouth 2 times daily      prednisoLONE acetate (PRED FORTE) 1 % ophthalmic suspension instill 1 drop into left eye three times a day (Patient not taking: Reported on 5/20/2021)      ofloxacin (OCUFLOX) 0.3 % solution instill 1 drop into left eye three times a day (Patient not taking: Reported on 5/20/2021)      ketorolac (ACULAR) 0.5 % ophthalmic solution instill 1 drop into left eye three times a day (Patient not taking: Reported on 5/20/2021)      clindamycin (CLINDAGEL) 1 % gel Apply topically 2 times daily Apply topically 2 times daily. (Patient not taking: Reported on 5/20/2021)      ibuprofen (ADVIL;MOTRIN) 800 MG tablet Take 800 mg by mouth daily (Patient not taking: Reported on 5/20/2021)       No facility-administered encounter medications on file as of 5/20/2021. Mitzy Guerra was seen today for other.     Diagnoses and all orders for this visit:    Pulmonary emphysema, unspecified emphysema type ([address]  -     CBC Auto

## 2021-05-21 ENCOUNTER — HOSPITAL ENCOUNTER (OUTPATIENT)
Dept: CARDIAC REHAB | Age: 66
Setting detail: THERAPIES SERIES
End: 2021-05-21
Payer: MEDICARE

## 2021-05-24 ENCOUNTER — HOSPITAL ENCOUNTER (OUTPATIENT)
Dept: CARDIAC REHAB | Age: 66
Setting detail: THERAPIES SERIES
Discharge: HOME | End: 2021-05-24
Payer: MEDICARE

## 2021-05-24 PROCEDURE — 93798 PHYS/QHP OP CAR RHAB W/ECG: CPT

## 2021-05-26 ENCOUNTER — HOSPITAL ENCOUNTER (OUTPATIENT)
Dept: CARDIAC REHAB | Age: 66
Setting detail: THERAPIES SERIES
Discharge: HOME | End: 2021-05-26
Payer: MEDICARE

## 2021-05-26 PROCEDURE — 93798 PHYS/QHP OP CAR RHAB W/ECG: CPT

## 2021-05-28 ENCOUNTER — HOSPITAL ENCOUNTER (OUTPATIENT)
Dept: CARDIAC REHAB | Age: 66
Setting detail: THERAPIES SERIES
Discharge: HOME | End: 2021-05-28
Payer: MEDICARE

## 2021-05-28 PROCEDURE — 93798 PHYS/QHP OP CAR RHAB W/ECG: CPT

## 2021-06-02 ENCOUNTER — HOSPITAL ENCOUNTER (OUTPATIENT)
Dept: CARDIAC REHAB | Age: 66
Setting detail: THERAPIES SERIES
Discharge: HOME | End: 2021-06-02
Payer: MEDICARE

## 2021-06-02 PROCEDURE — 93798 PHYS/QHP OP CAR RHAB W/ECG: CPT

## 2021-06-03 ENCOUNTER — OFFICE VISIT (OUTPATIENT)
Dept: ORTHOPEDIC SURGERY | Age: 66
End: 2021-06-03
Payer: MEDICARE

## 2021-06-03 VITALS — BODY MASS INDEX: 26.48 KG/M2 | HEIGHT: 70 IN | WEIGHT: 185 LBS

## 2021-06-03 DIAGNOSIS — M19.029 ELBOW ARTHRITIS: Primary | ICD-10-CM

## 2021-06-03 PROCEDURE — 99213 OFFICE O/P EST LOW 20 MIN: CPT | Performed by: ORTHOPAEDIC SURGERY

## 2021-06-03 NOTE — PROGRESS NOTES
(XALATAN) 0.005 % ophthalmic solution, instill 1 drop into both eyes at bedtime, Disp: , Rfl:     albuterol (PROVENTIL) (2.5 MG/3ML) 0.083% nebulizer solution, , Disp: , Rfl:     Arginine 500 MG CAPS, Take 2 capsules by mouth 3 times daily , Disp: , Rfl:     Omega-3 Fatty Acids (FISH OIL) 1200 MG CAPS, Take by mouth 2 times daily, Disp: , Rfl:     Multiple Vitamins-Minerals (THERAPEUTIC MULTIVITAMIN-MINERALS) tablet, Take 1 tablet by mouth 2 times daily, Disp: , Rfl:   No Known Allergies  Social History     Socioeconomic History    Marital status:      Spouse name: Not on file    Number of children: Not on file    Years of education: Not on file    Highest education level: Not on file   Occupational History    Not on file   Tobacco Use    Smoking status: Former Smoker     Packs/day: 1.50     Years: 40.00     Pack years: 60.00     Types: Cigarettes     Start date:      Quit date:      Years since quittin.4    Smokeless tobacco: Never Used   Vaping Use    Vaping Use: Never used   Substance and Sexual Activity    Alcohol use: No     Comment: 6 cups a day of coffee    Drug use: No    Sexual activity: Not on file   Other Topics Concern    Not on file   Social History Narrative    Not on file     Social Determinants of Health     Financial Resource Strain:     Difficulty of Paying Living Expenses:    Food Insecurity:     Worried About Running Out of Food in the Last Year:     Ran Out of Food in the Last Year:    Transportation Needs:     Lack of Transportation (Medical):      Lack of Transportation (Non-Medical):    Physical Activity:     Days of Exercise per Week:     Minutes of Exercise per Session:    Stress:     Feeling of Stress :    Social Connections:     Frequency of Communication with Friends and Family:     Frequency of Social Gatherings with Friends and Family:     Attends Rastafari Services:     Active Member of Clubs or Organizations:     Attends Club or Organization Meetings:     Marital Status:    Intimate Partner Violence:     Fear of Current or Ex-Partner:     Emotionally Abused:     Physically Abused:     Sexually Abused:      Family History   Problem Relation Age of Onset    Heart Disease Father     Heart Attack Father     High Blood Pressure Father     High Cholesterol Father     Diabetes Father     Arrhythmia Sister        REVIEW OF SYSTEMS:     General/Constitution:  (-)weight loss, (-)fever, (-)chills, (-)weakness. Skin: (-) rash,(-) psoriasis,(-) eczema, (-)skin cancer. Musculoskeletal: (-) fractures,  (-) dislocations,(-) collagen vascular disease, (-) fibromyalgia, (-) multiple sclerosis, (-) muscular dystrophy, (-) RSD,(-) joint pain (-)swelling, (-) joint pain,swelling. Neurologic: (-) epilepsy, (-)seizures,(-) brain tumor,(-) TIA, (-)stroke, (-)headaches, (-)Parkinson disease,(-) memory loss, (-) LOC. Cardiovascular: (-) Chest pain, (-) swelling in legs/feet, (-) SOB, (-) cramping in legs/feet with walking. Respiratory: (-) SOB, (-) Coughing, (-) night sweats. GI: (-) nausea, (-) vomiting, (-) diarrhea, (-) blood in stool, (-) gastric ulcer. Psychiatric: (-) Depression, (-) Anxiety, (-) bipolar disease, (-) Alzheimer's Disease  Allergic/Immunologic: (-) allergies latex, (-) allergies metal, (-) skin sensitivity. Hematlogic: (-) anemia, (-) blood transfusion, (-) DVT/PE, (-) Clotting disorders       Subjective:      Constitution:    Ht 5' 10\" (1.778 m)   Wt 185 lb (83.9 kg)   BMI 26.54 kg/m²     Psycihatric:    The patient is alert and oriented x 3, appears to be stated age and in no distress. Respiratory:    Respiratory effort is not labored. Patient is not gasping. Palpation of the chest reveals no tactile fremitus. Skin:    Upon inspection: the skin appears warm, dry and intact. There is not a previous scar over the affected area. There is not any cellulitis, lymphedema or cutaneous lesions noted in the lower extremities. Upon palpation there is no induration noted. Neurologic:      Motor exam of the upper extremities show: The reflexes in biceps/triceps/brachioradialis are equal and symmetric. Sensory exam C5-T1 are normal bilaterally. Cardiovascular: The vascular exam is normal and is well perfused to distal extremities. There are 2+ radial pulses bilaterally, and motor and sensation is intact to median, ulnar, and radial, musclocutaneus, and axillary nerve distribution and grossly symmetric bilaterally. There is cap refill noted less than two seconds in all digits. There is not edema of the bilateral upper extremities. There is not varicosities noted in the distal extremities. Lymph:    Upon palpation,  there is no lymphadenopathy noted in bilateral upper extremities. Musculoskeletal:    Gait: normal; examination of the nails and digits reveal no cyanosis or clubbing. Cervical Exam:    On physical exam, Jenny Mcallister is well-developed, well-nourished, oriented to person, place and time. his gait is normal.  On evaluation of his cervical spine, he has full range of motion of the cervical spine without pain. There is no cervical tenderness to palpation. Shoulder Exam:     On evaluation of his bilaterally upper extremities, his bilateral shoulder has no deformity. There is not evidence of scapular dyskinesis. There is not muscle atrophy in shoulder girdle. The range of motion for the Right Shoulder is 15/050/t8 and for the Left shoulder is 150/50/t8. Right shoulder Motor strength is 5/5 in the supraspinatus, 5/5 internal rotation and 5/5 in external rotation, and Left shoulder motor strength 5/5 in supraspinatus, 5/5 in internal rotation, 5/5 in external rotation.        Right shoulder:  (-) Impingement , (-) [place] , (-) Speeds, (-) Apprehension ,(-) [place] Load Shift, (-) [employer], (-) Cross arm test.     Left shoulder:  (-) Impingement, (-) [place], (-) Speeds,

## 2021-06-04 ENCOUNTER — HOSPITAL ENCOUNTER (OUTPATIENT)
Dept: CARDIAC REHAB | Age: 66
Setting detail: THERAPIES SERIES
End: 2021-06-04
Payer: MEDICARE

## 2021-06-07 ENCOUNTER — HOSPITAL ENCOUNTER (OUTPATIENT)
Dept: CARDIAC REHAB | Age: 66
Setting detail: THERAPIES SERIES
Discharge: HOME | End: 2021-06-07
Payer: MEDICARE

## 2021-06-07 PROCEDURE — 93798 PHYS/QHP OP CAR RHAB W/ECG: CPT

## 2021-06-09 ENCOUNTER — HOSPITAL ENCOUNTER (OUTPATIENT)
Dept: CARDIAC REHAB | Age: 66
Setting detail: THERAPIES SERIES
Discharge: HOME | End: 2021-06-09
Payer: MEDICARE

## 2021-06-09 PROCEDURE — 93798 PHYS/QHP OP CAR RHAB W/ECG: CPT

## 2021-06-11 ENCOUNTER — HOSPITAL ENCOUNTER (OUTPATIENT)
Dept: CARDIAC REHAB | Age: 66
Setting detail: THERAPIES SERIES
Discharge: HOME | End: 2021-06-11
Payer: MEDICARE

## 2021-06-11 PROCEDURE — 93798 PHYS/QHP OP CAR RHAB W/ECG: CPT

## 2021-06-14 ENCOUNTER — HOSPITAL ENCOUNTER (OUTPATIENT)
Dept: CARDIAC REHAB | Age: 66
Setting detail: THERAPIES SERIES
End: 2021-06-14
Payer: MEDICARE

## 2021-06-16 ENCOUNTER — HOSPITAL ENCOUNTER (OUTPATIENT)
Dept: CARDIAC REHAB | Age: 66
Setting detail: THERAPIES SERIES
Discharge: HOME | End: 2021-06-16
Payer: MEDICARE

## 2021-06-16 PROCEDURE — 93798 PHYS/QHP OP CAR RHAB W/ECG: CPT

## 2021-06-18 ENCOUNTER — APPOINTMENT (OUTPATIENT)
Dept: CARDIAC REHAB | Age: 66
End: 2021-06-18
Payer: MEDICARE

## 2021-06-21 ENCOUNTER — HOSPITAL ENCOUNTER (OUTPATIENT)
Dept: CARDIAC REHAB | Age: 66
Setting detail: THERAPIES SERIES
Discharge: HOME | End: 2021-06-21
Payer: MEDICARE

## 2021-06-21 DIAGNOSIS — J43.9 PULMONARY EMPHYSEMA, UNSPECIFIED EMPHYSEMA TYPE (HCC): ICD-10-CM

## 2021-06-21 DIAGNOSIS — Z95.5 PRESENCE OF STENT IN CORONARY ARTERY IN PATIENT WITH CORONARY ARTERY DISEASE: ICD-10-CM

## 2021-06-21 DIAGNOSIS — I25.10 PRESENCE OF STENT IN CORONARY ARTERY IN PATIENT WITH CORONARY ARTERY DISEASE: ICD-10-CM

## 2021-06-21 LAB
BASOPHILS ABSOLUTE: 0.06 E9/L (ref 0–0.2)
BASOPHILS RELATIVE PERCENT: 0.9 % (ref 0–2)
EOSINOPHILS ABSOLUTE: 0.26 E9/L (ref 0.05–0.5)
EOSINOPHILS RELATIVE PERCENT: 3.8 % (ref 0–6)
HCT VFR BLD CALC: 41.7 % (ref 37–54)
HEMOGLOBIN: 12.9 G/DL (ref 12.5–16.5)
IMMATURE GRANULOCYTES #: 0.02 E9/L
IMMATURE GRANULOCYTES %: 0.3 % (ref 0–5)
IMMATURE RETIC FRACT: 13.4 % (ref 2.3–13.4)
LYMPHOCYTES ABSOLUTE: 1.27 E9/L (ref 1.5–4)
LYMPHOCYTES RELATIVE PERCENT: 18.4 % (ref 20–42)
MCH RBC QN AUTO: 26.5 PG (ref 26–35)
MCHC RBC AUTO-ENTMCNC: 30.9 % (ref 32–34.5)
MCV RBC AUTO: 85.6 FL (ref 80–99.9)
MONOCYTES ABSOLUTE: 1.08 E9/L (ref 0.1–0.95)
MONOCYTES RELATIVE PERCENT: 15.6 % (ref 2–12)
NEUTROPHILS ABSOLUTE: 4.22 E9/L (ref 1.8–7.3)
NEUTROPHILS RELATIVE PERCENT: 61 % (ref 43–80)
PDW BLD-RTO: 17.3 FL (ref 11.5–15)
PLATELET # BLD: 257 E9/L (ref 130–450)
PMV BLD AUTO: 9.7 FL (ref 7–12)
RBC # BLD: 4.87 E12/L (ref 3.8–5.8)
RETIC HGB EQUIVALENT: 33.5 PG (ref 28.2–36.6)
RETICULOCYTE ABSOLUTE COUNT: 0.06 E12/L
RETICULOCYTE COUNT PCT: 1.2 % (ref 0.4–1.9)
WBC # BLD: 6.9 E9/L (ref 4.5–11.5)

## 2021-06-21 PROCEDURE — 93798 PHYS/QHP OP CAR RHAB W/ECG: CPT

## 2021-06-22 ENCOUNTER — OFFICE VISIT (OUTPATIENT)
Dept: PRIMARY CARE CLINIC | Age: 66
End: 2021-06-22
Payer: MEDICARE

## 2021-06-22 VITALS
SYSTOLIC BLOOD PRESSURE: 110 MMHG | HEART RATE: 61 BPM | DIASTOLIC BLOOD PRESSURE: 54 MMHG | OXYGEN SATURATION: 99 % | WEIGHT: 179.2 LBS | HEIGHT: 70 IN | TEMPERATURE: 98.1 F | BODY MASS INDEX: 25.65 KG/M2

## 2021-06-22 DIAGNOSIS — J43.8 OTHER EMPHYSEMA (HCC): ICD-10-CM

## 2021-06-22 DIAGNOSIS — I25.10 CAD IN NATIVE ARTERY: ICD-10-CM

## 2021-06-22 DIAGNOSIS — S40.022A HEMATOMA OF ARM, LEFT, INITIAL ENCOUNTER: Primary | ICD-10-CM

## 2021-06-22 DIAGNOSIS — L03.114 CELLULITIS OF ARM, LEFT: ICD-10-CM

## 2021-06-22 LAB
ALBUMIN SERPL-MCNC: 4.5 G/DL (ref 3.5–5.2)
ALP BLD-CCNC: 50 U/L (ref 40–129)
ALT SERPL-CCNC: 37 U/L (ref 0–40)
ANION GAP SERPL CALCULATED.3IONS-SCNC: 11 MMOL/L (ref 7–16)
AST SERPL-CCNC: 50 U/L (ref 0–39)
BILIRUB SERPL-MCNC: 0.8 MG/DL (ref 0–1.2)
BUN BLDV-MCNC: 14 MG/DL (ref 6–23)
CALCIUM SERPL-MCNC: 9.7 MG/DL (ref 8.6–10.2)
CHLORIDE BLD-SCNC: 104 MMOL/L (ref 98–107)
CO2: 24 MMOL/L (ref 22–29)
CREAT SERPL-MCNC: 0.8 MG/DL (ref 0.7–1.2)
GFR AFRICAN AMERICAN: >60
GFR NON-AFRICAN AMERICAN: >60 ML/MIN/1.73
GLUCOSE BLD-MCNC: 91 MG/DL (ref 74–99)
IRON SATURATION: 43 % (ref 20–55)
IRON: 125 MCG/DL (ref 59–158)
POTASSIUM SERPL-SCNC: 4.2 MMOL/L (ref 3.5–5)
SODIUM BLD-SCNC: 139 MMOL/L (ref 132–146)
TOTAL IRON BINDING CAPACITY: 293 MCG/DL (ref 250–450)
TOTAL PROTEIN: 7.1 G/DL (ref 6.4–8.3)

## 2021-06-22 PROCEDURE — 99213 OFFICE O/P EST LOW 20 MIN: CPT | Performed by: INTERNAL MEDICINE

## 2021-06-22 RX ORDER — CLINDAMYCIN PHOSPHATE 10 MG/G
GEL TOPICAL
Qty: 45 G | Refills: 0 | Status: SHIPPED | OUTPATIENT
Start: 2021-06-22 | End: 2021-06-29

## 2021-06-22 NOTE — PROGRESS NOTES
Chief Complaint   Patient presents with    Other     f/u visit with labs    Other     injury to left lower arm . hit by limb causing abrasion and hematoma. using antibiotic creams. HPI:  Patient is here for follow-up . Lab work showed hemoglobin 12.9 iron studies within normal limits. Patient feels his condition unchanged he is going to see pulmonology in July. 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 Patient also complaining of left elbow bruise swelling and skin ulceration as a result of falling a tree branch on his left elbow a week ago. He applied triple antibiotics on it. Past Medical History, Surgical History, and Family History has been reviewed and updated.     Review of Systems:  Constitutional:  No fever, no fatigue, no chills, no headaches, no weight change  Dermatology:  No rash, no mole, no dry or sensitive skin  ENT:  No cough, no sore throat, no sinus pain, no runny nose, no ear pain  Cardiology:  No chest pain, no palpitations, no leg edema, no shortness of breath, no PND  Gastroenterology:  No dysphagia, no abdominal pain, no nausea, no vomiting, no constipation, no diarrhea, no heartburn  Musculoskeletal:  No joint pain, no leg cramps, no back pain, no muscle aches  Respiratory:  No shortness of breath, no orthopnea, no wheezing, no OJEDA, no hemoptysis  Urology:  No blood in the urine, no urinary frequency, no urinary incontinence, no urinary urgency, no nocturia, no dysuria    Vitals:    06/22/21 1023   BP: (!) 110/54   Site: Right Upper Arm   Position: Sitting   Cuff Size: Large Adult   Pulse: 61   Temp: 98.1 °F (36.7 °C)   SpO2: 99%   Weight: 179 lb 3.2 oz (81.3 kg)   Height: 5' 10\" (1.778 m)       General:  Patient alert and oriented x 3, NAD, pleasant  HEENT:  Atraumatic, normocephalic, PERRLA, EOMI, clear conjunctiva, TMs clear, nose-clear, throat - no erythema  Neck:  Supple, no goiter, no carotid bruits, no LAD  Lungs:  CTA   Heart:  RRR, no murmurs, gallops or rubs  Abdomen:  Soft/nt/nd, + bowel sounds  Extremities:  No clubbing, cyanosis or edema. Left elbow large hematoma 3 x 7 inches with skin tear erythematous Oozing serous fluid.   Skin: unremarkable    Hemoglobin A1C   Date Value Ref Range Status   12/23/2020 5.4 4.0 - 5.6 % Final     Cholesterol, Total   Date Value Ref Range Status   12/23/2020 141 0 - 199 mg/dL Final     Triglycerides   Date Value Ref Range Status   12/23/2020 61 0 - 149 mg/dL Final     HDL   Date Value Ref Range Status   05/17/2021 44 >40 mg/dL Final     LDL Calculated   Date Value Ref Range Status   05/17/2021 47 0 - 99 mg/dL Final     VLDL Cholesterol Calculated   Date Value Ref Range Status   05/17/2021 8 mg/dL Final     Sodium   Date Value Ref Range Status   06/21/2021 139 132 - 146 mmol/L Final     Potassium   Date Value Ref Range Status   06/21/2021 4.2 3.5 - 5.0 mmol/L Final     Chloride   Date Value Ref Range Status   06/21/2021 104 98 - 107 mmol/L Final     CO2   Date Value Ref Range Status   06/21/2021 24 22 - 29 mmol/L Final     BUN   Date Value Ref Range Status   06/21/2021 14 6 - 23 mg/dL Final     CREATININE   Date Value Ref Range Status   06/21/2021 0.8 0.7 - 1.2 mg/dL Final     Glucose   Date Value Ref Range Status   06/21/2021 91 74 - 99 mg/dL Final     Calcium   Date Value Ref Range Status   06/21/2021 9.7 8.6 - 10.2 mg/dL Final     Total Protein   Date Value Ref Range Status   06/21/2021 7.1 6.4 - 8.3 g/dL Final     Albumin   Date Value Ref Range Status   06/21/2021 4.5 3.5 - 5.2 g/dL Final     Total Bilirubin   Date Value Ref Range Status   06/21/2021 0.8 0.0 - 1.2 mg/dL Final     Alkaline Phosphatase   Date Value Ref Range Status   06/21/2021 50 40 - 129 U/L Final     AST   Date Value Ref Range Status   06/21/2021 50 (H) 0 - 39 U/L Final     ALT   Date Value Ref Range Status   06/21/2021 37 0 - 40 U/L Final     GFR Non-   Date Value Ref Range

## 2021-06-22 NOTE — PATIENT INSTRUCTIONS
Patient was advised to take all prescribed medications and inhaler  Apply clindamycin gel to the left elbow skin injury twice a day  Follow-up in 2 weeks

## 2021-06-25 ENCOUNTER — HOSPITAL ENCOUNTER (OUTPATIENT)
Dept: CARDIAC REHAB | Age: 66
Setting detail: THERAPIES SERIES
Discharge: HOME | End: 2021-06-25
Payer: MEDICARE

## 2021-06-25 PROCEDURE — 93798 PHYS/QHP OP CAR RHAB W/ECG: CPT

## 2021-06-28 ENCOUNTER — HOSPITAL ENCOUNTER (OUTPATIENT)
Dept: CARDIAC REHAB | Age: 66
Setting detail: THERAPIES SERIES
Discharge: HOME | End: 2021-06-28
Payer: MEDICARE

## 2021-06-28 PROCEDURE — 93798 PHYS/QHP OP CAR RHAB W/ECG: CPT

## 2021-06-30 ENCOUNTER — HOSPITAL ENCOUNTER (OUTPATIENT)
Dept: CARDIAC REHAB | Age: 66
Setting detail: THERAPIES SERIES
Discharge: HOME | End: 2021-06-30
Payer: MEDICARE

## 2021-06-30 PROCEDURE — 93798 PHYS/QHP OP CAR RHAB W/ECG: CPT

## 2021-07-02 ENCOUNTER — HOSPITAL ENCOUNTER (OUTPATIENT)
Dept: CARDIAC REHAB | Age: 66
Setting detail: THERAPIES SERIES
Discharge: HOME | End: 2021-07-02
Payer: MEDICARE

## 2021-07-02 PROCEDURE — 93798 PHYS/QHP OP CAR RHAB W/ECG: CPT

## 2021-07-06 ENCOUNTER — OFFICE VISIT (OUTPATIENT)
Dept: PRIMARY CARE CLINIC | Age: 66
End: 2021-07-06
Payer: MEDICARE

## 2021-07-06 VITALS
HEIGHT: 70 IN | BODY MASS INDEX: 25.7 KG/M2 | TEMPERATURE: 97.4 F | HEART RATE: 62 BPM | WEIGHT: 179.5 LBS | DIASTOLIC BLOOD PRESSURE: 64 MMHG | OXYGEN SATURATION: 97 % | SYSTOLIC BLOOD PRESSURE: 122 MMHG

## 2021-07-06 DIAGNOSIS — L03.114 CELLULITIS OF LEFT ARM: Primary | ICD-10-CM

## 2021-07-06 DIAGNOSIS — I25.10 CORONARY ARTERY DISEASE DUE TO LIPID RICH PLAQUE: ICD-10-CM

## 2021-07-06 DIAGNOSIS — S50.12XD TRAUMATIC HEMATOMA OF LEFT FOREARM, SUBSEQUENT ENCOUNTER: ICD-10-CM

## 2021-07-06 DIAGNOSIS — J42 CHRONIC BRONCHITIS, UNSPECIFIED CHRONIC BRONCHITIS TYPE (HCC): ICD-10-CM

## 2021-07-06 DIAGNOSIS — I25.83 CORONARY ARTERY DISEASE DUE TO LIPID RICH PLAQUE: ICD-10-CM

## 2021-07-06 PROCEDURE — 99213 OFFICE O/P EST LOW 20 MIN: CPT | Performed by: INTERNAL MEDICINE

## 2021-07-06 SDOH — ECONOMIC STABILITY: FOOD INSECURITY: WITHIN THE PAST 12 MONTHS, THE FOOD YOU BOUGHT JUST DIDN'T LAST AND YOU DIDN'T HAVE MONEY TO GET MORE.: NEVER TRUE

## 2021-07-06 SDOH — ECONOMIC STABILITY: FOOD INSECURITY: WITHIN THE PAST 12 MONTHS, YOU WORRIED THAT YOUR FOOD WOULD RUN OUT BEFORE YOU GOT MONEY TO BUY MORE.: NEVER TRUE

## 2021-07-06 ASSESSMENT — SOCIAL DETERMINANTS OF HEALTH (SDOH): HOW HARD IS IT FOR YOU TO PAY FOR THE VERY BASICS LIKE FOOD, HOUSING, MEDICAL CARE, AND HEATING?: SOMEWHAT HARD

## 2021-07-06 NOTE — PROGRESS NOTES
Chief Complaint   Patient presents with    Other     f/u visit from 6/22  no labs or tests since last visit. HPI:  Patient is here for follow-up. Left forearm swelling subsided redness and skin tear healing no pain no drainage able to move his left elbow well. No fever no chills. Compliant to medications. No dyspnea at rest or mild exertion no cough    Past Medical History, Surgical History, and Family History has been reviewed and updated. Review of Systems:  Constitutional:  No fever, no fatigue, no chills, no headaches, no weight change  Dermatology:  No rash, no mole, no dry or sensitive skin  ENT:  No cough, no sore throat, no sinus pain, no runny nose, no ear pain  Cardiology:  No chest pain, no palpitations, no leg edema, no shortness of breath, no PND  Gastroenterology:  No dysphagia, no abdominal pain, no nausea, no vomiting, no constipation, no diarrhea, no heartburn  Musculoskeletal:  No joint pain, no leg cramps, no back pain, no muscle aches  Respiratory:  No shortness of breath, no orthopnea, no wheezing, no OJEDA, no hemoptysis  Urology:  No blood in the urine, no urinary frequency, no urinary incontinence, no urinary urgency, no nocturia, no dysuria    Vitals:    07/06/21 1048   BP: 122/64   Site: Right Upper Arm   Position: Sitting   Cuff Size: Large Adult   Pulse: 62   Temp: 97.4 °F (36.3 °C)   SpO2: 97%   Weight: 179 lb 8 oz (81.4 kg)   Height: 5' 10\" (1.778 m)       General:  Patient alert and oriented x 3, NAD, pleasant  HEENT:  Atraumatic, normocephalic, PERRLA, EOMI, clear conjunctiva, TMs clear, nose-clear, throat - no erythema  Neck:  Supple, no goiter, no carotid bruits, no LAD  Lungs:  CTA   Heart:  RRR, no murmurs, gallops or rubs  Abdomen:  Soft/nt/nd, + bowel sounds  Extremities:  No clubbing, cyanosis or edema. Forearm skin tear erythema healing no drainage no tenderness still slightly warm to touch.   Hematoma resolving no limitation of left elbow movement left wrist movement. Skin: unremarkable    Hemoglobin A1C   Date Value Ref Range Status   12/23/2020 5.4 4.0 - 5.6 % Final     Cholesterol, Total   Date Value Ref Range Status   12/23/2020 141 0 - 199 mg/dL Final     Triglycerides   Date Value Ref Range Status   12/23/2020 61 0 - 149 mg/dL Final     HDL   Date Value Ref Range Status   05/17/2021 44 >40 mg/dL Final     LDL Calculated   Date Value Ref Range Status   05/17/2021 47 0 - 99 mg/dL Final     VLDL Cholesterol Calculated   Date Value Ref Range Status   05/17/2021 8 mg/dL Final     Sodium   Date Value Ref Range Status   06/21/2021 139 132 - 146 mmol/L Final     Potassium   Date Value Ref Range Status   06/21/2021 4.2 3.5 - 5.0 mmol/L Final     Chloride   Date Value Ref Range Status   06/21/2021 104 98 - 107 mmol/L Final     CO2   Date Value Ref Range Status   06/21/2021 24 22 - 29 mmol/L Final     BUN   Date Value Ref Range Status   06/21/2021 14 6 - 23 mg/dL Final     CREATININE   Date Value Ref Range Status   06/21/2021 0.8 0.7 - 1.2 mg/dL Final     Glucose   Date Value Ref Range Status   06/21/2021 91 74 - 99 mg/dL Final     Calcium   Date Value Ref Range Status   06/21/2021 9.7 8.6 - 10.2 mg/dL Final     Total Protein   Date Value Ref Range Status   06/21/2021 7.1 6.4 - 8.3 g/dL Final     Albumin   Date Value Ref Range Status   06/21/2021 4.5 3.5 - 5.2 g/dL Final     Total Bilirubin   Date Value Ref Range Status   06/21/2021 0.8 0.0 - 1.2 mg/dL Final     Alkaline Phosphatase   Date Value Ref Range Status   06/21/2021 50 40 - 129 U/L Final     AST   Date Value Ref Range Status   06/21/2021 50 (H) 0 - 39 U/L Final     ALT   Date Value Ref Range Status   06/21/2021 37 0 - 40 U/L Final     GFR Non-   Date Value Ref Range Status   06/21/2021 >60 >=60 mL/min/1.73 Final     Comment:     Chronic Kidney Disease: less than 60 ml/min/1.73 sq.m. Kidney Failure: less than 15 ml/min/1.73 sq.m. Results valid for patients 18 years and older.        GFR    Date Value Ref Range Status   06/21/2021 >60  Final        No results found. Assessment/Plan: Left forearm cellulitis healing well                                   Left forearm hematoma resolving                                   COPD stable                                   CAD stable    Outpatient Encounter Medications as of 7/6/2021   Medication Sig Dispense Refill    albuterol sulfate  (90 Base) MCG/ACT inhaler       ticagrelor (BRILINTA) 90 MG TABS tablet Take 1 tablet by mouth 2 times daily 180 tablet 3    rosuvastatin (CRESTOR) 10 MG tablet Take 1 tablet by mouth daily 90 tablet 0    TRELEGY ELLIPTA 100-62.5-25 MCG/INH AEPB inhale 1 puff by mouth and INTO THE LUNGS once daily 3 each 0    aspirin 81 MG chewable tablet Take 1 tablet by mouth daily 90 tablet 3    latanoprost (XALATAN) 0.005 % ophthalmic solution instill 1 drop into both eyes at bedtime      albuterol (PROVENTIL) (2.5 MG/3ML) 0.083% nebulizer solution       Arginine 500 MG CAPS Take 2 capsules by mouth 3 times daily       Omega-3 Fatty Acids (FISH OIL) 1200 MG CAPS Take by mouth 2 times daily      Multiple Vitamins-Minerals (THERAPEUTIC MULTIVITAMIN-MINERALS) tablet Take 1 tablet by mouth 2 times daily       No facility-administered encounter medications on file as of 7/6/2021. Jovani Manrique was seen today for other.     Diagnoses and all orders for this visit:    Cellulitis of left arm    Traumatic hematoma of left forearm, subsequent encounter    Coronary artery disease due to lipid rich plaque    Chronic bronchitis, unspecified chronic bronchitis type [facility])         Patient Instructions   Patient was advised to continue applying topical clindamycin gel to the left forearm affected area twice a day for the next 3 days then discontinue  Continue all present medications  Follow-up in 3 months             Luke Shipley MD   7/6/21

## 2021-07-07 ENCOUNTER — HOSPITAL ENCOUNTER (OUTPATIENT)
Dept: CARDIAC REHAB | Age: 66
Setting detail: THERAPIES SERIES
Discharge: HOME | End: 2021-07-07
Payer: MEDICARE

## 2021-07-07 PROCEDURE — 93798 PHYS/QHP OP CAR RHAB W/ECG: CPT

## 2021-07-12 NOTE — PROGRESS NOTES
[address] [facility]  [phone]    Upon completion of your initial Phase II portion of cardiopulmonary rehabilitation, you will need to continue to exercise to maintain your present level of fitness obtained during Phase II to prevent any complications that may occur with lack of activity. This home exercise program is designed to help you achieve your maximum fitness level based on your accomplishments in Phase II. Following are some guidelines to help you exercise safely and effectively. Make exercise part of your daily routine  Do not exercise if you are not feeling well  Wear comfortable shoes and clothes made for exercise  Do not engage in vigorous activities for at least one hour after a heavy meal  Do not drink alcoholic or caffeinated beverages two hours before exercising  Avoid smoking one hour before and right after exercising, try to quit ASAP  Plan to exercise in a climate-controlled environment. Do not exercise outside if it's hotter than 80F, colder than 30F or greater than 75% humidity, and/or very windy  After exercise, take a brief warm shower. Avoid extremely hot or cold water  When exercising outdoors consider:  Letting someone know that you are exercising outdoors. If possible, exercise with someone  Have a plan of action of what to do if you do not feel well during exercise  Carry your identification; especially if you are exercising alone  Wear colors that are easily seen  When exercising in cold weather:  Dress to maintain body heat, but avoid overheating  Dress in layers  Wear a hat and gloves to prevent heat loss  A mask or scarf over your nose and mouth will warm the air as you breathe. Also, inhale through your nose  During your exercise program, if you experience any of the following symptoms STOP!!!  Chest tightness, discomfort, or pain.  If chest pain persists after two minutes, take nitroglycerine pills if prescribed as instructed, make sure you are seated. Light headedness or dizziness. Wait a maximum of two minutes and if there is no relief or an increase in symptoms, STOP and contact your physician IMMEDIATELY! Unusual shortness of breath  Nausea or vomiting   Leg pain or weakness  Palpitations  Irregular heartbeat  Pale, cool, clammy skin    Pulmonary Patients:   Make sure to use your inhaler before exercise and remember to carry your inhaler with you always. Remember to check your oxygen tank before exercising to be sure that you have enough. If you have pulse ox monitor take it with you as well. To get the most out of your home exercise program, you will need to follow the recommended guidelines set up by the staff at the [place] [id number]. These guidelines are based on your present conditions. Based on your age, medication and ejection fraction (% of blood pumped from the heart), your personal target heart rate range is  bpm.   (this is what you have been working between -- this is working at your [employer] under [employer] of [address]). Frequency: According to ACSM guidelines, it is recommended to exercise most days of the week, maintaining 150 minutes or more of low to moderate intensity. This can be done in combination of aerobic (exercise increasing the cardiovascular system by exchanging oxygen) and dynamic (exercise involving movement of the joints to increase strength and stability).        Intensity:  Low                                               Moderate                                                       High      Modalities Level METs   Arm ergometer 2 2.3   Nu-Step 7 3.1   Recumbent Bike 2 41 LENNON   Rower 7 5.1   Treadmill 2.7 mph @ 4.5% grade 9.4                Heart Rate Conversions     10 second count Beats/Minute 10 Second Count Beats/Minute   10 60 21 126    1 66 22 132   12 72 23 138   13 78 24 144   14 84 25 150   15 90 26 156   16 96 27 162   17 102 28 168   18 108 29 174   19 114 30 180   20 120 31 186       Rate of Perceived Dyspnea   0 Nothing at all    .5 Extremely Slight   1 Very Slight   2 Slight   3 Moderate    4 Somewhat Severe   5 Severe   6    7 Very Severe   8    9 Extremely Severe    10 Maximal        Juan Alberto's Rate of Perceived Exertion   6 No Exertion   7 Extremely Light   8    9 Very Light   10    11 Light   12    13 Somewhat Hard   14    15 Hard (Heavy)   16    17 Very Hard   18    19 Extremely Hard   20 Maximal Exertion                          **RPD scale or rate of perceived dyspnea scale is a tool that can be used to determine how breathless you may feel while at rest or during exertion. It is common for your breathing to get heavier with exercise. Your RPD should not exceed a 5 while exercising. **  ** RPE scale or rate of perceived exertion is a tool that can be used to determine how hard you are exerting yourself during daily activities or working out. You should take caution when you feel that you are working at a 12 or higher and you should never work at a rate of 17 or higher.  **             Approximate Energy Requirements of Selected Activities  Category Self or Home Care Occupational Recreational Physical Conditioning   VERY LIGHT  < 3 METS  <10mL/kg/min  <4kcals    1 MET= sitting and doing nothing Washing, shaving, dressing  Washing dishes  Driving automobile  Dusting, ironing  Shopping  Using hand tools  Kneading dough  Riding power mower    Sitting (clerical, assembling)  Standing (store clerking, tending bar)  Repairing radios and TVs  Desk work   Operating cranes Shuffleboard, darts  Horseshoes  Bait casting  Billiards, bowling  Archery, skeet  Golf (cart)  Piano playing  Card playing  Horseback riding (walk)  Kayaking, rowing, canoeing (2.5 mph)   Walking (level at 2 mph)  Bicycling (5 mph)  Very light calisthenics  Range of motion exercising    LIGHT  3-5 METs   11-18mL/kg/min  4-6kcals Cleaning windows  Raking leaves  Weeding  Waxing floors (slowly)  Painting  Carrying objects (15-30 lbs)  Gardening, hoeing  Power saw  Pushing  Stocking shelves (light to medium weight)  Moderate walking   Light carpentry  Machine assembly   Auto repair Plastering/drywall  Putting in a sidewalk  Hitching trailers  [address] work  [place]  Interior  Repair/remodeling  [address] trailer truck [address] (social)  Golfing (walking)  Sailing  Horseback riding (trot)  Volleyball (6 person)  Archery  Table tennis  [place] (noncompetitive)  Ice boating  Kayaking, rowing, canoeing (3 mph) Walking (3-4 mph)  Bicycling (5 mph)  Light calisthenics   Light swimming  Stair climbing (moderate)  Moderate calisthenics    MODERATE  5-7 METs  18-25mL/kg/min  6-8kcals Easy digging in garden  Push mowing (level)   Carrying objects (30-60lbs)  Splitting wood Carpentry (exterior home building)  Shoveling dirt  Using pneumatic tools Badminton (competitive)  Tennis (singles)   Downhill skiing (light)  Backpacking (5 lbs)  Ice or roller skating  Horseback riding (post trot)  Softball (competitive)  Cross-country hiking   [employer], rowing, canoeing (4 mph) Walking (4-5 mph)  Bicycling (9-10 mph)  Swimming (breast stroke)   HEAVY-Not Recommended  7-9 METs  25-32 mL/kg/min  8-10kcals Sawing wood  Heavy shoveling   Climbing a ladder  Carrying heavy objects (60-90lbs)  Carrying 20lbs upstairs Hand sawing hardwood  Tending furnace  Digging ditches  Using pick and shovel  Using sledge hammer  Moving and pushing desks and filing cabinets  Laying railroad track [employer] climbing   [employer] riding (gallop)  Snow shoeing (3mph)  Sledding, tobogganing  Ice hockey  Paddle ball  Touch football  [place], rowing, canoeing (5mph)  Basketball (noncompetitive) Downhill skiing (vigorous)  Cross-country skiing (4 mph) Heavy calisthenics   Jogging (5mph)  Swimming (crawl)  Using rower machine  Walking (6mph)  Bicycling (11-12mph)  Stair climbing (fast)   VERY HEAVY-Not Recommended  >9 METs  >32 mL/kg/min  >10kcals Carrying Objects (>90lb)  Shoveling heavy snow  Shoveling 16lbs x10/min Logging   Heavy labor   Handball  Squash  Cross-country skiing  Basketball (competitive) Running (>6mph)  Bicycling (>13mph) or up a steep hill  Jumping rope

## 2021-08-19 DIAGNOSIS — E78.2 MIXED HYPERLIPIDEMIA: ICD-10-CM

## 2021-08-19 DIAGNOSIS — J43.9 PULMONARY EMPHYSEMA, UNSPECIFIED EMPHYSEMA TYPE (HCC): ICD-10-CM

## 2021-08-19 RX ORDER — ROSUVASTATIN CALCIUM 10 MG/1
10 TABLET, COATED ORAL DAILY
Qty: 90 TABLET | Refills: 0 | Status: SHIPPED
Start: 2021-08-19 | End: 2021-12-02 | Stop reason: SDUPTHER

## 2021-08-19 RX ORDER — FLUTICASONE FUROATE, UMECLIDINIUM BROMIDE AND VILANTEROL TRIFENATATE 100; 62.5; 25 UG/1; UG/1; UG/1
POWDER RESPIRATORY (INHALATION)
Qty: 3 EACH | Refills: 0 | Status: SHIPPED
Start: 2021-08-19 | End: 2021-12-02

## 2021-08-19 NOTE — TELEPHONE ENCOUNTER
----- Message from Ray Lee MA sent at 8/19/2021  9:17 AM EDT -----  Subject: Refill Request    QUESTIONS  Name of Medication? Aneta Varela 100-62.5-25 MCG/INH AEPB  Patient-reported dosage and instructions? 1 puff once daily  How many days do you have left? 0  Preferred Pharmacy? [address] phone number (if available)? [phone]  ---------------------------------------------------------------------------  --------------,  Name of Medication? rosuvastatin (CRESTOR) 10 MG tablet  Patient-reported dosage and instructions? once daily  How many days do you have left? 0  Preferred Pharmacy? [id number]  Pharmacy phone number (if available)? [phone]  ---------------------------------------------------------------------------  --------------  CALL BACK INFO  What is the best way for the office to contact you? OK to leave message on   voicemail, OK to respond with electronic message via [employer] portal (only   for patients who have registered [employer] account)  Preferred Call Back Phone Number?  [phone]

## 2021-08-27 ENCOUNTER — OFFICE VISIT (OUTPATIENT)
Dept: CARDIOLOGY CLINIC | Age: 66
End: 2021-08-27
Payer: MEDICARE

## 2021-08-27 VITALS
BODY MASS INDEX: 26.2 KG/M2 | SYSTOLIC BLOOD PRESSURE: 130 MMHG | DIASTOLIC BLOOD PRESSURE: 58 MMHG | HEIGHT: 70 IN | HEART RATE: 58 BPM | WEIGHT: 183 LBS

## 2021-08-27 DIAGNOSIS — I10 ESSENTIAL HYPERTENSION: Primary | ICD-10-CM

## 2021-08-27 PROCEDURE — 93000 ELECTROCARDIOGRAM COMPLETE: CPT | Performed by: INTERNAL MEDICINE

## 2021-08-27 PROCEDURE — 99214 OFFICE O/P EST MOD 30 MIN: CPT | Performed by: INTERNAL MEDICINE

## 2021-08-27 RX ORDER — CLOPIDOGREL BISULFATE 75 MG/1
75 TABLET ORAL DAILY
Qty: 90 TABLET | Refills: 3 | Status: SHIPPED
Start: 2021-08-27 | End: 2022-05-10

## 2021-08-27 NOTE — PROGRESS NOTES
[facility] Cardiology Progress Note  Dr. Jonnathan Mckeon      Referring Physician: Jun Jose MD  CHIEF COMPLAINT:   Chief Complaint   Patient presents with    Hypertension     6 MONTH CHECK. SOB at anytime. Patient is going to see a Pulmonary physician. HISTORY OF PRESENT ILLNESS:   Patient is 72year old male CAD s/p PCI to LAD in December 2020, is here for follow-up appointment. Occasional sharp chest pain that comes and goes, shortness of breath is at baseline, denies any palpitations, no pedal edema, no PND, no orthopnea, no syncope, no presyncopal episodes       Past Medical History:   Diagnosis Date    Arthritis     Asthma     Breathing difficulty     CAD (coronary artery disease)     COPD (chronic obstructive pulmonary disease) ([facility] [address]     Non-rheumatic tricuspid valve insufficiency     Nonrheumatic mitral (valve) insufficiency          Past Surgical History:   Procedure Laterality Date    APPENDECTOMY      COLONOSCOPY      CORONARY ANGIOPLASTY WITH STENT PLACEMENT  12/22/2020    DR. Mandel Resolute Christopher DUANE 2.25x18 Mid LAD, Resolute Christopher DUANE 2.5x18 Mid Cx.   EF 65%    HERNIA REPAIR      INTRACAPSULAR CATARACT EXTRACTION Left 4/6/2021    LEFT   CATARACT EXTRACTION  IOL IMPLANT I STENT performed by Daina Jules MD at [place] [id number]      right wrist carpal tunnel release, right long trigger finger release    OTHER SURGICAL HISTORY Right 01/20/2016    right shoulder arthroscopy acromioplasty with decompression rotator cuff repair     OTHER SURGICAL HISTORY Left 04/06/2021    LEFT EYE CATERACT EXTRACTION WITH INTRA OCULAR IMPLANT WITH I STENT    SHOULDER ARTHROSCOPY  3/4/11    left rotator cuff repair         Current Outpatient Medications   Medication Sig Dispense Refill    clopidogrel (PLAVIX) 75 MG tablet Take 1 tablet by mouth daily 90 tablet 3    rosuvastatin (CRESTOR) 10 MG tablet Take 1 tablet by mouth daily 90 tablet 0    TRELEGY ELLIPTA 100-62.5-25 MCG/INH AEPB inhale 1 puff by mouth and INTO THE LUNGS once daily 3 each 0    albuterol sulfate  (90 Base) MCG/ACT inhaler       aspirin 81 MG chewable tablet Take 1 tablet by mouth daily 90 tablet 3    latanoprost (XALATAN) 0.005 % ophthalmic solution instill 1 drop into both eyes at bedtime      albuterol (PROVENTIL) (2.5 MG/3ML) 0.083% nebulizer solution       Arginine 500 MG CAPS Take 2 capsules by mouth 3 times daily       Omega-3 Fatty Acids (FISH OIL) 1200 MG CAPS Take by mouth 2 times daily      Multiple Vitamins-Minerals (THERAPEUTIC MULTIVITAMIN-MINERALS) tablet Take 1 tablet by mouth 2 times daily       No current facility-administered medications for this visit. Allergies as of 2021    (No Known Allergies)       Social History     Socioeconomic History    Marital status:      Spouse name: Not on file    Number of children: Not on file    Years of education: Not on file    Highest education level: Not on file   Occupational History    Not on file   Tobacco Use    Smoking status: Former Smoker     Packs/day: 1.50     Years: 40.00     Pack years: 60.00     Types: Cigarettes     Start date:      Quit date:      Years since quittin.6    Smokeless tobacco: Never Used   Vaping Use    Vaping Use: Never used   Substance and Sexual Activity    Alcohol use: No     Comment: 6 cups a day of coffee    Drug use: No    Sexual activity: Not on file   Other Topics Concern    Not on file   Social History Narrative    Not on file     Social Determinants of Health     Financial Resource Strain: Medium Risk    Difficulty of Paying Living Expenses: Somewhat hard   Food Insecurity: No Food Insecurity    Worried About Running Out of Food in the Last Year: Never true    [address] in the Last Year: Never true   Transportation Needs:     Lack of Transportation (Medical):      Lack of Transportation (Non-Medical):    Physical Activity:     Days of Exercise per Week:     Minutes of Exercise per Session:    Stress:     Feeling of Stress :    Social Connections:     Frequency of Communication with Friends and Family:     Frequency of Social Gatherings with Friends and Family:     Attends [employer] Services:     Active Member of Clubs or Organizations:     Attends Club or Organization Meetings:     Marital Status:    Intimate Partner Violence:     Fear of Current or Ex-Partner:     Emotionally Abused:     Physically Abused:     Sexually Abused:        Family History   Problem Relation Age of Onset    Heart Disease Father     Heart Attack Father     High Blood Pressure Father     High Cholesterol Father     Diabetes Father     Arrhythmia Sister        REVIEW OF SYSTEMS:     CONSTITUTIONAL:  negative for  fevers, chills, sweats, + fatigue  HEENT:  negative for  tinnitus, earaches, nasal congestion and epistaxis  RESPIRATORY:  negative for  dry cough, cough with sputum,wheezing and hemoptysis  GASTROINTESTINAL:  negative for nausea, vomiting, diarrhea, constipation, pruritus and jaundice  HEMATOLOGIC/LYMPHATIC:  negative for easy bruising, bleeding, lymphadenopathy and petechiae  ENDOCRINE:  negative for heat intolerance, cold intolerance, tremor, hair loss and diabetic symptoms including neither polyuria nor polydipsia nor blurred vision  MUSCULOSKELETAL:  negative for  myalgias, arthralgias, joint swelling, stiff joints and decreased range of motion  NEUROLOGICAL:  negative for memory problems, speech problems, visual disturbance, dysphagia, weakness and numbness    PHYSICAL EXAM:   Constitutional:  Awake, alert cooperative, no apparent distress, and appears stated age. HEENT:  Moist and pink mucous membranes, normocephalic, without obvious abnormality, atraumatic, normal ears and nose.    NECK:  Supple, symmetrical, trachea midline, no JVD, no adenopathy, thyroid symmetric, not enlarged and no tenderness, good carotid upstroke bilaterally, no carotid bruit, skin normal.   LUNGS: No increased work of breathing, good air exchange, clear to auscultation bilaterally, no crackles or wheezing. Cardiovascular: Normal apical impulse, regular rate and rhythm, normal S1 and S2, no S3 or S4, 2/6 systolic murmur at the apex, 2/6 diastolic murmur at the right upper sternal border, 2/6 systolic murmur at the left lower sternal border, no pedal edema, good carotid upstroke bilaterally, no carotid bruit, no JVD, no abdominal pulsating masses. ABDOMEN: Soft, nontender, no hepatomegaly, no splenomegaly, bowel sound positive. Musculoskeletal:  No clubbing or cyanosis. No redness, warmth, or swelling of the joints. Neurological: Alert, awake, and oriented X3   SKIN: No bruises, no bleeding, normal skin color, texture, turgor and no redness, warmth or swelling. BP (!) 130/58 (Site: Right Upper Arm, Position: Sitting, Cuff Size: Large Adult)   Pulse 58   Ht 5' 10\" (1.778 m)   Wt 183 lb (83 kg)   BMI 26.26 kg/m²     DATA:   I personally reviewed the visit EKG with the following interpretation: Sinus bradycardia, right bundle branch block, normal axis    EKG - 1/14/21 Sinus rhythm, left bundle branch block, no changes when compared to previous    ECHO: 3/12/2019) Normal let ventricular systolic function with normal diastolic function. Mild mitral valve regurgitation. Mild tricuspid valve regurgitation with normal RVSP. Stress Test: 12/17/20 Impression:     1. Exercise EKG was abnormal with Downsloping ST depression in   the inferior leads, horizontal ST depression in the anterolateral   leads, with high predictive value for ischemia.     2. The patient experienced Shortness of breath with some   tightness in the chest with exercise. 3. Gusman treadmill score was -9 implying intermediate risk of   acute ischemic events.     4. Exercise capacity was below average. Angiography: 12/22/20 CONCLUSIONS:  1. Coronary artery disease:  a. Left main:  No significant disease.   b. LAD:  20% eccentric proximal vessel narrowing, 50% mid vessel  disease and 80% to 90% mid to distal vessel stenosis after the large  terminal diagonal branch.  c.  LCX:  70% ostial stenosis of a small to moderate first marginal  branch. No significant disease of the large second marginal branch. 70% stenosis of the mid to distal left circumflex. d.  RCA:  Dominant vessel with around 50% to 60% ostial narrowing of the  small posterolateral branch and around 30% ostial narrowing of the large  posterior descending artery branch. 2.  Normal left ventricular size and systolic function with an estimated  ejection fraction of 65%. 3.  Successful balloon angioplasty with the deployment of drug-eluting  coronary stent to the mid to distal LAD with very good results.   4.  Successful balloon angioplasty with the deployment of drug-eluting  coronary stent to the mid to distal left circumflex with very good  results.       Cardiology Labs: BMP:    Lab Results   Component Value Date     06/21/2021    K 4.2 06/21/2021    K 3.4 12/23/2020     06/21/2021    CO2 24 06/21/2021    BUN 14 06/21/2021    CREATININE 0.8 06/21/2021     CMP:    Lab Results   Component Value Date     06/21/2021    K 4.2 06/21/2021    K 3.4 12/23/2020     06/21/2021    CO2 24 06/21/2021    BUN 14 06/21/2021    CREATININE 0.8 06/21/2021    PROT 7.1 06/21/2021     CBC:    Lab Results   Component Value Date    WBC 6.9 06/21/2021    RBC 4.87 06/21/2021    HGB 12.9 06/21/2021    HCT 41.7 06/21/2021    MCV 85.6 06/21/2021    RDW 17.3 06/21/2021     06/21/2021     PT/INR:  No results found for: PTINR  PT/INR Warfarin:  No components found for: PTPATWAR, PTINRWAR  PTT:  No results found for: APTT  PTT Heparin:  No components found for: APTTHEP  Magnesium:    Lab Results   Component Value Date    MG 2.0 12/23/2020     TSH:  No results found for: TSH  TROPONIN:  No components found for: TROP  BNP:  No results found for: BNP  FASTING LIPID PANEL:    Lab Results   Component Value Date    CHOL 141 12/23/2020    HDL 44 05/17/2021    TRIG 61 12/23/2020     No orders to display     I have personally reviewed the laboratory, cardiac diagnostic and radiographic testing as outlined above:      IMPRESSION:  1: CAD: Patient had cardiac catheterization on 12/22/2020 with the following findings:  # Left main:  No significant disease. # LAD:  20% eccentric proximal vessel narrowing, 50% mid vessel disease and 80% to 90% mid to distal vessel stenosis after the large terminal diagonal branch. # LCX:  70% ostial stenosis of a small to moderate first marginal branch. No significant disease of the large second marginal branch. 70% stenosis of the mid to distal left circumflex. # RCA:  Dominant vessel with around 50% to 60% ostial narrowing of the small posterolateral branch and around 30% ostial narrowing of the large posterior descending artery branch. # Normal left ventricular size and systolic function with an estimated ejection fraction of 65%. # Successful balloon angioplasty with the deployment of drug-eluting coronary stent to the mid to distal LAD with very good results. # Successful balloon angioplasty with the deployment of drug-eluting coronary stent to the mid to distal left circumflex with very good results. Continue current treatment  2: Nonrheumatic mitral (valve) insufficiency: Mild             3:  Nonrheumatic tricuspid (valve) insufficiency: Mild            4: Chronic obstructive pulmonary disease, unspecified              5:  Family history of ischemic heart disease and other diseases: Father had his first heart attack at age 61               RECOMMENDATIONS:   1. Will change Brilinta to Plavix to see if that helps with his shortness of breath  2. Continue the rest of medications Amina  3. Risk of instent thrombosis due to premature discontinuation of either ASA or Plavix discussed with patient at length  4.   Preventive cardiology: Low-salt, low-cholesterol diet, daily exercise, total cholesterol of less than 200, LDL of less than 70, were all advised. 5.  Follow-up with  as scheduled  6. Follow-up with Dr. Leonel San in 3 months, sooner if symptomatic for any reason    I have reviewed my findings and recommendations with patient    Electronically signed by Dola Siemens, MD on 8/27/2021 at 12:28 PM    NOTE: This report was transcribed using voice recognition software.  Every effort was made to ensure accuracy; however, inadvertent computerized transcription errors may be presen

## 2021-09-02 ENCOUNTER — OFFICE VISIT (OUTPATIENT)
Dept: PRIMARY CARE CLINIC | Age: 66
End: 2021-09-02
Payer: MEDICARE

## 2021-09-02 VITALS
TEMPERATURE: 97.7 F | DIASTOLIC BLOOD PRESSURE: 70 MMHG | HEIGHT: 70 IN | SYSTOLIC BLOOD PRESSURE: 140 MMHG | OXYGEN SATURATION: 98 % | HEART RATE: 57 BPM | BODY MASS INDEX: 25.73 KG/M2 | WEIGHT: 179.7 LBS

## 2021-09-02 DIAGNOSIS — I25.10 CAD IN NATIVE ARTERY: Primary | ICD-10-CM

## 2021-09-02 DIAGNOSIS — J43.8 OTHER EMPHYSEMA (HCC): ICD-10-CM

## 2021-09-02 DIAGNOSIS — H53.452: ICD-10-CM

## 2021-09-02 DIAGNOSIS — E78.2 MIXED HYPERLIPIDEMIA: ICD-10-CM

## 2021-09-02 PROCEDURE — 99213 OFFICE O/P EST LOW 20 MIN: CPT | Performed by: INTERNAL MEDICINE

## 2021-09-02 NOTE — PROGRESS NOTES
Chief Complaint   Patient presents with    Hypertension     f/u visit. no labs or tests since last visit. Saw Dr. Eric Catalan and pt reports he stopped brilenta and started him on plavix and told him to stop cholesterol med for 2 weeks.  Hyperlipidemia    Other     states he also saw eye doctor dr. Marino Giordano.  Other     denies any symptoms of covid and has not had vaccine for covid       HPI:  Patient is here for follow-up . Feels well except he complains of an area in the medial side of left thigh field of vision that cannot see very well. He stated that he had cataract surgery did not fix it he wants to see another ophthalmologist.  Patient stated that since [address] was discontinued and he started Plavix that he is no longer tired no shortness of breath. He was also advised to stop statins for 2 weeks to see if that improves his muscular back pain. Patient is unable to afford Trelegy because of cost he has leftover Symbicort inhaler that he is raising    Past Medical History, Surgical History, and Family History has been reviewed and updated.     Review of Systems:  Constitutional:  No fever, no fatigue, no chills, no headaches, no weight change  Dermatology:  No rash, no mole, no dry or sensitive skin  ENT:  No cough, no sore throat, no sinus pain, no runny nose, no ear pain  Cardiology:  No chest pain, no palpitations, no leg edema, no shortness of breath, no PND  Gastroenterology:  No dysphagia, no abdominal pain, no nausea, no vomiting, no constipation, no diarrhea, no heartburn  Musculoskeletal:  No joint pain, no leg cramps, no back pain, no muscle aches  Respiratory:  No shortness of breath, no orthopnea, no wheezing, no OJEDA, no hemoptysis  Urology:  No blood in the urine, no urinary frequency, no urinary incontinence, no urinary urgency, no nocturia, no dysuria    Vitals:    09/02/21 1122   BP: (!) 140/70   Site: Right Upper Arm   Position: Sitting   Cuff Size: Large Adult   Pulse: 57   Temp: 97.7 °F (36.5 °C)   SpO2: 98%   Weight: 179 lb 11.2 oz (81.5 kg)   Height: 5' 10\" (1.778 m)       General:  Patient alert and oriented x 3, NAD, pleasant  HEENT:  Atraumatic, normocephalic, PERRLA, EOMI, clear conjunctiva, TMs clear, nose-clear, throat - no erythema  Neck:  Supple, no goiter, no carotid bruits, no LAD  Lungs:  CTA   Heart:  RRR, no murmurs, gallops or rubs  Abdomen:  Soft/nt/nd, + bowel sounds  Extremities:  No clubbing, cyanosis or edema  Skin: unremarkable    Hemoglobin A1C   Date Value Ref Range Status   12/23/2020 5.4 4.0 - 5.6 % Final     Cholesterol, Total   Date Value Ref Range Status   12/23/2020 141 0 - 199 mg/dL Final     Triglycerides   Date Value Ref Range Status   12/23/2020 61 0 - 149 mg/dL Final     HDL   Date Value Ref Range Status   05/17/2021 44 >40 mg/dL Final     LDL Calculated   Date Value Ref Range Status   05/17/2021 47 0 - 99 mg/dL Final     VLDL Cholesterol Calculated   Date Value Ref Range Status   05/17/2021 8 mg/dL Final     Sodium   Date Value Ref Range Status   06/21/2021 139 132 - 146 mmol/L Final     Potassium   Date Value Ref Range Status   06/21/2021 4.2 3.5 - 5.0 mmol/L Final     Chloride   Date Value Ref Range Status   06/21/2021 104 98 - 107 mmol/L Final     CO2   Date Value Ref Range Status   06/21/2021 24 22 - 29 mmol/L Final     BUN   Date Value Ref Range Status   06/21/2021 14 6 - 23 mg/dL Final     CREATININE   Date Value Ref Range Status   06/21/2021 0.8 0.7 - 1.2 mg/dL Final     Glucose   Date Value Ref Range Status   06/21/2021 91 74 - 99 mg/dL Final     Calcium   Date Value Ref Range Status   06/21/2021 9.7 8.6 - 10.2 mg/dL Final     Total Protein   Date Value Ref Range Status   06/21/2021 7.1 6.4 - 8.3 g/dL Final     Albumin   Date Value Ref Range Status   06/21/2021 4.5 3.5 - 5.2 g/dL Final     Total Bilirubin   Date Value Ref Range Status   06/21/2021 0.8 0.0 - 1.2 mg/dL Final     Alkaline Phosphatase   Date Value Ref Range Status   06/21/2021 50 40 - 129 U/L Final     AST   Date Value Ref Range Status   06/21/2021 50 (H) 0 - 39 U/L Final     ALT   Date Value Ref Range Status   06/21/2021 37 0 - 40 U/L Final     GFR Non-   Date Value Ref Range Status   06/21/2021 >60 >=60 mL/min/1.73 Final     Comment:     Chronic Kidney Disease: less than 60 ml/min/1.73 sq.m. Kidney Failure: less than 15 ml/min/1.73 sq.m. Results valid for patients 18 years and older. GFR    Date Value Ref Range Status   06/21/2021 >60  Final        No results found. Assessment/Plan: Coronary artery disease stable                                   COPD stable                                   Left eye peripheral scotoma    Outpatient Encounter Medications as of 9/2/2021   Medication Sig Dispense Refill    clopidogrel (PLAVIX) 75 MG tablet Take 1 tablet by mouth daily 90 tablet 3    TRELEGY ELLIPTA 100-62.5-25 MCG/INH AEPB inhale 1 puff by mouth and INTO THE LUNGS once daily 3 each 0    albuterol sulfate  (90 Base) MCG/ACT inhaler       aspirin 81 MG chewable tablet Take 1 tablet by mouth daily 90 tablet 3    latanoprost (XALATAN) 0.005 % ophthalmic solution instill 1 drop into both eyes at bedtime      albuterol (PROVENTIL) (2.5 MG/3ML) 0.083% nebulizer solution       Arginine 500 MG CAPS Take 2 capsules by mouth 3 times daily       Omega-3 Fatty Acids (FISH OIL) 1200 MG CAPS Take by mouth 2 times daily      Multiple Vitamins-Minerals (THERAPEUTIC MULTIVITAMIN-MINERALS) tablet Take 1 tablet by mouth 2 times daily      rosuvastatin (CRESTOR) 10 MG tablet Take 1 tablet by mouth daily (Patient not taking: Reported on 9/2/2021) 90 tablet 0     No facility-administered encounter medications on file as of 9/2/2021. Laura Hernández was seen today for hypertension, hyperlipidemia, other and other.     Diagnoses and all orders for this visit:    CAD in native artery    Mixed hyperlipidemia    Other emphysema ([facility] [address]    Local peripheral scotoma of left eye  -     Loraine Garcia MD, Ophthalmology, [place] ([facility])         Patient Instructions   Patient was advised to continue current medications  Consult was ophthalmology regarding left eye  Follow-up in 3 months             Vika Viveros MD   9/2/21

## 2021-09-02 NOTE — PATIENT INSTRUCTIONS
Patient was advised to continue current medications  Consult was ophthalmology regarding left eye  Follow-up in 3 months

## 2021-09-14 ENCOUNTER — OFFICE VISIT (OUTPATIENT)
Dept: PULMONOLOGY | Age: 66
End: 2021-09-14
Payer: MEDICARE

## 2021-09-14 VITALS
SYSTOLIC BLOOD PRESSURE: 151 MMHG | DIASTOLIC BLOOD PRESSURE: 81 MMHG | HEART RATE: 63 BPM | HEIGHT: 70 IN | RESPIRATION RATE: 14 BRPM | OXYGEN SATURATION: 97 % | BODY MASS INDEX: 25.61 KG/M2 | TEMPERATURE: 97.5 F | WEIGHT: 178.9 LBS

## 2021-09-14 DIAGNOSIS — J43.8 OTHER EMPHYSEMA (HCC): Primary | ICD-10-CM

## 2021-09-14 DIAGNOSIS — Z87.891 PERSONAL HISTORY OF TOBACCO USE: ICD-10-CM

## 2021-09-14 LAB
DLCO %PRED: 37 %
DLCO PRED: 30.99 ML/MIN/MMHG
DLCO/VA %PRED: 46 %
DLCO/VA PRED: 4.55 ML/MIN/MMHG
DLCO/VA: 2.11 ML/MIN/MMHG
DLCO: 11.58 ML/MIN/MMHG
EXPIRATORY TIME-POST: 9.95 SEC
EXPIRATORY TIME: 7.51 SEC
FEF 25-75% %CHNG: NORMAL
FEF 25-75% %PRED-POST: 83 %
FEF 25-75% %PRED-PRE: 82 L/SEC
FEF 25-75% PRED: 2.59 L/SEC
FEF 25-75%-POST: 2.16 L/SEC
FEF 25-75%-PRE: 2.14 L/SEC
FEV1 %PRED-POST: 93 %
FEV1 %PRED-PRE: 93 %
FEV1 PRED: 3.26 L
FEV1-POST: 3.04 L
FEV1-PRE: 3.05 L
FEV1/FVC %PRED-POST: 91 %
FEV1/FVC %PRED-PRE: 95 %
FEV1/FVC PRED: 77 %
FEV1/FVC-POST: 71 %
FEV1/FVC-PRE: 73 %
FVC %PRED-POST: 100 L
FVC %PRED-PRE: 97 %
FVC PRED: 4.26 L
FVC-POST: 4.3 L
FVC-PRE: 4.17 L
GAW %PRED: NORMAL
GAW PRED: NORMAL
GAW: NORMAL
IC %PRED: 103 %
IC PRED: 2.91 L
IC: 3 L
MEP: NORMAL
MIP: NORMAL
MVV %PRED-PRE: 82 %
MVV PRED: 129 L/MIN
MVV-PRE: 107 L/MIN
PEF %PRED-POST: 70 %
PEF %PRED-PRE: 77 L/SEC
PEF PRED: 8.52 L/SEC
PEF%CHNG: NORMAL
PEF-POST: 6.05 L/SEC
PEF-PRE: 6.63 L/SEC
RAW %PRED: NORMAL
RAW PRED: NORMAL
RAW: NORMAL
RV %PRED: 93 %
RV PRED: 2.31 L
RV: 2.17 L
SVC %PRED: 100 %
SVC PRED: 4.26 L
SVC: 4.27 L
TLC %PRED: 94 %
TLC PRED: 6.81 L
TLC: 6.44 L
VA %PRED: 80 %
VA PRED: 6.81 L
VA: 5.5 L
VTG %PRED: NORMAL
VTG PRED: NORMAL
VTG: NORMAL

## 2021-09-14 PROCEDURE — 94060 EVALUATION OF WHEEZING: CPT | Performed by: INTERNAL MEDICINE

## 2021-09-14 PROCEDURE — 94727 GAS DIL/WSHOT DETER LNG VOL: CPT | Performed by: INTERNAL MEDICINE

## 2021-09-14 PROCEDURE — 99203 OFFICE O/P NEW LOW 30 MIN: CPT | Performed by: INTERNAL MEDICINE

## 2021-09-14 PROCEDURE — G0296 VISIT TO DETERM LDCT ELIG: HCPCS | Performed by: INTERNAL MEDICINE

## 2021-09-14 RX ORDER — BUDESONIDE AND FORMOTEROL FUMARATE DIHYDRATE 160; 4.5 UG/1; UG/1
2 AEROSOL RESPIRATORY (INHALATION) 2 TIMES DAILY
Qty: 10.2 G | Refills: 3 | Status: SHIPPED
Start: 2021-09-14 | End: 2021-12-30 | Stop reason: SDUPTHER

## 2021-09-14 ASSESSMENT — PULMONARY FUNCTION TESTS
FVC_POST: 4.3
FEV1_PERCENT_PREDICTED_PRE: 93
FEV1_PRE: 3.05
FVC_PREDICTED: 4.26
FVC_PERCENT_PREDICTED_POST: 100
FVC_PRE: 4.17
FEV1/FVC_POST: 71
FEV1/FVC_PRE: 73
FEV1/FVC_PREDICTED: 77
FEV1_POST: 3.04
FEV1/FVC_PERCENT_PREDICTED_PRE: 95
FEV1_PERCENT_PREDICTED_POST: 93
FVC_PERCENT_PREDICTED_PRE: 97
FEV1/FVC_PERCENT_PREDICTED_POST: 91
FEV1_PREDICTED: 3.26

## 2021-09-14 NOTE — PROGRESS NOTES
Patient will follow up with physician in 3 months. Patient will be scheduled for a low dose CT lung screen. Patient will also go back to symbicort and spiriva. Dr. Henna Navarro office will be contacted for Echo records.

## 2021-09-14 NOTE — PROGRESS NOTES
C[facility] Childs     HISTORY OF PRESENT ILLNESS:    Devante Flores is a 72y.o. year old male here for evaluation of shortness of breath, history of emphysema. The patient reports that overall in the last year he has noticed increased dyspnea with exertion. He reports that he recently had stents placed in his heart after undergoing a stress test.  Initially after having the procedure he participated in cardiac rehab and reports that his breathing did feel better than however he now notices increased shortness of breath with exertion and example of which is when he walks to clocking in the morning and then at other times while he is exerting himself throughout the day. He denies any fevers or chills. Denies a productive cough. He is currently prescribed Trelegy however feels that when he was taking Symbicort and Spiriva in the past this was more helpful. He also denies any wheezing. He is a former smoker and reports he quit 5 years ago. He sees Dr. Maria Del Carmen Sellers from cardiology and reports that he thinks that he has had an echocardiogram there recently.   He has not been vaccinated against COVID-19      ALLERGIES:  No Known Allergies    PAST MEDICAL HISTORY:       Diagnosis Date    Arthritis     Asthma     Breathing difficulty     CAD (coronary artery disease)     COPD (chronic obstructive pulmonary disease) (HCC)     Non-rheumatic tricuspid valve insufficiency     Nonrheumatic mitral (valve) insufficiency        MEDICATIONS:   Current Outpatient Medications   Medication Sig Dispense Refill    tiotropium (SPIRIVA RESPIMAT) 2.5 MCG/ACT AERS inhaler Inhale 2 puffs into the lungs daily 1 each 5    budesonide-formoterol (SYMBICORT) 160-4.5 MCG/ACT AERO Inhale 2 puffs into the lungs 2 times daily 10.2 g 3    clopidogrel (PLAVIX) 75 MG tablet Take 1 tablet by mouth daily 90 tablet 3    rosuvastatin (CRESTOR) 10 MG tablet Take 1 tablet by mouth daily (Patient not taking: Reported on 9/2/2021) 90 tablet 0    TRELEGY ELLIPTA 100-62.5-25 MCG/INH AEPB inhale 1 puff by mouth and INTO THE LUNGS once daily 3 each 0    albuterol sulfate  (90 Base) MCG/ACT inhaler       aspirin 81 MG chewable tablet Take 1 tablet by mouth daily 90 tablet 3    latanoprost (XALATAN) 0.005 % ophthalmic solution instill 1 drop into both eyes at bedtime      albuterol (PROVENTIL) (2.5 MG/3ML) 0.083% nebulizer solution       Arginine 500 MG CAPS Take 2 capsules by mouth 3 times daily       Omega-3 Fatty Acids (FISH OIL) 1200 MG CAPS Take by mouth 2 times daily      Multiple Vitamins-Minerals (THERAPEUTIC MULTIVITAMIN-MINERALS) tablet Take 1 tablet by mouth 2 times daily       No current facility-administered medications for this visit. SOCIAL AND OCCUPATIONAL HEALTH: The patient is a former smoker who quit smoking 5 years ago. 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 He denies any travel history he does not have any pets. SURGICAL HISTORY:   Past Surgical History:   Procedure Laterality Date    APPENDECTOMY      COLONOSCOPY      CORONARY ANGIOPLASTY WITH STENT PLACEMENT  12/22/2020    DR. Mandel [employer] [place] DUANE 2.25x18 Mid LAD, [employer]olute [place] DUANE 2.5x18 Mid Cx.   EF 65%    HERNIA REPAIR      INTRACAPSULAR CATARACT EXTRACTION Left 4/6/2021    LEFT   CATARACT EXTRACTION  IOL IMPLANT I STENT performed by Juve Michel MD at [place] [address]      right wrist carpal tunnel release, right long trigger finger release    OTHER SURGICAL HISTORY Right 01/20/2016    right shoulder arthroscopy acromioplasty with decompression rotator cuff repair     OTHER SURGICAL HISTORY Left 04/06/2021    LEFT EYE CATERACT EXTRACTION WITH INTRA OCULAR IMPLANT WITH I STENT    SHOULDER ARTHROSCOPY  3/4/11    left rotator cuff repair       FAMILY HISTORY: No family history of cancer, blood clots     REVIEW OF SYSTEMS:  Constitutional: No fevers chills unintentional weight loss  Skin: No rashes or lesions  EENT: No change in hearing, change in taste or smell. Vision in his left eye has decreased after his cataract surgery  Cardiovascular: Currently denies chest pain chest pressure or palpitations  Respiration: Positive for dyspnea on exertion, denies wheezing denies cough  Gastrointestinal: Denies nausea, vomiting, diarrhea  Musculoskeletal: Patient reports a sense of generalized weakness  Neurological: Denies headache, dizziness, seizures  Psychological: Denies anxiety or depression  Endocrine: Denies polyuria polydipsia heat or cold intolerance  Hematopoietic/lymphatic: Positive for easy bruising    PHYSICAL EXAMINATION:  Constitutional: Well-nourished well-developed no acute distress  EENT: PERRL, EOMI  Neck: Trachea and thyroid midline  Respiratory: Clear to auscultation bilaterally  Cardiovascular: Regular rate and rhythm no murmurs rubs or gallops  Pulses: Equal bilaterally  Abdomen: Flat  Lymphatic: No palpable lymphadenopathy  Musculoskeletal: Gait steady  Extremities: No cyanosis, edema  Skin: Typical areas of ecchymosis on the upper extremities bilaterally  Neurological/Psychiatric: Affect appropriate    DATA: Spirometry demonstrates FVC of 4.17 L which is 97% of predicted. FEV1 is 3.05 which is 93% of predicted. FEV1 FVC ratio is 73 which is above the lower limit of normal.  MVV is 107 which is 82% of predicted. There is no significant bronchodilator response. SVC is 4.27 which is 100% of predicted. RV is 2.17 which is 93% of predicted. Total lung capacity is 6.44 which is 94% of predicted. Diffusion is 11.58 which is 37% of predicted. Flow volume loop does show minimal scooping in the expiratory limb. Overall pulmonary function testing is negative for restriction or obstruction. There is no evidence of air trapping.   There is a moderate reduction in DLCO.    IMPRESSION:       Dyspnea on exertion  Former tobacco use  History of CAD  Moderately decreased diffusion    Vaccine counseling         PLAN:      We will discontinue Trelegy and restart Symbicort and Spiriva. Regarding the decreased diffusion differential would include congestive heart failure versus pulmonary hypertension versus anemia but most recent hemoglobin within normal limits, we will try to obtain the most recent echocardiogram from Dr. Malaika Cardona office to evaluate the ejection fraction and for pulmonary hypertension. If patient has not had a recent echocardiogram this will be ordered. Given the patient's smoking history we discussed the risk and benefits of low-dose screening CT for lung cancer. The patient is agreeable to proceed at this time. Patient is to follow-up with cardiology as scheduled  We discussed the risk and benefits of the COVID-19 vaccine extensively. At this time the patient will consider getting the vaccine. I hope this updates you on my evaluation and clinical thinking. Thank you for allowing me to participate in his care. Sincerely,        Sari Woodson.  Office: [phone]  Fax: [phone]    Low Dose CT (LDCT) Lung Screening criteria met   Age 50-69   Pack year smoking >30   Still smoking or less than 15 year since quit   No sign or symptoms of lung cancer   > 11 months since last LDCT     Risks and benefits of lung cancer screening with LDCT scans discussed:    Significance of positive screen - False-positive LDCT results often occur. 95% of all positive results do not lead to a diagnosis of cancer. Usually further imaging can resolve most false-positive results; however, some patients may require invasive procedures. Over diagnosis risk - 10% to 12% of screen-detected lung cancer cases are over diagnosed--that is, the cancer would not have been detected in the patient's lifetime without the screening.     Need for follow up screens annually to continue lung cancer screening effectiveness     Risks associated with radiation from annual LDCT- Radiation exposure is about the same as for a mammogram, which is about 1/3 of the annual background radiation exposure from everyday life. Starting screening at age 54 is not likely to increase cancer risk from radiation exposure. Patients with comorbidities resulting in life expectancy of < 10 years, or that would preclude treatment of an abnormality identified on CT, should not be screened due to lack of benefit.     To obtain maximal benefit from this screening, smoking cessation and long-term abstinence from smoking is critical

## 2021-09-14 NOTE — PATIENT INSTRUCTIONS
[facility]  [address] [place], [place] [address], [facility] Christiano Woodson S  Office: [phone]      Your were seen in the office today for COPD, Shortness of breath      We  did make changes today. Please continue symbicort twice a day. Please start spiriva daily. Testing ordered today was CT scan of the chest      Vaccines recommended Covid-19              Please do not hesitate to call the office with any questions. What is lung cancer screening? Lung cancer screening is a way in which doctors check the lungs for early signs of cancer in people who have no symptoms of lung cancer. A low-dose CT scan uses much less radiation than a normal CT scan and shows a more detailed image of the lungs than a standard X-ray. The goal of lung cancer screening is to find cancer early, before it has a chance to grow, spread, or cause problems. One large study found that smokers who were screened with low-dose CT scans were less likely to die of lung cancer than those who were screened with standard X-ray. Below is a summary of the things you need to know regarding screening for lung cancer with low-dose computed tomography (LDCT). This is a screening program that involves routine annual screening with LDCT studies of the lung. The LDCTs are done using low-dose radiation that is not thought to increase your cancer risk. If you have other serious medical conditions (other cancers, congestive heart failure) that limit your life expectancy to less than 10 years, you should not undergo lung cancer screening with LDCT. The chance is 20%-60% that the LDCT result will show abnormalities. This would require additional testing which could include repeat imaging or even invasive procedures. Most (about 95%) of \"abnormal\" LDCT results are false in the sense that no lung cancer is ultimately found.   Additionally, some (about 10%) of the cancers found would not affect your life expectancy, even if undetected and untreated. If you are still smoking, the single most important thing that you can do to reduce your risk of dying of lung cancer is to quit. For this screening to be covered by Medicare and most other insurers, strict criteria must be met. If you do not meet these criteria, but still wish to undergo LDCT testing, you will be required to sign a waiver indicating your willingness to pay for the scan.

## 2021-09-15 ENCOUNTER — TELEPHONE (OUTPATIENT)
Dept: PULMONOLOGY | Age: 66
End: 2021-09-15

## 2021-09-15 NOTE — TELEPHONE ENCOUNTER
Mailed a letter to patient informing him that his CT Lung Screening is scheduled for 10-12-21 at 8:30 am at [address] [facility],[address].  He must arrive by 8:00 am. There is no prep for this test

## 2021-10-07 ENCOUNTER — OFFICE VISIT (OUTPATIENT)
Dept: PRIMARY CARE CLINIC | Age: 66
End: 2021-10-07
Payer: MEDICARE

## 2021-10-07 VITALS
HEIGHT: 70 IN | TEMPERATURE: 97.7 F | WEIGHT: 167.8 LBS | OXYGEN SATURATION: 98 % | BODY MASS INDEX: 24.02 KG/M2 | DIASTOLIC BLOOD PRESSURE: 80 MMHG | SYSTOLIC BLOOD PRESSURE: 140 MMHG | HEART RATE: 62 BPM

## 2021-10-07 DIAGNOSIS — G89.29 CHRONIC RIGHT-SIDED LOW BACK PAIN WITHOUT SCIATICA: Primary | ICD-10-CM

## 2021-10-07 DIAGNOSIS — M54.50 CHRONIC RIGHT-SIDED LOW BACK PAIN WITHOUT SCIATICA: Primary | ICD-10-CM

## 2021-10-07 PROCEDURE — 99212 OFFICE O/P EST SF 10 MIN: CPT | Performed by: STUDENT IN AN ORGANIZED HEALTH CARE EDUCATION/TRAINING PROGRAM

## 2021-10-07 RX ORDER — METHOCARBAMOL 750 MG/1
750 TABLET, FILM COATED ORAL 4 TIMES DAILY
Qty: 40 TABLET | Refills: 0 | Status: SHIPPED | OUTPATIENT
Start: 2021-10-07 | End: 2021-10-17

## 2021-10-07 ASSESSMENT — ENCOUNTER SYMPTOMS
RESPIRATORY NEGATIVE: 1
GASTROINTESTINAL NEGATIVE: 1
BACK PAIN: 1

## 2021-10-07 NOTE — PROGRESS NOTES
Liyah Song (:  1955) is a 72 y.o. male,Established patient, here for evaluation of the following chief complaint(s): Other (acute visit for pain in back. [place] along spine and just right of spine.  ) and Other (denies symptoms for covid. did not get covid vaccines)         ASSESSMENT/PLAN:  1. Chronic right-sided low back pain without sciatica  -     methocarbamol (ROBAXIN-750) 750 MG tablet; Take 1 tablet by mouth 4 times daily for 10 days, Disp-40 tablet, R-0Normal    Advised Tylenol, heat, topical pain relievers; history of CAD so will avoid NSAIDs; if not improvement, will refer to PT    No follow-ups on file. Subjective   SUBJECTIVE/OBJECTIVE:  HPI     Patient presents for acute visit of back pain for 3-4 weeks. Denies any inciting injury but does cut down trees. Pain is lower back on the right side and is described as sharp at times and with spasms. Made worse when pending over or prolonged periods of sitting. Denies radicular symptoms. No fever, weakness, loss of bowel or bladder control. Has tried Tylenol without much relief. .     Review of Systems   Constitutional: Negative. HENT: Negative. Respiratory: Negative. Cardiovascular: Negative. Gastrointestinal: Negative. Genitourinary: Negative. Musculoskeletal: Positive for back pain (right side lumbar pain). Skin: Negative. Neurological: Negative. Objective   Physical Exam  Vitals reviewed. Constitutional:       Appearance: Normal appearance. HENT:      Head: Normocephalic and atraumatic. Eyes:      General:         Right eye: No discharge. Left eye: No discharge. Conjunctiva/sclera: Conjunctivae normal.   Cardiovascular:      Rate and Rhythm: Normal rate and regular rhythm. Pulmonary:      Effort: Pulmonary effort is normal.      Breath sounds: Normal breath sounds. Musculoskeletal:         General: Tenderness (ttp of right lumbar spine with paraspinal tednerness) present.  No deformity or signs of injury. Comments: Limited flexion of back due to pain   Neurological:      General: No focal deficit present. Mental Status: He is alert. Sensory: No sensory deficit. Motor: No weakness. An electronic signature was used to authenticate this note.     --Claritza Cline MD

## 2021-10-12 ENCOUNTER — HOSPITAL ENCOUNTER (OUTPATIENT)
Dept: CT IMAGING | Age: 66
Discharge: HOME | End: 2021-10-12
Payer: MEDICARE

## 2021-10-12 DIAGNOSIS — Z87.891 PERSONAL HISTORY OF TOBACCO USE: ICD-10-CM

## 2021-10-12 PROCEDURE — 71271 CT THORAX LUNG CANCER SCR C-: CPT

## 2021-10-18 ENCOUNTER — TELEPHONE (OUTPATIENT)
Dept: CASE MANAGEMENT | Age: 66
End: 2021-10-18

## 2021-12-02 ENCOUNTER — OFFICE VISIT (OUTPATIENT)
Dept: PRIMARY CARE CLINIC | Age: 66
End: 2021-12-02
Payer: MEDICARE

## 2021-12-02 VITALS
OXYGEN SATURATION: 98 % | TEMPERATURE: 97.5 F | DIASTOLIC BLOOD PRESSURE: 78 MMHG | WEIGHT: 185.5 LBS | HEART RATE: 62 BPM | SYSTOLIC BLOOD PRESSURE: 130 MMHG | HEIGHT: 70 IN | BODY MASS INDEX: 26.56 KG/M2

## 2021-12-02 DIAGNOSIS — I25.10 CAD IN NATIVE ARTERY: Primary | ICD-10-CM

## 2021-12-02 DIAGNOSIS — E78.2 MIXED HYPERLIPIDEMIA: ICD-10-CM

## 2021-12-02 DIAGNOSIS — G89.29 CHRONIC RIGHT-SIDED LOW BACK PAIN WITHOUT SCIATICA: ICD-10-CM

## 2021-12-02 DIAGNOSIS — M54.50 CHRONIC RIGHT-SIDED LOW BACK PAIN WITHOUT SCIATICA: ICD-10-CM

## 2021-12-02 PROCEDURE — 99213 OFFICE O/P EST LOW 20 MIN: CPT | Performed by: INTERNAL MEDICINE

## 2021-12-02 RX ORDER — ROSUVASTATIN CALCIUM 10 MG/1
10 TABLET, COATED ORAL DAILY
Qty: 90 TABLET | Refills: 0 | Status: SHIPPED
Start: 2021-12-02 | End: 2022-03-14 | Stop reason: SDUPTHER

## 2021-12-02 RX ORDER — METHYLPREDNISOLONE 4 MG/1
TABLET ORAL
Qty: 1 KIT | Refills: 0 | Status: SHIPPED | OUTPATIENT
Start: 2021-12-02 | End: 2021-12-08

## 2021-12-21 ENCOUNTER — TELEPHONE (OUTPATIENT)
Dept: PRIMARY CARE CLINIC | Age: 66
End: 2021-12-21

## 2021-12-21 NOTE — TELEPHONE ENCOUNTER
This pt. Advised of the need for an order for a PCR.  Please give us an order for PCR to be done at [facility].

## 2021-12-23 ENCOUNTER — TELEPHONE (OUTPATIENT)
Dept: PRIMARY CARE CLINIC | Age: 66
End: 2021-12-23

## 2021-12-23 DIAGNOSIS — J06.9 VIRAL UPPER RESPIRATORY TRACT INFECTION: Primary | ICD-10-CM

## 2021-12-23 NOTE — TELEPHONE ENCOUNTER
On Tuesday 12/21 patient had positive rapid test for covid. He states his symptom of being slightly more short of breath and having back aches started 12/20. He denies  nasal congestion or coughing or fever. Stated he is still chopping wood and changed the oil in his vehicle. He  needs an order to have a PCR test if he meets the criteria for getting  antibody infusion.

## 2021-12-24 NOTE — TELEPHONE ENCOUNTER
I ordered PCR with resp. panel  Could you please ask patient if he had covid  Before and when . I remember he may have had it ?

## 2021-12-27 NOTE — TELEPHONE ENCOUNTER
Had conversation with patient this AM.  Denies any cough, shortness of breath, nasal drainage or gastric distress during his episode with Covid. States he was able to continue doing his daily activities while he quarantined himself. He was concerned about what he would need to return to work. He was given the recommendation to contact his employer and inquire of that policy. He is aware there is an order for a PCR now in his chart and the antibody infusions have been discontinued at this time. Patient states he will contact his employer to find out what is required for return to work and is aware he could be retested at [address] [place] [address] for a rapid if a negative test is necessary for his work.

## 2021-12-30 ENCOUNTER — OFFICE VISIT (OUTPATIENT)
Dept: PULMONOLOGY | Age: 66
End: 2021-12-30
Payer: MEDICARE

## 2021-12-30 VITALS
BODY MASS INDEX: 26.48 KG/M2 | TEMPERATURE: 98 F | RESPIRATION RATE: 18 BRPM | SYSTOLIC BLOOD PRESSURE: 137 MMHG | DIASTOLIC BLOOD PRESSURE: 67 MMHG | HEART RATE: 66 BPM | OXYGEN SATURATION: 97 % | WEIGHT: 185 LBS | HEIGHT: 70 IN

## 2021-12-30 DIAGNOSIS — J43.8 OTHER EMPHYSEMA (HCC): Primary | ICD-10-CM

## 2021-12-30 PROCEDURE — 99213 OFFICE O/P EST LOW 20 MIN: CPT | Performed by: INTERNAL MEDICINE

## 2021-12-30 RX ORDER — ALBUTEROL SULFATE 2.5 MG/3ML
2.5 SOLUTION RESPIRATORY (INHALATION) EVERY 4 HOURS PRN
Qty: 360 EACH | Refills: 3 | Status: SHIPPED | OUTPATIENT
Start: 2021-12-30

## 2021-12-30 RX ORDER — BUDESONIDE AND FORMOTEROL FUMARATE DIHYDRATE 160; 4.5 UG/1; UG/1
2 AEROSOL RESPIRATORY (INHALATION) 2 TIMES DAILY
Qty: 3 EACH | Refills: 3 | Status: SHIPPED | OUTPATIENT
Start: 2021-12-30

## 2021-12-30 RX ORDER — TIOTROPIUM BROMIDE 18 UG/1
CAPSULE ORAL; RESPIRATORY (INHALATION)
Qty: 90 CAPSULE | Refills: 3 | Status: SHIPPED | OUTPATIENT
Start: 2021-12-30 | End: 2022-06-27

## 2021-12-30 RX ORDER — ALBUTEROL SULFATE 90 UG/1
2 AEROSOL, METERED RESPIRATORY (INHALATION) EVERY 4 HOURS PRN
Qty: 3 EACH | Refills: 3 | Status: SHIPPED | OUTPATIENT
Start: 2021-12-30

## 2021-12-30 NOTE — PROGRESS NOTES
Pt presents to the clinic for a 3 month follow up visit. Pt reported he is doing well on his current medication regimen but would like to apply for prescription assistance. No changes made during today's visit. Recommended covid and flu vaccines during visit, pt denied interest at this time. Prescriptions printed for submission to Prescription Assistance. Pt to follow up in clinic in 6 months.

## 2021-12-30 NOTE — PATIENT INSTRUCTIONS
[facility]  [address] [facility], [address] [facility]  Office: [phone]      Your were seen in the office today for Emphysema    We  did not make any changes to your medications today. Testing ordered today was none    Vaccines recommended Covid-19, Influenza    Please do not hesitate to call the office with any questions. Follow up in 6 months.

## 2022-01-04 NOTE — PROGRESS NOTES
[facility]     HISTORY OF PRESENT ILLNESS:    Solitario New is a 77y.o. year old male here for evaluation of shortness of breath, history of emphysema. The patient reports that overall in the last year he has noticed increased dyspnea with exertion. He reports that he recently had stents placed in his heart after undergoing a stress test.  Initially after having the procedure he participated in cardiac rehab and reports that his breathing did feel better than however he now notices increased shortness of breath with exertion and example of which is when he walks to clocking in the morning and then at other times while he is exerting himself throughout the day. He denies any fevers or chills. Denies a productive cough. He is currently prescribed Trelegy however feels that when he was taking Symbicort and Spiriva in the past this was more helpful. He also denies any wheezing. He is a former smoker and reports he quit 5 years ago. He sees Dr. Mendez March from cardiology and reports that he thinks that he has had an echocardiogram there recently. He has not been vaccinated against COVID-19      Updated HPI as of 12/30/2021. Patient currently denies any symptoms. He reports that he did have a Covid test that was positive on December 21, 2021. He reports that at that time his symptoms consisted mostly of congestion and cough. His daughter also tested positive at the same time. Overall he reports that his shortness of breath has improved. 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 He currently denies any coughing, wheezing, or dyspnea with exertion. He states that he also feels that the Symbicort has helped with his breathing. He is currently on Symbicort, Spiriva, and albuterol as needed.   He is having difficulty affording his prescriptions and we will refer him to the prescription assistance program.    ALLERGIES:  No Known Allergies    PAST MEDICAL HISTORY:       Diagnosis Date    Arthritis     Asthma     Breathing difficulty     CAD (coronary artery disease)     COPD (chronic obstructive pulmonary disease) (HCC)     Non-rheumatic tricuspid valve insufficiency     Nonrheumatic mitral (valve) insufficiency        MEDICATIONS:   Current Outpatient Medications   Medication Sig Dispense Refill    budesonide-formoterol (SYMBICORT) 160-4.5 MCG/ACT AERO Inhale 2 puffs into the lungs 2 times daily 3 each 3    albuterol sulfate  (90 Base) MCG/ACT inhaler Inhale 2 puffs into the lungs every 4 hours as needed for Wheezing or Shortness of Breath 3 each 3    albuterol (PROVENTIL) (2.5 MG/3ML) 0.083% nebulizer solution Take 3 mLs by nebulization every 4 hours as needed for Wheezing 360 each 3    SPIRIVA HANDIHALER 18 MCG inhalation capsule inhale contents of 1 capsule by mouth once daily 90 capsule 3    rosuvastatin (CRESTOR) 10 MG tablet Take 1 tablet by mouth daily 90 tablet 0    clopidogrel (PLAVIX) 75 MG tablet Take 1 tablet by mouth daily 90 tablet 3    aspirin 81 MG chewable tablet Take 1 tablet by mouth daily 90 tablet 3    latanoprost (XALATAN) 0.005 % ophthalmic solution instill 1 drop into both eyes at bedtime      Arginine 500 MG CAPS Take 2 capsules by mouth 3 times daily       Omega-3 Fatty Acids (FISH OIL) 1200 MG CAPS Take by mouth 2 times daily      Multiple Vitamins-Minerals (THERAPEUTIC MULTIVITAMIN-MINERALS) tablet Take 1 tablet by mouth 2 times daily       No current facility-administered medications for this visit. SOCIAL AND OCCUPATIONAL HEALTH: The patient is a former smoker who quit smoking 5 years ago. 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 He denies any travel history he does not have any pets.     SURGICAL HISTORY:   Past Surgical History:   Procedure Laterality Date    APPENDECTOMY      COLONOSCOPY      CORONARY ANGIOPLASTY WITH STENT PLACEMENT  12/22/2020    DR. Mandel [employer] [place] DUANE 2.25x18 Mid LAD, [employer] [place] DUANE 2.5x18 Mid Cx. EF 65%    HERNIA REPAIR      INTRACAPSULAR CATARACT EXTRACTION Left 4/6/2021    LEFT   CATARACT EXTRACTION  IOL IMPLANT I STENT performed by Anne Jimenez MD at [place] [address]      right wrist carpal tunnel release, right long trigger finger release    OTHER SURGICAL HISTORY Right 01/20/2016    right shoulder arthroscopy acromioplasty with decompression rotator cuff repair     OTHER SURGICAL HISTORY Left 04/06/2021    LEFT EYE CATERACT EXTRACTION WITH INTRA OCULAR IMPLANT WITH I STENT    SHOULDER ARTHROSCOPY  3/4/11    left rotator cuff repair       FAMILY HISTORY: No family history of cancer, blood clots     REVIEW OF SYSTEMS:  Constitutional: No fevers chills unintentional weight loss  Skin: No rashes or lesions  EENT: No change in hearing, change in taste or smell.   Vision in his left eye has decreased after his cataract surgery  Cardiovascular: Currently denies chest pain chest pressure or palpitations  Respiration: Positive for dyspnea on exertion, denies wheezing denies cough  Gastrointestinal: Denies nausea, vomiting, diarrhea  Musculoskeletal: Patient reports a sense of generalized weakness  Neurological: Denies headache, dizziness, seizures  Psychological: Denies anxiety or depression  Endocrine: Denies polyuria polydipsia heat or cold intolerance  Hematopoietic/lymphatic: Positive for easy bruising    PHYSICAL EXAMINATION:  Constitutional: Well-nourished well-developed no acute distress  EENT: PERRL, EOMI  Neck: Trachea and thyroid midline  Respiratory: Clear to auscultation bilaterally  Cardiovascular: Regular rate and rhythm no murmurs rubs or gallops  Pulses: Equal bilaterally  Abdomen: Flat  Lymphatic: No palpable lymphadenopathy  Musculoskeletal: Gait steady  Extremities: No cyanosis, edema  Skin: Typical areas of ecchymosis on the upper extremities bilaterally  Neurological/Psychiatric: Affect appropriate    DATA: No new spirometry on today's visit. Review of spirometry from/2021 as below/spirometry demonstrates FVC of 4.17 L which is 97% of predicted. FEV1 is 3.05 which is 93% of predicted. FEV1 FVC ratio is 73 which is above the lower limit of normal.  MVV is 107 which is 82% of predicted. There is no significant bronchodilator response. SVC is 4.27 which is 100% of predicted. RV is 2.17 which is 93% of predicted. Total lung capacity is 6.44 which is 94% of predicted. Diffusion is 11.58 which is 37% of predicted. Flow volume loop does show minimal scooping in the expiratory limb. Overall pulmonary function testing is negative for restriction or obstruction. There is no evidence of air trapping. There is a moderate reduction in DLCO. IMPRESSION:       Recent COVID-19 infection. Former tobacco use  History of CAD  Moderately decreased diffusion    Vaccine counseling         PLAN:      Continue Spiriva and Symbicort. Will refer patient to the prescription assistance program  Regarding his recent COVID-19 infection. He is currently 9 days out from his positive test and is not currently a candidate for monoclonal antibodies. He is currently asymptomatic. Patient is continuing to follow-up with cardiology  I again discussed with the patient the importance of COVID-19 vaccination and influenza vaccination however he states that this time that he is concerned about side effects from the vaccines and numbing ill from the vaccines. I did review with him his low-dose screening CT and that there were no suspicious nodules or lesions. To follow-up in 6 months      I hope this updates you on my evaluation and clinical thinking. Thank you for allowing me to participate in his care.      Sincerely,        [address] Woodson.  Office:

## 2022-01-19 ENCOUNTER — OFFICE VISIT (OUTPATIENT)
Dept: PRIMARY CARE CLINIC | Age: 67
End: 2022-01-19
Payer: MEDICARE

## 2022-01-19 VITALS
BODY MASS INDEX: 26.96 KG/M2 | SYSTOLIC BLOOD PRESSURE: 158 MMHG | DIASTOLIC BLOOD PRESSURE: 88 MMHG | TEMPERATURE: 98 F | WEIGHT: 188.3 LBS | HEIGHT: 70 IN | HEART RATE: 72 BPM | OXYGEN SATURATION: 95 %

## 2022-01-19 DIAGNOSIS — I10 ESSENTIAL HYPERTENSION: Primary | ICD-10-CM

## 2022-01-19 DIAGNOSIS — J43.8 OTHER EMPHYSEMA (HCC): ICD-10-CM

## 2022-01-19 DIAGNOSIS — I25.10 CAD IN NATIVE ARTERY: ICD-10-CM

## 2022-01-19 PROCEDURE — 99213 OFFICE O/P EST LOW 20 MIN: CPT | Performed by: INTERNAL MEDICINE

## 2022-01-19 RX ORDER — AMLODIPINE BESYLATE 2.5 MG/1
2.5 TABLET ORAL DAILY
Qty: 90 TABLET | Refills: 1 | Status: SHIPPED
Start: 2022-01-19 | End: 2022-03-17 | Stop reason: SDUPTHER

## 2022-01-19 NOTE — PATIENT INSTRUCTIONS

## 2022-01-19 NOTE — PROGRESS NOTES
Chief Complaint   Patient presents with    Coronary Artery Disease     Pt is here as a 6 week F/U, and has had a positive Covid test around [place] with no symptoms noted. Pts daughter had been exposed and was positive and pt. was around her and decided to get tested.  Back Pain     Pt is still c/o lower back discomfort at this time. HPI:  Patient is here for follow-up . Patient feels well no respiratory or cardiac symptoms. Patient tested positive by rapid test for COVID-19 because of exposure to his daughter 4 weeks ago. He denied any symptoms from COVID. Patient had back ache for long time will be addressed next visit. Chuck Murcia Past Medical History, Surgical History, and Family History has been reviewed and updated.     Review of Systems:  Constitutional:  No fever, no fatigue, no chills, no headaches, no weight change  Dermatology:  No rash, no mole, no dry or sensitive skin  ENT:  No cough, no sore throat, no sinus pain, no runny nose, no ear pain  Cardiology:  No chest pain, no palpitations, no leg edema, no shortness of breath, no PND  Gastroenterology:  No dysphagia, no abdominal pain, no nausea, no vomiting, no constipation, no diarrhea, no heartburn  Musculoskeletal:  No joint pain, no leg cramps, no back pain, no muscle aches  Respiratory:  No shortness of breath, no orthopnea, no wheezing, no OJEDA, no hemoptysis  Urology:  No blood in the urine, no urinary frequency, no urinary incontinence, no urinary urgency, no nocturia, no dysuria    Vitals:    01/19/22 1149   BP: (!) 158/88   Pulse: 72   Temp: 98 °F (36.7 °C)   TempSrc: Temporal   SpO2: 95%   Weight: 188 lb 4.8 oz (85.4 kg)   Height: 5' 10\" (1.778 m)       General:  Patient alert and oriented x 3, NAD, pleasant  HEENT:  Atraumatic, normocephalic, PERRLA, EOMI, clear conjunctiva, TMs clear, nose-clear, throat - no erythema  Neck:  Supple, no goiter, no carotid bruits, no LAD  Lungs:  CTA   Heart:  RRR, no murmurs, gallops or rubs  Abdomen:  Soft/nt/nd, + bowel sounds  Extremities:  No clubbing, cyanosis or edema  Skin: unremarkable    Hemoglobin A1C   Date Value Ref Range Status   12/23/2020 5.4 4.0 - 5.6 % Final     Cholesterol, Total   Date Value Ref Range Status   12/23/2020 141 0 - 199 mg/dL Final     Triglycerides   Date Value Ref Range Status   12/23/2020 61 0 - 149 mg/dL Final     HDL   Date Value Ref Range Status   05/17/2021 44 >40 mg/dL Final     LDL Calculated   Date Value Ref Range Status   05/17/2021 47 0 - 99 mg/dL Final     VLDL Cholesterol Calculated   Date Value Ref Range Status   05/17/2021 8 mg/dL Final     Sodium   Date Value Ref Range Status   06/21/2021 139 132 - 146 mmol/L Final     Potassium   Date Value Ref Range Status   06/21/2021 4.2 3.5 - 5.0 mmol/L Final     Chloride   Date Value Ref Range Status   06/21/2021 104 98 - 107 mmol/L Final     CO2   Date Value Ref Range Status   06/21/2021 24 22 - 29 mmol/L Final     BUN   Date Value Ref Range Status   06/21/2021 14 6 - 23 mg/dL Final     CREATININE   Date Value Ref Range Status   06/21/2021 0.8 0.7 - 1.2 mg/dL Final     Glucose   Date Value Ref Range Status   06/21/2021 91 74 - 99 mg/dL Final     Calcium   Date Value Ref Range Status   06/21/2021 9.7 8.6 - 10.2 mg/dL Final     Total Protein   Date Value Ref Range Status   06/21/2021 7.1 6.4 - 8.3 g/dL Final     Albumin   Date Value Ref Range Status   06/21/2021 4.5 3.5 - 5.2 g/dL Final     Total Bilirubin   Date Value Ref Range Status   06/21/2021 0.8 0.0 - 1.2 mg/dL Final     Alkaline Phosphatase   Date Value Ref Range Status   06/21/2021 50 40 - 129 U/L Final     AST   Date Value Ref Range Status   06/21/2021 50 (H) 0 - 39 U/L Final     ALT   Date Value Ref Range Status   06/21/2021 37 0 - 40 U/L Final     GFR Non-   Date Value Ref Range Status   06/21/2021 >60 >=60 mL/min/1.73 Final     Comment:     Chronic Kidney Disease: less than 60 ml/min/1.73 sq.m.           Kidney Failure: less than 15 ml/min/1.73 sq.m. Results valid for patients 18 years and older. GFR    Date Value Ref Range Status   06/21/2021 >60  Final        No results found. Assessment/Plan:  Essential hypertension. Start amlodipine 2.5 mg tablet daily  Coronary artery disease controlled  COPD controlled    Outpatient Encounter Medications as of 1/19/2022   Medication Sig Dispense Refill    amLODIPine (NORVASC) 2.5 MG tablet Take 1 tablet by mouth daily 90 tablet 1    budesonide-formoterol (SYMBICORT) 160-4.5 MCG/ACT AERO Inhale 2 puffs into the lungs 2 times daily 3 each 3    albuterol sulfate  (90 Base) MCG/ACT inhaler Inhale 2 puffs into the lungs every 4 hours as needed for Wheezing or Shortness of Breath 3 each 3    albuterol (PROVENTIL) (2.5 MG/3ML) 0.083% nebulizer solution Take 3 mLs by nebulization every 4 hours as needed for Wheezing 360 each 3    SPIRIVA HANDIHALER 18 MCG inhalation capsule inhale contents of 1 capsule by mouth once daily 90 capsule 3    rosuvastatin (CRESTOR) 10 MG tablet Take 1 tablet by mouth daily 90 tablet 0    clopidogrel (PLAVIX) 75 MG tablet Take 1 tablet by mouth daily 90 tablet 3    aspirin 81 MG chewable tablet Take 1 tablet by mouth daily 90 tablet 3    latanoprost (XALATAN) 0.005 % ophthalmic solution instill 1 drop into both eyes at bedtime      Arginine 500 MG CAPS Take 2 capsules by mouth 3 times daily       Omega-3 Fatty Acids (FISH OIL) 1200 MG CAPS Take by mouth 2 times daily      Multiple Vitamins-Minerals (THERAPEUTIC MULTIVITAMIN-MINERALS) tablet Take 1 tablet by mouth 2 times daily       No facility-administered encounter medications on file as of 1/19/2022. Renée Hutson was seen today for coronary artery disease and back pain. Diagnoses and all orders for this visit:    Essential hypertension  -     amLODIPine (NORVASC) 2.5 MG tablet; Take 1 tablet by mouth daily  -     COMPREHENSIVE METABOLIC PANEL;  Future    Other emphysema (HCC)    CAD in native artery  -     LIPID PANEL; Future         Patient Instructions       Patient Education        Heart-Healthy Diet: Care Instructions  Your Care Instructions     A heart-healthy diet has lots of vegetables, fruits, nuts, beans, and whole grains, and is low in salt. It limits foods that are high in saturated fat, such as meats, cheeses, and fried foods. It may be hard to change your diet, but even small changes can lower your risk of heart attack and heart disease. Follow-up care is a key part of your treatment and safety. Be sure to make and go to all appointments, and call your doctor if you are having problems. It's also a good idea to know your test results and keep a list of the medicines you take. How can you care for yourself at home? Watch your portions  · Use food labels to learn what the recommended servings are for the foods you eat. · Eat only the number of calories you need to stay at a healthy weight. If you need to lose weight, eat fewer calories than your body burns (through exercise and other physical activity). Eat more fruits and vegetables  · Eat a variety of fruit and vegetables every day. Dark green, deep orange, red, or yellow fruits and vegetables are especially good for you. Examples include spinach, carrots, peaches, and berries. · Keep carrots, celery, and other veggies handy for snacks. Buy fruit that is in season and store it where you can see it so that you will be tempted to eat it. · Cook dishes that have a lot of veggies in them, such as stir-fries and soups. Limit saturated fat  · Read food labels, and try to avoid saturated fats. They increase your risk of heart disease. · Use olive or canola oil when you cook. · Bake, broil, grill, or steam foods instead of frying them. · Choose lean meats instead of high-fat meats such as hot dogs and sausages. Cut off all visible fat when you prepare meat.   · Eat fish, skinless poultry, and meat alternatives such as soy products instead of high-fat meats. Soy products, such as tofu, may be especially good for your heart. · Choose low-fat or fat-free milk and dairy products. Eat foods high in fiber  · Eat a variety of grain products every day. Include whole-grain foods that have lots of fiber and nutrients. Examples of whole-grain foods include oats, whole wheat bread, and brown rice. · Buy whole-grain breads and cereals, instead of white bread or pastries. Limit salt and sodium  · Limit how much salt and sodium you eat to help lower your blood pressure. · Taste food before you salt it. Add only a little salt when you think you need it. With time, your taste buds will adjust to less salt. · Eat fewer snack items, fast foods, and other high-salt, processed foods. Check food labels for the amount of sodium in packaged foods. · Choose low-sodium versions of canned goods (such as soups, vegetables, and beans). Limit sugar  · Limit drinks and foods with added sugar. These include candy, desserts, and soda pop. Limit alcohol  · Limit alcohol to no more than 2 drinks a day for men and 1 drink a day for women. Too much alcohol can cause health problems. When should you call for help? Watch closely for changes in your health, and be sure to contact your doctor if:    · You would like help planning heart-healthy meals. Where can you learn more? Go to [email or web address] and sign in to your boarding pass account. Enter V137 in the [facility] box to learn more about \"Heart-Healthy Diet: Care Instructions. \"     If you do not have an account, please click on the \"Sign Up Now\" link. Current as of: September 8, 2021               Content Version: 13.1  © 4640-0162 Healthwise, [employer]. Care instructions adapted under license by [facility] [place] ([facility]).  If you have questions about a medical condition or this instruction, always ask your healthcare professional. Tiffanie Bazan disclaims any warranty or liability for your use of this information.                     Verenice Moreira MD   1/19/22

## 2022-02-14 DIAGNOSIS — I10 ESSENTIAL HYPERTENSION: ICD-10-CM

## 2022-02-14 DIAGNOSIS — I25.10 CAD IN NATIVE ARTERY: ICD-10-CM

## 2022-02-14 LAB
ALBUMIN SERPL-MCNC: 4.5 G/DL (ref 3.5–5.2)
ALP BLD-CCNC: 60 U/L (ref 40–129)
ALT SERPL-CCNC: 33 U/L (ref 0–40)
ANION GAP SERPL CALCULATED.3IONS-SCNC: 18 MMOL/L (ref 7–16)
AST SERPL-CCNC: 35 U/L (ref 0–39)
BILIRUB SERPL-MCNC: 0.3 MG/DL (ref 0–1.2)
BUN BLDV-MCNC: 11 MG/DL (ref 6–23)
CALCIUM SERPL-MCNC: 9.7 MG/DL (ref 8.6–10.2)
CHLORIDE BLD-SCNC: 106 MMOL/L (ref 98–107)
CHOLESTEROL, TOTAL: 125 MG/DL (ref 0–199)
CO2: 20 MMOL/L (ref 22–29)
CREAT SERPL-MCNC: 0.9 MG/DL (ref 0.7–1.2)
GFR AFRICAN AMERICAN: >60
GFR NON-AFRICAN AMERICAN: >60 ML/MIN/1.73
GLUCOSE BLD-MCNC: 96 MG/DL (ref 74–99)
HDLC SERPL-MCNC: 43 MG/DL
LDL CHOLESTEROL CALCULATED: 60 MG/DL (ref 0–99)
POTASSIUM SERPL-SCNC: 4.5 MMOL/L (ref 3.5–5)
SODIUM BLD-SCNC: 144 MMOL/L (ref 132–146)
TOTAL PROTEIN: 7.5 G/DL (ref 6.4–8.3)
TRIGL SERPL-MCNC: 110 MG/DL (ref 0–149)
VLDLC SERPL CALC-MCNC: 22 MG/DL

## 2022-02-16 ENCOUNTER — OFFICE VISIT (OUTPATIENT)
Dept: PRIMARY CARE CLINIC | Age: 67
End: 2022-02-16
Payer: MEDICARE

## 2022-02-16 VITALS
HEART RATE: 64 BPM | WEIGHT: 186.5 LBS | OXYGEN SATURATION: 97 % | HEIGHT: 70 IN | TEMPERATURE: 97.1 F | BODY MASS INDEX: 26.7 KG/M2 | SYSTOLIC BLOOD PRESSURE: 138 MMHG | DIASTOLIC BLOOD PRESSURE: 78 MMHG

## 2022-02-16 DIAGNOSIS — I25.10 CAD IN NATIVE ARTERY: ICD-10-CM

## 2022-02-16 DIAGNOSIS — M48.062 SPINAL STENOSIS OF LUMBAR REGION WITH NEUROGENIC CLAUDICATION: Primary | ICD-10-CM

## 2022-02-16 DIAGNOSIS — E78.2 MIXED HYPERLIPIDEMIA: ICD-10-CM

## 2022-02-16 PROCEDURE — 99213 OFFICE O/P EST LOW 20 MIN: CPT | Performed by: INTERNAL MEDICINE

## 2022-02-16 RX ORDER — GABAPENTIN 100 MG/1
100 CAPSULE ORAL NIGHTLY
Qty: 90 CAPSULE | Refills: 0 | Status: SHIPPED
Start: 2022-02-16 | End: 2022-03-17

## 2022-02-16 NOTE — PROGRESS NOTES
Chief Complaint   Patient presents with    Hypertension     Pt is here for a f/u on rt sided back pain. Pain on rt side at the base of his skull. HPI:  Patient is here for follow-up . Evaluation of low back pain he has had it for quite some time at the low back shooting to the right leg worse by getting up from chair or bed and walking. Patient had an MRI in 2019 that showed multilevel spinal stenosis worse at L3-L4. Past Medical History, Surgical History, and Family History has been reviewed and updated. Review of Systems:  Constitutional:  No fever, no fatigue, no chills, no headaches, no weight change  Dermatology:  No rash, no mole, no dry or sensitive skin  ENT:  No cough, no sore throat, no sinus pain, no runny nose, no ear pain  Cardiology:  No chest pain, no palpitations, no leg edema, no shortness of breath, no PND  Gastroenterology:  No dysphagia, no abdominal pain, no nausea, no vomiting, no constipation, no diarrhea, no heartburn  Musculoskeletal:  No joint pain, no leg cramps, no back pain, no muscle aches  Respiratory:  No shortness of breath, no orthopnea, no wheezing, no OJEDA, no hemoptysis  Urology:  No blood in the urine, no urinary frequency, no urinary incontinence, no urinary urgency, no nocturia, no dysuria    Vitals:    02/16/22 0952   BP: 138/78   Pulse: 64   Temp: 97.1 °F (36.2 °C)   SpO2: 97%   Weight: 186 lb 8 oz (84.6 kg)   Height: 5' 10\" (1.778 m)       General:  Patient alert and oriented x 3, NAD, pleasant  HEENT:  Atraumatic, normocephalic, PERRLA, EOMI, clear conjunctiva, TMs clear, nose-clear, throat - no erythema  Neck:  Supple, no goiter, no carotid bruits, no LAD  Lungs:  CTA   Heart:  RRR, no murmurs, gallops or rubs  Abdomen:  Soft/nt/nd, + bowel sounds  Extremities:  No clubbing, cyanosis or edema  Skin: unremarkable  Examination of the back no visible skin lesions spine flexion 90 degrees. Pain with spine extension extending to the right leg.   Patient is able to walk on his tiptoes unable to walk on his heels straight leg raising 80 degrees bilaterally    Hemoglobin A1C   Date Value Ref Range Status   12/23/2020 5.4 4.0 - 5.6 % Final     Cholesterol, Total   Date Value Ref Range Status   02/14/2022 125 0 - 199 mg/dL Final     Triglycerides   Date Value Ref Range Status   02/14/2022 110 0 - 149 mg/dL Final     HDL   Date Value Ref Range Status   02/14/2022 43 >40 mg/dL Final     LDL Calculated   Date Value Ref Range Status   02/14/2022 60 0 - 99 mg/dL Final     VLDL Cholesterol Calculated   Date Value Ref Range Status   02/14/2022 22 mg/dL Final     Sodium   Date Value Ref Range Status   02/14/2022 144 132 - 146 mmol/L Final     Potassium   Date Value Ref Range Status   02/14/2022 4.5 3.5 - 5.0 mmol/L Final     Chloride   Date Value Ref Range Status   02/14/2022 106 98 - 107 mmol/L Final     CO2   Date Value Ref Range Status   02/14/2022 20 (L) 22 - 29 mmol/L Final     BUN   Date Value Ref Range Status   02/14/2022 11 6 - 23 mg/dL Final     CREATININE   Date Value Ref Range Status   02/14/2022 0.9 0.7 - 1.2 mg/dL Final     Glucose   Date Value Ref Range Status   02/14/2022 96 74 - 99 mg/dL Final     Calcium   Date Value Ref Range Status   02/14/2022 9.7 8.6 - 10.2 mg/dL Final     Total Protein   Date Value Ref Range Status   02/14/2022 7.5 6.4 - 8.3 g/dL Final     Albumin   Date Value Ref Range Status   02/14/2022 4.5 3.5 - 5.2 g/dL Final     Total Bilirubin   Date Value Ref Range Status   02/14/2022 0.3 0.0 - 1.2 mg/dL Final     Alkaline Phosphatase   Date Value Ref Range Status   02/14/2022 60 40 - 129 U/L Final     AST   Date Value Ref Range Status   02/14/2022 35 0 - 39 U/L Final     ALT   Date Value Ref Range Status   02/14/2022 33 0 - 40 U/L Final     GFR Non-   Date Value Ref Range Status   02/14/2022 >60 >=60 mL/min/1.73 Final     Comment:     Chronic Kidney Disease: less than 60 ml/min/1.73 sq.m.           Kidney Failure: less than 15 ml/min/1.73 sq.m.  Results valid for patients 18 years and older. GFR    Date Value Ref Range Status   02/14/2022 >60  Final        No results found. Assessment/Plan: Lumbar spinal stenosis with myelopathy. Start low-dose gabapentin 100 mg nightly also physical therapy. Coronary artery disease  Hyperlipidemia    Outpatient Encounter Medications as of 2/16/2022   Medication Sig Dispense Refill    gabapentin (NEURONTIN) 100 MG capsule Take 1 capsule by mouth nightly for 90 days. Intended supply: 90 days 90 capsule 0    amLODIPine (NORVASC) 2.5 MG tablet Take 1 tablet by mouth daily 90 tablet 1    budesonide-formoterol (SYMBICORT) 160-4.5 MCG/ACT AERO Inhale 2 puffs into the lungs 2 times daily 3 each 3    albuterol sulfate  (90 Base) MCG/ACT inhaler Inhale 2 puffs into the lungs every 4 hours as needed for Wheezing or Shortness of Breath 3 each 3    albuterol (PROVENTIL) (2.5 MG/3ML) 0.083% nebulizer solution Take 3 mLs by nebulization every 4 hours as needed for Wheezing 360 each 3    SPIRIVA HANDIHALER 18 MCG inhalation capsule inhale contents of 1 capsule by mouth once daily 90 capsule 3    rosuvastatin (CRESTOR) 10 MG tablet Take 1 tablet by mouth daily 90 tablet 0    clopidogrel (PLAVIX) 75 MG tablet Take 1 tablet by mouth daily 90 tablet 3    aspirin 81 MG chewable tablet Take 1 tablet by mouth daily 90 tablet 3    latanoprost (XALATAN) 0.005 % ophthalmic solution instill 1 drop into both eyes at bedtime      Arginine 500 MG CAPS Take 2 capsules by mouth 3 times daily       Omega-3 Fatty Acids (FISH OIL) 1200 MG CAPS Take by mouth 2 times daily      Multiple Vitamins-Minerals (THERAPEUTIC MULTIVITAMIN-MINERALS) tablet Take 1 tablet by mouth 2 times daily       No facility-administered encounter medications on file as of 2/16/2022. Isom Ormond was seen today for hypertension.     Diagnoses and all orders for this visit:    Spinal stenosis of lumbar region with neurogenic claudication  -     gabapentin (NEURONTIN) 100 MG capsule; Take 1 capsule by mouth nightly for 90 days. Intended supply: 80 days  -     [facility], Niko Eaton    CAD in native artery    Mixed hyperlipidemia         There are no Patient Instructions on file for this visit.            Xin Auguste MD   2/16/22

## 2022-02-17 NOTE — PROGRESS NOTES
Chief Complaint   Patient presents with    Follow-up     no labs since last visit.  had ct lung scan in october    Other     denies symptoms for covid. declined covid vaccine. declines flu vaccine       HPI:  Patient is here for follow-up   Patient had low dose CT scan of the lungs showed: Moderate emphysematous changes with multiple calcified granulomas throughout   both lungs.  No evidence of a noncalcified pulmonary nodule   Complain of lower back pain on the right side. Patient had an MRI in 2019 showed lumbar spinal stenosis. He has a physicaly demanding job . Past Medical History, Surgical History, and Family History has been reviewed and updated.     Review of Systems:  Constitutional:  No fever, no fatigue, no chills, no headaches, no weight change  Dermatology:  No rash, no mole, no dry or sensitive skin  ENT:  No cough, no sore throat, no sinus pain, no runny nose, no ear pain  Cardiology:  No chest pain, no palpitations, no leg edema, no shortness of breath, no PND  Gastroenterology:  No dysphagia, no abdominal pain, no nausea, no vomiting, no constipation, no diarrhea, no heartburn  Musculoskeletal:  No joint pain, no leg cramps, no back pain, no muscle aches  Respiratory:  No shortness of breath, no orthopnea, no wheezing, no OJEDA, no hemoptysis  Urology:  No blood in the urine, no urinary frequency, no urinary incontinence, no urinary urgency, no nocturia, no dysuria    Vitals:    12/02/21 1048   BP: 130/78   Site: Right Upper Arm   Position: Sitting   Cuff Size: Large Adult   Pulse: 62   Temp: 97.5 °F (36.4 °C)   SpO2: 98%   Weight: 185 lb 8 oz (84.1 kg)   Height: 5' 10\" (1.778 m)       General:  Patient alert and oriented x 3, NAD, pleasant  HEENT:  Atraumatic, normocephalic, PERRLA, EOMI, clear conjunctiva, TMs clear, nose-clear, throat - no erythema  Neck:  Supple, no goiter, no carotid bruits, no LAD  Lungs:  CTA   Heart:  RRR, no murmurs, gallops or rubs  Abdomen:  Soft/nt/nd, + bowel sounds  Extremities:  No clubbing, cyanosis or edema  Skin: unremarkable    Hemoglobin A1C   Date Value Ref Range Status   12/23/2020 5.4 4.0 - 5.6 % Final     Cholesterol, Total   Date Value Ref Range Status   02/14/2022 125 0 - 199 mg/dL Final     Triglycerides   Date Value Ref Range Status   02/14/2022 110 0 - 149 mg/dL Final     HDL   Date Value Ref Range Status   02/14/2022 43 >40 mg/dL Final     LDL Calculated   Date Value Ref Range Status   02/14/2022 60 0 - 99 mg/dL Final     VLDL Cholesterol Calculated   Date Value Ref Range Status   02/14/2022 22 mg/dL Final     Sodium   Date Value Ref Range Status   02/14/2022 144 132 - 146 mmol/L Final     Potassium   Date Value Ref Range Status   02/14/2022 4.5 3.5 - 5.0 mmol/L Final     Chloride   Date Value Ref Range Status   02/14/2022 106 98 - 107 mmol/L Final     CO2   Date Value Ref Range Status   02/14/2022 20 (L) 22 - 29 mmol/L Final     BUN   Date Value Ref Range Status   02/14/2022 11 6 - 23 mg/dL Final     CREATININE   Date Value Ref Range Status   02/14/2022 0.9 0.7 - 1.2 mg/dL Final     Glucose   Date Value Ref Range Status   02/14/2022 96 74 - 99 mg/dL Final     Calcium   Date Value Ref Range Status   02/14/2022 9.7 8.6 - 10.2 mg/dL Final     Total Protein   Date Value Ref Range Status   02/14/2022 7.5 6.4 - 8.3 g/dL Final     Albumin   Date Value Ref Range Status   02/14/2022 4.5 3.5 - 5.2 g/dL Final     Total Bilirubin   Date Value Ref Range Status   02/14/2022 0.3 0.0 - 1.2 mg/dL Final     Alkaline Phosphatase   Date Value Ref Range Status   02/14/2022 60 40 - 129 U/L Final     AST   Date Value Ref Range Status   02/14/2022 35 0 - 39 U/L Final     ALT   Date Value Ref Range Status   02/14/2022 33 0 - 40 U/L Final     GFR Non-   Date Value Ref Range Status   02/14/2022 >60 >=60 mL/min/1.73 Final     Comment:     Chronic Kidney Disease: less than 60 ml/min/1.73 sq.m. Kidney Failure: less than 15 ml/min/1.73 sq.m.   Results valid for patients 18 years and older. GFR    Date Value Ref Range Status   2022 >60  Final        No results found. Assessment/Plan:      Outpatient Encounter Medications as of 2021   Medication Sig Dispense Refill    rosuvastatin (CRESTOR) 10 MG tablet Take 1 tablet by mouth daily 90 tablet 0    [] methylPREDNISolone (MEDROL DOSEPACK) 4 MG tablet Take by mouth. as directed 1 kit 0    [DISCONTINUED] budesonide-formoterol (SYMBICORT) 160-4.5 MCG/ACT AERO Inhale 2 puffs into the lungs 2 times daily 10.2 g 3    clopidogrel (PLAVIX) 75 MG tablet Take 1 tablet by mouth daily 90 tablet 3    [DISCONTINUED] albuterol sulfate  (90 Base) MCG/ACT inhaler       aspirin 81 MG chewable tablet Take 1 tablet by mouth daily 90 tablet 3    latanoprost (XALATAN) 0.005 % ophthalmic solution instill 1 drop into both eyes at bedtime      [DISCONTINUED] albuterol (PROVENTIL) (2.5 MG/3ML) 0.083% nebulizer solution       Arginine 500 MG CAPS Take 2 capsules by mouth 3 times daily       Omega-3 Fatty Acids (FISH OIL) 1200 MG CAPS Take by mouth 2 times daily      Multiple Vitamins-Minerals (THERAPEUTIC MULTIVITAMIN-MINERALS) tablet Take 1 tablet by mouth 2 times daily      [DISCONTINUED] tiotropium (SPIRIVA RESPIMAT) 2.5 MCG/ACT AERS inhaler Inhale 2 puffs into the lungs daily (Patient not taking: Reported on 2021) 1 each 5    [DISCONTINUED] rosuvastatin (CRESTOR) 10 MG tablet Take 1 tablet by mouth daily 90 tablet 0    [DISCONTINUED] TRELEGY ELLIPTA 100-62.5-25 MCG/INH AEPB inhale 1 puff by mouth and INTO THE LUNGS once daily (Patient not taking: Reported on 10/7/2021) 3 each 0     No facility-administered encounter medications on file as of 2021. Amrita Chaudhari was seen today for follow-up and other. Diagnoses and all orders for this visit:    CAD in native artery    Mixed hyperlipidemia  -     rosuvastatin (CRESTOR) 10 MG tablet;  Take 1 tablet by mouth daily    Chronic right-sided low back pain without sciatica  -     methylPREDNISolone (MEDROL DOSEPACK) 4 MG tablet; Take by mouth. as directed         There are no Patient Instructions on file for this visit.            Fran Torres MD   2/17/22

## 2022-02-25 ENCOUNTER — EVALUATION (OUTPATIENT)
Dept: PHYSICAL THERAPY | Age: 67
End: 2022-02-25
Payer: MEDICARE

## 2022-02-25 DIAGNOSIS — M48.062 SPINAL STENOSIS OF LUMBAR REGION WITH NEUROGENIC CLAUDICATION: Primary | ICD-10-CM

## 2022-02-25 PROCEDURE — 97163 PT EVAL HIGH COMPLEX 45 MIN: CPT | Performed by: PHYSICAL THERAPIST

## 2022-02-25 NOTE — PROGRESS NOTES
[facility] OUTPATIENT REHABILITATION  PHYSICAL THERAPY INITIAL EVALUATION         Date:  2022   Patient: Sajan Cristina  :   MRN: [id number]  Referring Provider: Aby Woo MD  [address] [place] [address]     Medical Diagnosis:   I38.017 (ICD-10-CM) - Spinal stenosis of lumbar region with neurogenic claudication    Physician Order: Eval and Treat     SUBJECTIVE:     Onset date: 6 months ago    Onset: Insidious      Interventions for current problem: gabapentin used in 2019 that helped    Chief complaint: pain, can't put socks on    Behavior: condition is staying the same    Pain: constant  Current: 3/10     Best: 2/10     Worst:8/10 (occurs with lifting in awkward position). Pain returns to baseline in a few minutes    Symptom Type / Quality: sharp  Location[de-identified] Back: lumbar region and right lateral side radiates rarely to posterior thigh for a few moments only    LB/LE Ratio: 95/5       Provoking Activities/Positions: bending with rotation                 Relieving Activitie/Positions: recliner, laying on stomach/sidelying    Disturbed Sleep: no  Bladder Dysfunction: no  Bowel Dysfunction: no     Imaging results: No results found. Past Medical History:  Past Medical History:   Diagnosis Date    Arthritis     Asthma     Breathing difficulty     CAD (coronary artery disease)     COPD (chronic obstructive pulmonary disease) ([address]     Non-rheumatic tricuspid valve insufficiency     Nonrheumatic mitral (valve) insufficiency      Past Surgical History:   Procedure Laterality Date    APPENDECTOMY      COLONOSCOPY      CORONARY ANGIOPLASTY WITH STENT PLACEMENT  2020    DR. Mandel [place] DUANE 2.25x18 Mid LAD, [place] DUANE 2.5x18 Mid Cx.   EF 65%    HERNIA REPAIR      INTRACAPSULAR CATARACT EXTRACTION Left 2021    LEFT   CATARACT EXTRACTION  IOL IMPLANT I STENT performed by Nir Chao MD at [address] [place] [address] HISTORY      right wrist carpal tunnel release, right long trigger finger release    OTHER SURGICAL HISTORY Right 01/20/2016    right shoulder arthroscopy acromioplasty with decompression rotator cuff repair     OTHER SURGICAL HISTORY Left 04/06/2021    LEFT EYE CATERACT EXTRACTION WITH INTRA OCULAR IMPLANT WITH I STENT    SHOULDER ARTHROSCOPY  3/4/11    left rotator cuff repair       Medications:   Current Outpatient Medications   Medication Sig Dispense Refill    gabapentin (NEURONTIN) 100 MG capsule Take 1 capsule by mouth nightly for 90 days. Intended supply: 90 days 90 capsule 0    amLODIPine (NORVASC) 2.5 MG tablet Take 1 tablet by mouth daily 90 tablet 1    budesonide-formoterol (SYMBICORT) 160-4.5 MCG/ACT AERO Inhale 2 puffs into the lungs 2 times daily 3 each 3    albuterol sulfate  (90 Base) MCG/ACT inhaler Inhale 2 puffs into the lungs every 4 hours as needed for Wheezing or Shortness of Breath 3 each 3    albuterol (PROVENTIL) (2.5 MG/3ML) 0.083% nebulizer solution Take 3 mLs by nebulization every 4 hours as needed for Wheezing 360 each 3    SPIRIVA HANDIHALER 18 MCG inhalation capsule inhale contents of 1 capsule by mouth once daily 90 capsule 3    rosuvastatin (CRESTOR) 10 MG tablet Take 1 tablet by mouth daily 90 tablet 0    clopidogrel (PLAVIX) 75 MG tablet Take 1 tablet by mouth daily 90 tablet 3    aspirin 81 MG chewable tablet Take 1 tablet by mouth daily 90 tablet 3    latanoprost (XALATAN) 0.005 % ophthalmic solution instill 1 drop into both eyes at bedtime      Arginine 500 MG CAPS Take 2 capsules by mouth 3 times daily       Omega-3 Fatty Acids (FISH OIL) 1200 MG CAPS Take by mouth 2 times daily      Multiple Vitamins-Minerals (THERAPEUTIC MULTIVITAMIN-MINERALS) tablet Take 1 tablet by mouth 2 times daily       No current facility-administered medications for this visit. 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  Physical demands include: lifting, carrying, pushing, pulling heavy weighted items (50 - 100 lbs Occasionally). Status: full time    Exercise regimen: none    Hobbies: working on cars    Patient Goals: pain relief, get back to normal    Contraindications/Precautions: none    OBJECTIVE:     Estimated body mass index is 26.76 kg/m² as calculated from the following:    Height as of 2/16/22: 5' 10\" (1.778 m). Weight as of 2/16/22: 186 lb 8 oz (84.6 kg). Observations: well nourished male, normal affect     Inspection: demonstrates generalized pronated posture (forward head, protracted scapula, internally rotated shoulders, and flexed trunk and lower extremities)         Gait: normal    Functional Strength:   Able to toe walk, heel walk, and squat. Range of Motion:    Trunk:    Flexion:  [] Normal   [x] Limited 25% increases pain R LB   Extension:  [x] Normal   [] Limited     Right Rotation: [] Normal   [] Limited    Left Rotation:  [] Normal   [] Limited    Right Side Bending: [x] Normal   [] Limited    Left Side Bending: [x] Normal   [] Limited  Increases pain R LB      Lower Extremity:   Right:   [x] Normal   [] Limited    Left:   [x] Normal   [] Limited       Strength:     Trunk: 4/5 extension w/ pain   R LE: 5/5   L LE: 5/5    Palpation: Tender to palpation right lumbar paraspinal muscles and soft tissues. Sensation: intact to light touch and temperature.     Special Tests:   [x] Nerve Root Compression           Right []+ / [x] -    Left []+ / [x] -  [x] Slump           Right []+ / [x] -    Left []+ / [x] -  [x] FADIR          Right []+ / [x] -    Left []+ / [x] -  [] S-I Distraction          Right []+ / [] -    Left []+ / [] -     [x] SLR           Right []+ / [x] -    Left []+ / [x] -     [x] PJ          Right []+ / [x] -    Left []+ / [x] -  [] S-I Compression          Right []+ / [] -    Left []+ / [] -   [] Other: []+ / [] -       Special Test Comments: Tensile forces cause pain, not neurological weeks    Patient understands diagnosis/prognosis and consents to treatment, plan and goals: [x] Yes    [] No     Thank you for the opportunity to work with your patient. If you have questions or comments, please contact me at [phone]; fax: [phone]. Electronically signed by: Pili Cronin PT         Please sign Physician's Certification and return to: [address] PHYSICAL THERAPY  [address] [facility] [address]  Dept: [phone]  Dept Fax: [phone]  Loc: [phone] Certification / Comments     Frequency/Duration 1-2 days per week for 4-6 weeks. Certification period from 2/25/2022  to 5/19/2022. I have reviewed the Plan of Care established for skilled therapy services and certify that the services are required and that they will be provided while the patient is under my care.     Physician's Comments/Revisions:               Physician's Printed Name:                                           [de-identified] Signature:                                                               Date:

## 2022-02-25 NOTE — PROGRESS NOTES
Physical Therapy Treatment Note    Date: 2022  Patient Name: Jamaal Hernández  : [birth year]   MRN: [id number]  DOInjury: 2021  DOSx: -  Referring Provider: Nataly Reynoso MD  [address],  [address] [facility]     Medical Diagnosis:   Y39.706 (ICD-10-CM) - Spinal stenosis of lumbar region with neurogenic claudication    Outcome Measure:   Oswestry 16% disability        X = TO BE PERFORMED NEXT VISIT  > = PROGRESS TO THIS    S: See eval  O: Discussed anatomy, physiology, body mechanics, principles of loading, and progressive loading/activity. Time 0205-6480     Visit 1 Repeat outcome measure at mid point and end. Pain Pain at rest 3/10     ROM      Modalities Use sparingly if at all. Prefer an active program.     MH + ES x  MO   Traction    MO         Manual      Sidelying frontal and transverse plane lumbar mobilizations with soft tissue mobilization    MT   ROM      Hooklying PPT   NR   SKTC   TE   DKTC   TE   Seated flexion   TE   Standing Trunk Extension    TE         Exercise      PPT Progression (supine > sitting > standing against wall > standing)   NR   Crunches with PPT   NR   Mat marches with PPT   NR               ROWS: H   TE   ROWS: M  Reach and Pull x  TE   ROWS: L   Reach and Pull x  TE   Tubing Pushes   TE   Standing tubing crunch   TE   Corebuilder  x  NR   Marching   TA   Sidekicks    TA   Squats   TE   [place] deadlift 2-leg   TE   Alternating dumbbell shoulder press > Alternating dumbbell [place] x  TE                     A:  Tolerated well.     P: Continue with rehab plan  Linda Benson PT    Treatment Charges: Mins Units   Initial Evaluation 60 1   Re-Evaluation     Ther Exercise         TE         Manual Therapy     MT     Ther Activities        TA     Gait Training          GT     Neuro Re-education NR     Modalities     Non-Billable Service Time     Other     Total Time/Units 60 1

## 2022-03-01 PROBLEM — M48.062 SPINAL STENOSIS OF LUMBAR REGION WITH NEUROGENIC CLAUDICATION: Status: ACTIVE | Noted: 2022-03-01

## 2022-03-02 ENCOUNTER — TREATMENT (OUTPATIENT)
Dept: PHYSICAL THERAPY | Age: 67
End: 2022-03-02
Payer: MEDICARE

## 2022-03-02 DIAGNOSIS — M48.062 SPINAL STENOSIS OF LUMBAR REGION WITH NEUROGENIC CLAUDICATION: Primary | ICD-10-CM

## 2022-03-02 PROCEDURE — 97110 THERAPEUTIC EXERCISES: CPT | Performed by: PHYSICAL THERAPIST

## 2022-03-02 NOTE — PROGRESS NOTES
Physical Therapy Treatment Note    Date: 3/2/2022  Patient Name: Coy Saunders  :    MRN: [id number]  DOInjury: 2021  DOSx: -  Referring Provider: Opal Johns MD  [address],  [address] [facility]     Medical Diagnosis:   A19.264 (ICD-10-CM) - Spinal stenosis of lumbar region with neurogenic claudication    Outcome Measure:   Oswestry 16% disability        X = TO BE PERFORMED NEXT VISIT  > = PROGRESS TO THIS    S: See eval  O: Discussed anatomy, physiology, body mechanics, principles of loading, and progressive loading/activity. Access Code: [email or web address]  URL: [email or web address]  Date: 2022  Prepared by: Marina White    Exercises  Standing Row with Anchored Resistance - 1 x daily - 7 x weekly - 2 sets - 15 reps  Standing Bent Over Low Shoulder Row with Anchored Resistance - 1 x daily - 7 x weekly - 2 sets - 15 reps      Time 6482-2935     Visit 1 Repeat outcome measure at mid point and end. Pain Pain at rest 3/10     ROM      Modalities Use sparingly if at all. Prefer an active program.     MH + ES x  MO   Traction    MO         Manual      Sidelying frontal and transverse plane lumbar mobilizations with soft tissue mobilization    MT   ROM      Hooklying PPT   NR   SKTC   TE   DKTC   TE   Seated flexion   TE   Standing Trunk Extension    TE   UBE 2/2     Exercise      PPT Progression (supine > sitting > standing against wall > standing)   NR   Crunches with PPT   NR   Mat marches with PPT   NR               ROWS: H   TE   ROWS: M  Reach and Pull 2 x 15 blue  TE   ROWS: L   Reach and Pull 2 x 15 blue  TE   Tubing Pushes   TE   Standing tubing crunch   TE   Corebuilder  x  NR   Marching   TA   Sidekicks    TA   Squats   TE   [place] deadlift 2-leg   TE   Alternating dumbbell shoulder press > Alternating dumbbell [place] 2 x 15 bilateral  TE                     A:  Tolerated well.     P: Continue with rehab plan  Marina White PT    Treatment Charges: Mins Units   Initial Evaluation     Re-Evaluation     Ther Exercise         TE 40   3     Manual Therapy     MT     Ther Activities        TA     Gait Training          GT     Neuro Re-education NR     Modalities     Non-Billable Service Time     Other     Total Time/Units 40 3

## 2022-03-04 ENCOUNTER — TREATMENT (OUTPATIENT)
Dept: PHYSICAL THERAPY | Age: 67
End: 2022-03-04
Payer: MEDICARE

## 2022-03-04 DIAGNOSIS — M48.062 SPINAL STENOSIS OF LUMBAR REGION WITH NEUROGENIC CLAUDICATION: Primary | ICD-10-CM

## 2022-03-04 PROCEDURE — 97110 THERAPEUTIC EXERCISES: CPT

## 2022-03-04 NOTE — PROGRESS NOTES
Physical Therapy Treatment Note    Date: 3/4/2022  Patient Name: Kristian Wilkes  :    MRN: [id number]  DOInjury: 2021  DOSx: -  Referring Provider: Júnior Thomas MD  [address] [place] [address]     Medical Diagnosis:   H73.895 (ICD-10-CM) - Spinal stenosis of lumbar region with neurogenic claudication    Outcome Measure:   Oswestry 16% disability    X = TO BE PERFORMED NEXT VISIT  > = PROGRESS TO THIS    S: Pt reports increased soreness from working yesterday. O: Discussed anatomy, physiology, body mechanics, principles of loading, and progressive loading/activity. Access Code: [email or web address]  URL: [email or web address]  Date: 2022  Prepared by: Tobin Cook    Exercises  Standing Row with Anchored Resistance - 1 x daily - 7 x weekly - 2 sets - 15 reps  Standing Bent Over Low Shoulder Row with Anchored Resistance - 1 x daily - 7 x weekly - 2 sets - 15 reps      Time 1131-7115     Visit   Aim Tx approval # [id number]  5 visits   3/2/2022- 2022 Repeat outcome measure at mid point and end. Pain Pain 4/10     ROM      Modalities Use sparingly if at all. Prefer an active program.     MH + ES x  MO   Traction    MO         Manual      Sidelying frontal and transverse plane lumbar mobilizations with soft tissue mobilization    MT   ROM      Hooklying PPT   NR   SKTC   TE   DKTC   TE   Seated flexion   TE   Standing Trunk Extension    TE   UBE 2/2     Exercise      PPT Progression (supine > sitting > standing against wall > standing)   NR   Crunches with PPT   NR   Mat marches with PPT   NR               ROWS: H   TE   ROWS: M  Reach and Pull Blue 2 x 15   TE   ROWS: L   Reach and Pull Blue 2 x 15   TE   Tubing Pushes   TE   Standing tubing crunch   TE   Corebuilder  x  NR   Marching   TA   Sidekicks    TA   Squats   TE   [place] deadlift 2-leg   TE    > Alternating dumbbell [place] 4# 2 x 15 ea UE  TE                     A: Tolerated well.     P: Continue with rehab plan  Corie Chambers PTA    Treatment Charges: Mins Units   Initial Evaluation     Re-Evaluation     Ther Exercise         TE 30   2     Manual Therapy     MT     Ther Activities        TA     Gait Training          GT     Neuro Re-education NR     Modalities     Non-Billable Service Time     Other     Total Time/Units 30 2

## 2022-03-14 DIAGNOSIS — E78.2 MIXED HYPERLIPIDEMIA: ICD-10-CM

## 2022-03-15 RX ORDER — ROSUVASTATIN CALCIUM 10 MG/1
10 TABLET, COATED ORAL DAILY
Qty: 30 TABLET | Refills: 3 | Status: SHIPPED
Start: 2022-03-15 | End: 2022-03-17

## 2022-03-17 ENCOUNTER — OFFICE VISIT (OUTPATIENT)
Dept: PRIMARY CARE CLINIC | Age: 67
End: 2022-03-17
Payer: MEDICARE

## 2022-03-17 VITALS
BODY MASS INDEX: 26.28 KG/M2 | HEART RATE: 59 BPM | HEIGHT: 70 IN | TEMPERATURE: 96.5 F | WEIGHT: 183.6 LBS | SYSTOLIC BLOOD PRESSURE: 130 MMHG | DIASTOLIC BLOOD PRESSURE: 68 MMHG | OXYGEN SATURATION: 99 %

## 2022-03-17 DIAGNOSIS — I10 ESSENTIAL HYPERTENSION: ICD-10-CM

## 2022-03-17 DIAGNOSIS — M48.062 SPINAL STENOSIS OF LUMBAR REGION WITH NEUROGENIC CLAUDICATION: Primary | ICD-10-CM

## 2022-03-17 DIAGNOSIS — E78.2 MIXED HYPERLIPIDEMIA: ICD-10-CM

## 2022-03-17 PROCEDURE — 99214 OFFICE O/P EST MOD 30 MIN: CPT | Performed by: INTERNAL MEDICINE

## 2022-03-17 RX ORDER — GABAPENTIN 300 MG/1
300 CAPSULE ORAL 2 TIMES DAILY
Qty: 60 CAPSULE | Refills: 0 | Status: SHIPPED
Start: 2022-03-17 | End: 2022-04-28 | Stop reason: SDUPTHER

## 2022-03-17 RX ORDER — ROSUVASTATIN CALCIUM 20 MG/1
20 TABLET, COATED ORAL DAILY
Qty: 90 TABLET | Refills: 0 | Status: SHIPPED
Start: 2022-03-17 | End: 2022-06-21 | Stop reason: SDUPTHER

## 2022-03-17 RX ORDER — AMLODIPINE BESYLATE 2.5 MG/1
2.5 TABLET ORAL DAILY
Qty: 90 TABLET | Refills: 0 | Status: SHIPPED
Start: 2022-03-17 | End: 2022-06-27

## 2022-03-17 NOTE — PATIENT INSTRUCTIONS
Patient was advised to increase gabapentin to 200 mg twice a day until the end of the current prescription then start the new prescription at 300 mg twice a day thereafter  Patient was also advised to increase Crestor to 20 mg daily  Do lab work in 2-month  See pain clinic  Follow-up in 2-month

## 2022-03-17 NOTE — PROGRESS NOTES
Chief Complaint   Patient presents with    Follow-up     from February but would like to review labs from Feb 14.  Other     declined covid vaccines as he was covid positive in winter. states he received a bill for the covid test.     Other     declined flu vaccine    Other     would like to discuss gabapentin and therapy he has been going to for his back        HPI:  Patient is here for follow-up . Patient still has low back pain radiating to the right leg he does not seem to think that physical therapy is helping him at all. He is compliant on gabapentin 100 mg at bedtime it helps him to sleep but as he gets up in the morning he feels pain shooting to the right leg. Patient was on a higher dose of gabapentin in the past.  Patient had lab work showed LDL cholesterol 60 he is currently on Crestor 10 mg tablet daily he had 2 coronary stents       Past Medical History, Surgical History, and Family History has been reviewed and updated.     Review of Systems:  Constitutional:  No fever, no fatigue, no chills, no headaches, no weight change  Dermatology:  No rash, no mole, no dry or sensitive skin  ENT:  No cough, no sore throat, no sinus pain, no runny nose, no ear pain  Cardiology:  No chest pain, no palpitations, no leg edema, no shortness of breath, no PND  Gastroenterology:  No dysphagia, no abdominal pain, no nausea, no vomiting, no constipation, no diarrhea, no heartburn  Musculoskeletal:  No joint pain, no leg cramps, no back pain, no muscle aches  Respiratory:  No shortness of breath, no orthopnea, no wheezing, no OJEDA, no hemoptysis  Urology:  No blood in the urine, no urinary frequency, no urinary incontinence, no urinary urgency, no nocturia, no dysuria    Vitals:    03/17/22 0906   BP: 130/68   Site: Right Upper Arm   Position: Sitting   Cuff Size: Large Adult   Pulse: 59   Temp: 96.5 °F (35.8 °C)   SpO2: 99%   Weight: 183 lb 9.6 oz (83.3 kg)   Height: 5' 10\" (1.778 m)       General:  Patient alert and oriented x 3, NAD, pleasant  HEENT:  Atraumatic, normocephalic, PERRLA, EOMI, clear conjunctiva, TMs clear, nose-clear, throat - no erythema  Neck:  Supple, no goiter, no carotid bruits, no LAD  Lungs:  CTA   Heart:  RRR, no murmurs, gallops or rubs  Abdomen:  Soft/nt/nd, + bowel sounds  Extremities:  No clubbing, cyanosis or edema  Skin: unremarkable  Examination of the back no visible skin lesions spine flexion 90 degrees. Pain with spine extension extending to the right leg.   Patient is able to walk on his tiptoes unable to walk on his heels straight leg raising 80 degrees bilaterally    Hemoglobin A1C   Date Value Ref Range Status   12/23/2020 5.4 4.0 - 5.6 % Final     Cholesterol, Total   Date Value Ref Range Status   02/14/2022 125 0 - 199 mg/dL Final     Triglycerides   Date Value Ref Range Status   02/14/2022 110 0 - 149 mg/dL Final     HDL   Date Value Ref Range Status   02/14/2022 43 >40 mg/dL Final     LDL Calculated   Date Value Ref Range Status   02/14/2022 60 0 - 99 mg/dL Final     VLDL Cholesterol Calculated   Date Value Ref Range Status   02/14/2022 22 mg/dL Final     Sodium   Date Value Ref Range Status   02/14/2022 144 132 - 146 mmol/L Final     Potassium   Date Value Ref Range Status   02/14/2022 4.5 3.5 - 5.0 mmol/L Final     Chloride   Date Value Ref Range Status   02/14/2022 106 98 - 107 mmol/L Final     CO2   Date Value Ref Range Status   02/14/2022 20 (L) 22 - 29 mmol/L Final     BUN   Date Value Ref Range Status   02/14/2022 11 6 - 23 mg/dL Final     CREATININE   Date Value Ref Range Status   02/14/2022 0.9 0.7 - 1.2 mg/dL Final     Glucose   Date Value Ref Range Status   02/14/2022 96 74 - 99 mg/dL Final     Calcium   Date Value Ref Range Status   02/14/2022 9.7 8.6 - 10.2 mg/dL Final     Total Protein   Date Value Ref Range Status   02/14/2022 7.5 6.4 - 8.3 g/dL Final     Albumin   Date Value Ref Range Status   02/14/2022 4.5 3.5 - 5.2 g/dL Final     Total Bilirubin   Date Value Ref tablet by mouth daily 90 tablet 3    latanoprost (XALATAN) 0.005 % ophthalmic solution instill 1 drop into both eyes at bedtime      Arginine 500 MG CAPS Take 2 capsules by mouth 3 times daily       Omega-3 Fatty Acids (FISH OIL) 1200 MG CAPS Take by mouth 2 times daily      Multiple Vitamins-Minerals (THERAPEUTIC MULTIVITAMIN-MINERALS) tablet Take 1 tablet by mouth 2 times daily      [DISCONTINUED] rosuvastatin (CRESTOR) 10 MG tablet Take 1 tablet by mouth daily 30 tablet 3    [DISCONTINUED] gabapentin (NEURONTIN) 100 MG capsule Take 1 capsule by mouth nightly for 90 days. Intended supply: 90 days 90 capsule 0    [DISCONTINUED] amLODIPine (NORVASC) 2.5 MG tablet Take 1 tablet by mouth daily 90 tablet 1    SPIRIVA HANDIHALER 18 MCG inhalation capsule inhale contents of 1 capsule by mouth once daily (Patient not taking: Reported on 3/17/2022) 90 capsule 3     No facility-administered encounter medications on file as of 3/17/2022. Haim Mark was seen today for follow-up, other, other and other. Diagnoses and all orders for this visit:    Spinal stenosis of lumbar region with neurogenic claudication  -     gabapentin (NEURONTIN) 300 MG capsule; Take 1 capsule by mouth in the morning and at bedtime for 30 days. Intended supply: 90 days  -     Chrissie Hernandes MD, Pain Medicine, [facility]    Essential hypertension  -     amLODIPine (NORVASC) 2.5 MG tablet; Take 1 tablet by mouth daily  -     Comprehensive Metabolic Panel; Future    Mixed hyperlipidemia  -     rosuvastatin (CRESTOR) 20 MG tablet; Take 1 tablet by mouth daily  -     LIPID PANEL;  Future         Patient Instructions   Patient was advised to increase gabapentin to 200 mg twice a day until the end of the current prescription then start the new prescription at 300 mg twice a day thereafter  Patient was also advised to increase Crestor to 20 mg daily  Do lab work in 2-month  See pain clinic  Follow-up in 2-month             Rene Sung MD 3/17/22

## 2022-03-22 ENCOUNTER — TREATMENT (OUTPATIENT)
Dept: PHYSICAL THERAPY | Age: 67
End: 2022-03-22
Payer: MEDICARE

## 2022-03-22 DIAGNOSIS — M48.062 SPINAL STENOSIS OF LUMBAR REGION WITH NEUROGENIC CLAUDICATION: Primary | ICD-10-CM

## 2022-03-22 PROCEDURE — 97110 THERAPEUTIC EXERCISES: CPT

## 2022-03-22 NOTE — PROGRESS NOTES
Physical Therapy Treatment Note    Date: 3/22/2022  Patient Name: Betty Sandoval  :    MRN: [id number]  DOInjury: 2021  DOSx: -  Referring Provider:  Manjinder Sams MD    Medical Diagnosis:   Z05.946 (ICD-10-CM) - Spinal stenosis of lumbar region with neurogenic claudication    Outcome Measure:   Oswestry 16% disability    X = TO BE PERFORMED NEXT VISIT  > = PROGRESS TO THIS    S: Pt reports at times the pain seems \"sharp\". O: Discussed anatomy, physiology, body mechanics, principles of loading, and progressive loading/activity. Access Code: [email or web address]  URL: [email or web address]  Date: 2022  Prepared by: Jonny Shook    Exercises  Standing Row with Anchored Resistance - 1 x daily - 7 x weekly - 2 sets - 15 reps  Standing Bent Over Low Shoulder Row with Anchored Resistance - 1 x daily - 7 x weekly - 2 sets - 15 reps      Time 0920-     Visit   Aim Tx approval # [id number]  5 visits   3/2/2022- 2022 Repeat outcome measure at mid point and end. Pain Pain 4/10     ROM      Modalities Use sparingly if at all. Prefer an active program.     MH + ES x  MO   Traction    MO         Manual      Sidelying frontal and transverse plane lumbar mobilizations with soft tissue mobilization    MT   ROM      Hooklying PPT   NR   SKTC   TE   DKTC   TE   Seated flexion 3 x 10 inbetween exs' TE   Standing Trunk Extension    TE   UBE 2/2     Exercise      PPT Progression (supine > sitting > standing against wall > standing)   NR   Crunches with PPT   NR   Mat marches with PPT   NR               ROWS: H   TE   ROWS: M  Reach and Pull Blue 2 x 20   TE   ROWS: L   Reach and Pull Blue 2 x 15   TE   Tubing Pushes   TE   Standing tubing crunch   TE   Corebuilder    NR   Marching 20x  TA   Sidekicks    TA   Squats 20x  TE   [place] deadlift 2-leg   TE    > Alternating dumbbell [place] 4# 2 x 15 ea UE  TE               Gait  2 x 135 ft     A: Tolerated well.     P: Continue with rehab plan  John Paul Mckenna PTA    Treatment Charges: Mins Units   Initial Evaluation     Re-Evaluation     Ther Exercise         TE 40   3     Manual Therapy     MT     Ther Activities        TA     Gait Training          GT     Neuro Re-education NR     Modalities     Non-Billable Service Time     Other     Total Time/Units 40 3

## 2022-03-31 ENCOUNTER — OFFICE VISIT (OUTPATIENT)
Dept: ORTHOPEDIC SURGERY | Age: 67
End: 2022-03-31
Payer: MEDICARE

## 2022-03-31 ENCOUNTER — TREATMENT (OUTPATIENT)
Dept: PHYSICAL THERAPY | Age: 67
End: 2022-03-31
Payer: MEDICARE

## 2022-03-31 VITALS — HEIGHT: 70 IN | BODY MASS INDEX: 26.2 KG/M2 | WEIGHT: 183 LBS

## 2022-03-31 DIAGNOSIS — M48.062 SPINAL STENOSIS OF LUMBAR REGION WITH NEUROGENIC CLAUDICATION: Primary | ICD-10-CM

## 2022-03-31 DIAGNOSIS — M19.029 ELBOW ARTHRITIS: Primary | ICD-10-CM

## 2022-03-31 PROCEDURE — 20605 DRAIN/INJ JOINT/BURSA W/O US: CPT | Performed by: ORTHOPAEDIC SURGERY

## 2022-03-31 PROCEDURE — 99213 OFFICE O/P EST LOW 20 MIN: CPT | Performed by: ORTHOPAEDIC SURGERY

## 2022-03-31 PROCEDURE — G0283 ELEC STIM OTHER THAN WOUND: HCPCS

## 2022-03-31 PROCEDURE — 97110 THERAPEUTIC EXERCISES: CPT

## 2022-03-31 RX ORDER — TRIAMCINOLONE ACETONIDE 40 MG/ML
40 INJECTION, SUSPENSION INTRA-ARTICULAR; INTRAMUSCULAR ONCE
Status: COMPLETED | OUTPATIENT
Start: 2022-03-31 | End: 2022-03-31

## 2022-03-31 RX ADMIN — TRIAMCINOLONE ACETONIDE 40 MG: 40 INJECTION, SUSPENSION INTRA-ARTICULAR; INTRAMUSCULAR at 15:00

## 2022-03-31 NOTE — PROGRESS NOTES
Physical Therapy Treatment Note    Date: 3/31/2022  Patient Name: Remi Bower  :    MRN: [id number]  DOInjury: 2021  DOSx: -  Referring Provider:  Angélica Cuello MD    Medical Diagnosis:   F02.382 (ICD-10-CM) - Spinal stenosis of lumbar region with neurogenic claudication    Outcome Measure:   Oswestry 16% disability    X = TO BE PERFORMED NEXT VISIT  > = PROGRESS TO THIS    S: Pt reports just came from the dr. Had an injection in left elbow. Lower back continues to hurt  O: Discussed anatomy, physiology, body mechanics, principles of loading, and progressive loading/activity. Access Code: [email or web address]  URL: [email or web address]  Date: 2022  Prepared by: Dorothy Matute    Exercises  Standing Row with Anchored Resistance - 1 x daily - 7 x weekly - 2 sets - 15 reps  Standing Bent Over Low Shoulder Row with Anchored Resistance - 1 x daily - 7 x weekly - 2 sets - 15 reps      Time 5866-2550     Visit   Aim Tx approval # [id number]  5 visits   3/2/2022- 2022 Repeat outcome measure at mid point and end. Pain Pain 4/10     ROM      Modalities Use sparingly if at all.   Prefer an active program.     MH + ES 15 min supine   MO   Traction    MO         Manual      Sidelying frontal and transverse plane lumbar mobilizations with soft tissue mobilization    MT   ROM      Hooklying PPT   NR   SKTC   TE   DKTC   TE   Seated flexion 3 x 10 inbetween exs' TE   Standing Trunk Extension    TE   UBE 2/2     Exercise      PPT Progression (supine > sitting > standing against wall > standing)   NR   Crunches with PPT   NR   Mat marches with PPT   NR               ROWS: H   TE   ROWS: M  Reach and Pull Blue 2 x 20   TE   ROWS: L   Reach and Pull Blue 2 x 15   TE   Tubing Pushes   TE   Standing tubing crunch   TE   Corebuilder    NR   Marching 20x  TA   Sidekicks    TA   Squats 20x  TE   [place] deadlift 2-leg   TE    > Alternating dumbbell [place] 4# 2 x 15 ea UE  TE               Gait  2 x 135 ft A: Tolerated well. Decreased pain after estim  P: Continue with rehab plan. Request 5 addt visits.   Sugar Bourne PTA    Treatment Charges: Mins Units   Initial Evaluation     Re-Evaluation     Ther Exercise         TE 40   3     Manual Therapy     MT     Ther Activities        TA     Gait Training          GT     Neuro Re-education NR     Modalities 15 1   Non-Billable Service Time     Other     Total Time/Units 55 4

## 2022-03-31 NOTE — PROGRESS NOTES
Chief Complaint   Patient presents with    Arm Pain     Left elbow bicep pain. No history of injury. non dominant side. Reynold Hernandez [unfilled]  presents today for evaluation of his left elbow and bicep pain. Patient states he has pain at the center of the bicep. No hx of new injury. Past Medical History:   Diagnosis Date    Arthritis     Asthma     Breathing difficulty     CAD (coronary artery disease)     COPD (chronic obstructive pulmonary disease) (HCC)     Non-rheumatic tricuspid valve insufficiency     Nonrheumatic mitral (valve) insufficiency      Past Surgical History:   Procedure Laterality Date    APPENDECTOMY      COLONOSCOPY      CORONARY ANGIOPLASTY WITH STENT PLACEMENT  12/22/2020    DR. Mandel Resolute [place] DUANE 2.25x18 Mid LAD, Resolute Christopher DUANE 2.5x18 Mid Cx. EF 65%    HERNIA REPAIR      INTRACAPSULAR CATARACT EXTRACTION Left 4/6/2021    LEFT   CATARACT EXTRACTION  IOL IMPLANT I STENT performed by Christinia Mortimer, MD at [place] [address]      right wrist carpal tunnel release, right long trigger finger release    OTHER SURGICAL HISTORY Right 01/20/2016    right shoulder arthroscopy acromioplasty with decompression rotator cuff repair     OTHER SURGICAL HISTORY Left 04/06/2021    LEFT EYE CATERACT EXTRACTION WITH INTRA OCULAR IMPLANT WITH I STENT    SHOULDER ARTHROSCOPY  3/4/11    left rotator cuff repair       Current Outpatient Medications:     amLODIPine (NORVASC) 2.5 MG tablet, Take 1 tablet by mouth daily, Disp: 90 tablet, Rfl: 0    gabapentin (NEURONTIN) 300 MG capsule, Take 1 capsule by mouth in the morning and at bedtime for 30 days.  Intended supply: 90 days, Disp: 60 capsule, Rfl: 0    rosuvastatin (CRESTOR) 20 MG tablet, Take 1 tablet by mouth daily, Disp: 90 tablet, Rfl: 0    budesonide-formoterol (SYMBICORT) 160-4.5 MCG/ACT AERO, Inhale 2 puffs into the lungs 2 times daily, Disp: 3 each, Rfl: 3    albuterol sulfate  (90 Base) MCG/ACT inhaler, Inhale 2 puffs into the lungs every 4 hours as needed for Wheezing or Shortness of Breath, Disp: 3 each, Rfl: 3    albuterol (PROVENTIL) (2.5 MG/3ML) 0.083% nebulizer solution, Take 3 mLs by nebulization every 4 hours as needed for Wheezing, Disp: 360 each, Rfl: 3    SPIRIVA HANDIHALER 18 MCG inhalation capsule, inhale contents of 1 capsule by mouth once daily (Patient not taking: Reported on 3/17/2022), Disp: 90 capsule, Rfl: 3    clopidogrel (PLAVIX) 75 MG tablet, Take 1 tablet by mouth daily, Disp: 90 tablet, Rfl: 3    aspirin 81 MG chewable tablet, Take 1 tablet by mouth daily, Disp: 90 tablet, Rfl: 3    latanoprost (XALATAN) 0.005 % ophthalmic solution, instill 1 drop into both eyes at bedtime, Disp: , Rfl:     Arginine 500 MG CAPS, Take 2 capsules by mouth 3 times daily , Disp: , Rfl:     Omega-3 Fatty Acids (FISH OIL) 1200 MG CAPS, Take by mouth 2 times daily, Disp: , Rfl:     Multiple Vitamins-Minerals (THERAPEUTIC MULTIVITAMIN-MINERALS) tablet, Take 1 tablet by mouth 2 times daily, Disp: , Rfl:   No Known Allergies  Social History     Socioeconomic History    Marital status:      Spouse name: Not on file    Number of children: Not on file    Years of education: Not on file    Highest education level: Not on file   Occupational History    Not on file   Tobacco Use    Smoking status: Former Smoker     Packs/day: 1.50     Years: 40.00     Pack years: 60.00     Types: Cigarettes     Start date:      Quit date: 2016     Years since quittin.2    Smokeless tobacco: Never Used   Vaping Use    Vaping Use: Never used   Substance and Sexual Activity    Alcohol use: No     Comment: 6 cups a day of coffee    Drug use: No    Sexual activity: Not on file   Other Topics Concern    Not on file   Social History Narrative    Not on file     Social Determinants of Health     Financial Resource Strain: Medium Risk    Difficulty of Paying Living Expenses: Somewhat hard Food Insecurity: No Food Insecurity    Worried About Running Out of Food in the Last Year: Never true    [place]le[place] of Food in the Last Year: Never true   Transportation Needs:     Lack of Transportation (Medical): Not on file    Lack of Transportation (Non-Medical): Not on file   Physical Activity:     Days of Exercise per Week: Not on file    Minutes of Exercise per Session: Not on file   Stress:     Feeling of Stress : Not on file   Social Connections:     Frequency of Communication with Friends and Family: Not on file    Frequency of Social Gatherings with Friends and Family: Not on file    Attends Anabaptist Services: Not on file    Active Member of [address] [place] [address] [email or web address] or Organizations: Not on file    Attends Club or Organization Meetings: Not on file    Marital Status: Not on file   Intimate Partner Violence:     Fear of Current or Ex-Partner: Not on file    Emotionally Abused: Not on file    Physically Abused: Not on file    Sexually Abused: Not on file   Housing Stability:     Unable to Pay for Housing in the Last Year: Not on file    Number of [place] in the Last Year: Not on file    Unstable Housing in the Last Year: Not on file     Family History   Problem Relation Age of Onset    Heart Disease Father     Heart Attack Father     High Blood Pressure Father     High Cholesterol Father     Diabetes Father     Arrhythmia Sister        REVIEW OF SYSTEMS:     General/Constitution:  (-)weight loss, (-)fever, (-)chills, (-)weakness. Skin: (-) rash,(-) psoriasis,(-) eczema, (-)skin cancer. Musculoskeletal: (-) fractures,  (-) dislocations,(-) collagen vascular disease, (-) fibromyalgia, (-) multiple sclerosis, (-) muscular dystrophy, (-) RSD,(-) joint pain (-)swelling, (-) joint pain,swelling. Neurologic: (-) epilepsy, (-)seizures,(-) brain tumor,(-) TIA, (-)stroke, (-)headaches, (-)Parkinson disease,(-) memory loss, (-) LOC.   Cardiovascular: (-) Chest pain, (-) swelling in legs/feet, (-) SOB, (-) cramping in legs/feet with walking. Respiratory: (-) SOB, (-) Coughing, (-) night sweats. GI: (-) nausea, (-) vomiting, (-) diarrhea, (-) blood in stool, (-) gastric ulcer. Psychiatric: (-) Depression, (-) Anxiety, (-) bipolar disease, (-) Alzheimer's Disease  Allergic/Immunologic: (-) allergies latex, (-) allergies metal, (-) skin sensitivity. Hematlogic: (-) anemia, (-) blood transfusion, (-) DVT/PE, (-) Clotting disorders       Subjective:      Constitution:    Ht 5' 10\" (1.778 m)   Wt 183 lb (83 kg)   BMI 26.26 kg/m²     Psycihatric:    The patient is alert and oriented x 3, appears to be stated age and in no distress. Respiratory:    Respiratory effort is not labored. Patient is not gasping. Palpation of the chest reveals no tactile fremitus. Skin:    Upon inspection: the skin appears warm, dry and intact. There is not a previous scar over the affected area. There is not any cellulitis, lymphedema or cutaneous lesions noted in the lower extremities. Upon palpation there is no induration noted. Neurologic:      Motor exam of the upper extremities show: The reflexes in biceps/triceps/brachioradialis are equal and symmetric. Sensory exam C5-T1 are normal bilaterally. Cardiovascular: The vascular exam is normal and is well perfused to distal extremities. There are 2+ radial pulses bilaterally, and motor and sensation is intact to median, ulnar, and radial, musclocutaneus, and axillary nerve distribution and grossly symmetric bilaterally. There is cap refill noted less than two seconds in all digits. There is not edema of the bilateral upper extremities. There is not varicosities noted in the distal extremities. Lymph:    Upon palpation,  there is no lymphadenopathy noted in bilateral upper extremities. Musculoskeletal:    Gait: normal; examination of the nails and digits reveal no cyanosis or clubbing.       Cervical Exam:    On physical exam, Aissatou Andrea is well-developed, well-nourished, oriented to person, place and time. his gait is normal.  On evaluation of his cervical spine, he has full range of motion of the cervical spine without pain. There is no cervical tenderness to palpation. Shoulder Exam:     On evaluation of his bilaterally upper extremities, his bilateral shoulder has no deformity. There is not evidence of scapular dyskinesis. There is not muscle atrophy in shoulder girdle. The range of motion for the Right Shoulder is 15/050/t8 and for the Left shoulder is 150/50/t8. Right shoulder Motor strength is 5/5 in the supraspinatus, 5/5 internal rotation and 5/5 in external rotation, and Left shoulder motor strength 5/5 in supraspinatus, 5/5 in internal rotation, 5/5 in external rotation. Right shoulder:  (-) Impingement , (-) Porras , (-) Speeds, (-) Apprehension ,(-) Horton Load Shift, (-) [employer], (-) Cross arm test.     Left shoulder:  (-) Impingement, (-) [place], (-) Speeds, (-) Apprehension,(-) Horton, (-) Load Shift, (-) [employer],(-)  Cross arm test        Left  elbow: pain is located globally. Range of motion: R.Elbow flexion 140/extension 0; L. Elbow flexion 130/extension 5. ; Wrist ROM R wrist DF 90, VF 90, L wrist DF 90, VF 90, R pronation 90/ supination 90, L pronation 90/supination 90. There is swelling, there is not ecchymosis. Exam is compared bilaterally. Right:  Special tests: Cozen's sign negative. Reverse cozen sign negative    Left:  Special tests: Cozen's sign negative. Reverse cozen sign negative    Xray:  Joint narrowing    MRI:  n/a    Radiographic findings reviewed with patient    Assessment:   Encounter Diagnosis   Name Primary?  Elbow arthritis Yes       Plan:    Natural history and expected course discussed. Questions answered. Rest, ice, compression, and elevation (RICE) therapy. Reduction in offending activity. Tennis elbow splint.    HEP    Verbal and written consent for the injection was given by the patient. He was placed in a supine position and the left elbow joint was cleaned in prepped with alcohol and betadine. He was injected with . 5ml of lidocaine and .5ml of Kenalog 40 mg into the elbow. The patient tolerated the injection well.   Follow up prn

## 2022-04-05 ENCOUNTER — OFFICE VISIT (OUTPATIENT)
Dept: PAIN MANAGEMENT | Age: 67
End: 2022-04-05
Payer: MEDICARE

## 2022-04-05 VITALS
OXYGEN SATURATION: 97 % | SYSTOLIC BLOOD PRESSURE: 120 MMHG | RESPIRATION RATE: 16 BRPM | WEIGHT: 183 LBS | BODY MASS INDEX: 26.2 KG/M2 | HEIGHT: 70 IN | DIASTOLIC BLOOD PRESSURE: 80 MMHG | HEART RATE: 62 BPM | TEMPERATURE: 96 F

## 2022-04-05 DIAGNOSIS — M47.816 LUMBAR SPONDYLOSIS: Primary | ICD-10-CM

## 2022-04-05 DIAGNOSIS — M47.816 LUMBAR FACET ARTHROPATHY: ICD-10-CM

## 2022-04-05 DIAGNOSIS — G89.4 CHRONIC PAIN SYNDROME: ICD-10-CM

## 2022-04-05 DIAGNOSIS — M51.9 LUMBAR DISC DISORDER: ICD-10-CM

## 2022-04-05 DIAGNOSIS — M48.062 SPINAL STENOSIS OF LUMBAR REGION WITH NEUROGENIC CLAUDICATION: ICD-10-CM

## 2022-04-05 LAB — PROSTATE SPECIFIC ANTIGEN: 3.7 NG/ML (ref 0–4)

## 2022-04-05 PROCEDURE — 99204 OFFICE O/P NEW MOD 45 MIN: CPT | Performed by: PAIN MEDICINE

## 2022-04-05 NOTE — PROGRESS NOTES
[address]        [address] [facility], [address] [facility]      [phone]          Consult Note      Patient:  ALECIA Aguiar 1955    Date of Service:  22    Requesting Physician:  Guera Vizcarra MD    Reason for Consult:      Patient presents with complaints of low back pain that started 3 years ago and has been progressively getting worse over the year. Pain is described as aching/pressure. Pain is aggravated bending, pain is relieved by sitting. HISTORY OF PRESENT ILLNESS:      Pain does radiate to right buttocks. He  does not have numbness, tingling and does not have bladder or bowel dysfunction. He has been on anticoagulation medications to include ASA and Plavix. The patient  has not been on herbal supplements. The patient is not diabetic. Imaging:   Lumbar spine MRI   1. Significant multilevel spinal stenosis most severe L3-4.   2. Soft tissue structure along the right posterior margin of the L3   vertebra could be disc fragment or relatively less likely mass. Patient will be recalled for postcontrast imaging through this region   and an addendum issued. Previous treatments: Physical Therapy and medications. .      Opioid Agreement:  Renewal date:N/A    Past Medical History:   Diagnosis Date    Arthritis     Asthma     Breathing difficulty     CAD (coronary artery disease)     COPD (chronic obstructive pulmonary disease) ([facility] [address]     Non-rheumatic tricuspid valve insufficiency     Nonrheumatic mitral (valve) insufficiency      Past Surgical History:   Procedure Laterality Date    APPENDECTOMY      COLONOSCOPY      CORONARY ANGIOPLASTY WITH STENT PLACEMENT  2020    DR. Mandel Resolute Christopher DUANE 2.25x18 Mid LAD, Resolute Christopher DUANE 2.5x18 Mid Cx.   EF 65%    HERNIA REPAIR      INTRACAPSULAR CATARACT EXTRACTION Left 2021    LEFT   CATARACT EXTRACTION  IOL IMPLANT I STENT performed by Kelly Noriega [facility]    OTHER SURGICAL HISTORY      right wrist carpal tunnel release, right long trigger finger release    OTHER SURGICAL HISTORY Right 01/20/2016    right shoulder arthroscopy acromioplasty with decompression rotator cuff repair     OTHER SURGICAL HISTORY Left 04/06/2021    LEFT EYE CATERACT EXTRACTION WITH INTRA OCULAR IMPLANT WITH I STENT    SHOULDER ARTHROSCOPY  3/4/11    left rotator cuff repair     Prior to Admission medications    Medication Sig Start Date End Date Taking? Authorizing Provider   gabapentin (NEURONTIN) 300 MG capsule Take 1 capsule by mouth in the morning and at bedtime for 30 days.  Intended supply: 90 days 3/17/22 4/16/22 Yes Ermelinda Armstrong MD   rosuvastatin (CRESTOR) 20 MG tablet Take 1 tablet by mouth daily 3/17/22  Yes Ermelinda Armstrong MD   budesonide-formoterol (SYMBICORT) 160-4.5 MCG/ACT AERO Inhale 2 puffs into the lungs 2 times daily 12/30/21  Yes Hoa Alva, DO   albuterol sulfate  (90 Base) MCG/ACT inhaler Inhale 2 puffs into the lungs every 4 hours as needed for Wheezing or Shortness of Breath 12/30/21  Yes Hoa Alva DO   albuterol (PROVENTIL) (2.5 MG/3ML) 0.083% nebulizer solution Take 3 mLs by nebulization every 4 hours as needed for Wheezing 12/30/21  Yes Hoa Alva DO   SPIRIVA HANDIHALER 18 MCG inhalation capsule inhale contents of 1 capsule by mouth once daily 12/30/21  Yes Hoa Alva DO   clopidogrel (PLAVIX) 75 MG tablet Take 1 tablet by mouth daily 8/27/21  Yes Paulino Saldana MD   aspirin 81 MG chewable tablet Take 1 tablet by mouth daily 4/23/21  Yes Paulino Saldana MD   latanoprost (XALATAN) 0.005 % ophthalmic solution instill 1 drop into both eyes at bedtime 4/15/21  Yes Historical Provider, MD   Arginine 500 MG CAPS Take 2 capsules by mouth 3 times daily    Yes Historical Provider, MD   Omega-3 Fatty Acids (FISH OIL) 1200 MG CAPS Take by mouth 2 times daily   Yes Historical Provider, MD   Multiple Vitamins-Minerals (THERAPEUTIC MULTIVITAMIN-MINERALS) tablet Take 1 tablet by mouth 2 times daily   Yes Historical Provider, MD   amLODIPine (NORVASC) 2.5 MG tablet Take 1 tablet by mouth daily  Patient not taking: Reported on 2022 3/17/22   Julia Watson MD     No Known Allergies    Social History     Socioeconomic History    Marital status:      Spouse name: Not on file    Number of children: Not on file    Years of education: Not on file    Highest education level: Not on file   Occupational History    Not on file   Tobacco Use    Smoking status: Former Smoker     Packs/day: 1.50     Years: 40.00     Pack years: 60.00     Types: Cigarettes     Start date:      Quit date:      Years since quittin.2    Smokeless tobacco: Never Used   Vaping Use    Vaping Use: Never used   Substance and Sexual Activity    Alcohol use: No     Comment: 6 cups a day of coffee    Drug use: No    Sexual activity: Not on file   Other Topics Concern    Not on file   Social History Narrative    Not on file     Social Determinants of Health     Financial Resource Strain: Medium Risk    Difficulty of Paying Living Expenses: Somewhat hard   Food Insecurity: No Food Insecurity    Worried About Running Out of Food in the Last Year: Never true    [address] in the Last Year: Never true   Transportation Needs:     Lack of Transportation (Medical): Not on file    Lack of Transportation (Non-Medical):  Not on file   Physical Activity:     Days of Exercise per Week: Not on file    Minutes of Exercise per Session: Not on file   Stress:     Feeling of Stress : Not on file   Social Connections:     Frequency of Communication with Friends and Family: Not on file    Frequency of Social Gatherings with Friends and Family: Not on file    Attends Rastafarian Services: Not on file    Active Member of Clubs or Organizations: Not on file    Attends Club or Organization Meetings: Not on file    Marital Status: Not on file   Intimate Partner Violence:     Fear of Current or Ex-Partner: Not on file    Emotionally Abused: Not on file    Physically Abused: Not on file    Sexually Abused: Not on file   Housing Stability:     Unable to Pay for Housing in the Last Year: Not on file    Number of Places Lived in the Last Year: Not on file    Unstable Housing in the Last Year: Not on file     Family History   Problem Relation Age of Onset    No Known Problems Mother     Heart Disease Father     Heart Attack Father     High Blood Pressure Father     High Cholesterol Father     Diabetes Father     Arrhythmia Sister      REVIEW OF SYSTEMS:     Patient specifically denies fever/chills, chest pain, shortness of breath, new bowel or bladder complaints. All other review of systems was negative. PHYSICAL EXAMINATION:      /80   Pulse 62   Temp 96 °F (35.6 °C) (Infrared)   Resp 16   Ht 5' 10\" (1.778 m)   Wt 183 lb (83 kg)   SpO2 97%   BMI 26.26 kg/m²     General:      General appearance:   elderly, pleasant and well-hydrated. , in moderate discomfort and A & O x3  Build:Normal Weight    HEENT:    Head:normocephalic and atraumatic  Sclera: icterus absent,     Lungs:    Breathing:Breathing Pattern: regular, no distress    Abdomen:    Shape:non-distended and normal  Tenderness:none    Lumbar spine:    Spine inspection:normal   CVA tenderness:No   Palpation:tenderness paravertebral muscles, facet loading, left, right, positive and tenderness. right worse than Left  Range of motion:abnormal moderately Lateral bending, flexion, extension rotation bilateral and is  painful.     Musculoskeletal:    Trigger points in Paraveteral:absent bilaterally  SI joint tenderness:positive right, negative left              PJ test:negative right, negative             left  Piriformis tenderness:negative right, negative left  Trochanteric bursa tenderness:negative right, negative left  SLR:negative right, negative left, sitting Extremities:    Tremors:None bilaterally upper and lower  Range of motion:pain with internal rotation of hips negative. Intact:Yes  Edema:Normal    Neurological:    Sensory:normal to light touch bilateral lower extremities. Motor:                             Right Quadriceps5-/5          Left Quadriceps5-/5           Right Gastrocnemius5-/5    Left Gastrocnemius5-/5  Right Ant Tibialis5-/5  Left Ant Tibialis5-/5  Reflexes:    Right Quadriceps reflex0  Left Quadriceps reflex2+  Right Achilles reflex0  Left Achilles reflex1+  Gait:normal    Dermatology:    Skin:no unusual rashes and no skin lesions    Impression:  Low back pain with radiation to the right buttocks. Lumbar spine MRI 2019 L2-3/L3-4/L4-5 spinal canal stenosis worst at L3-4 with multilevel facet arthritis. Moderate relief with phyiscal therapy. Plan:  Axial low back pain with spinal stenosis. Reviewed lumbar spine MRI and discussed with the patient. Discussed pathology of spinal stenosis. Discussed treatment options including lumbar facet MBB, patient agrees. Currently with Gabapentin 300 mg BID. Cancel urine screen. OARRS report reviewed 04/2022. Patient encouraged to stay active. Treatment plan discussed with the patient including medications and procedure side effects     We discussed with the patient that combining opioids, benzodiazepines, alcohol, illicit drugs or sleep aids increases the risk of respiratory depression including death. We discussed that these medications may cause drowsiness, sedation or dizziness and have counseled the patient not to drive or operate machinery. We have discussed that these medications will be prescribed only by one provider. We have discussed with the patient about age related risk factors and have thoroughly discussed the importance of taking these medications as prescribed. The patient verbalizes understanding. demi Carlson M.D.

## 2022-04-20 ENCOUNTER — TELEPHONE (OUTPATIENT)
Dept: CARDIOLOGY CLINIC | Age: 67
End: 2022-04-20

## 2022-04-20 NOTE — TELEPHONE ENCOUNTER
Patient will be having a pain management procedure needing cardiac clearance along with an ok to hold plavix and asa for 7 days

## 2022-04-25 ENCOUNTER — TELEPHONE (OUTPATIENT)
Dept: PAIN MANAGEMENT | Age: 67
End: 2022-04-25

## 2022-04-25 NOTE — TELEPHONE ENCOUNTER
4/25/2022-upon review of Wilber's chart, it is noted that he takes ASA and Plavix . Medical clearance obtained from Dr. Vaishnavi Helton OK to hold Plavix  for 7 days. [facility]. Call to [facility] him to hold his Plavix  for 7 days before his 05/02/2022 procedure, last dose to be 04/25//2022.  he shows an understanding to not take it before his procedure. Instructed him that the surgery center should call him a few days before for the pre op call and after 3:00 PM the business day before with the arrival time. Carissa verbalized understanding.         Luigi Sandoval RN  Pain Management

## 2022-04-28 ENCOUNTER — TELEPHONE (OUTPATIENT)
Dept: PAIN MANAGEMENT | Age: 67
End: 2022-04-28

## 2022-04-28 DIAGNOSIS — M48.062 SPINAL STENOSIS OF LUMBAR REGION WITH NEUROGENIC CLAUDICATION: ICD-10-CM

## 2022-04-28 RX ORDER — TAMSULOSIN HYDROCHLORIDE 0.4 MG/1
0.4 CAPSULE ORAL DAILY
COMMUNITY
Start: 2022-04-07

## 2022-04-28 RX ORDER — BRIMONIDINE TARTRATE/TIMOLOL 0.2%-0.5%
1 DROPS OPHTHALMIC (EYE) 2 TIMES DAILY
COMMUNITY
Start: 2022-04-12

## 2022-04-28 RX ORDER — BRINZOLAMIDE 10 MG/ML
1 SUSPENSION/ DROPS OPHTHALMIC NIGHTLY
COMMUNITY
Start: 2022-04-22

## 2022-04-28 RX ORDER — GABAPENTIN 300 MG/1
300 CAPSULE ORAL 2 TIMES DAILY
Qty: 60 CAPSULE | Refills: 0 | Status: SHIPPED
Start: 2022-04-28 | End: 2022-06-03

## 2022-04-28 NOTE — TELEPHONE ENCOUNTER
Gael Monteiro calling in asking for refill of Gabapentin - previously filled by Dr. Ciro Vazquez and was instructed that pain management would now need to manage this prescription by Dr. Deniz Arce office. Gabapentin 300mg  Last fill: 3/17/22  OARRS is consistent  Last visit: 4/05/22  Future visit: 5/10/22  Reason: patient is out of medication, took last dose last night.

## 2022-04-28 NOTE — TELEPHONE ENCOUNTER
4-28-22-received a call from the surgery center that they have not been able to get in touch with Claretha Mcardle. Call to him at [phone], unable to leave a message, voice mail not set up.     Idalmis Frank, RN  Pain Management

## 2022-05-02 ENCOUNTER — HOSPITAL ENCOUNTER (OUTPATIENT)
Dept: OPERATING ROOM | Age: 67
Setting detail: OUTPATIENT SURGERY
Discharge: HOME | End: 2022-05-02
Attending: PAIN MEDICINE
Payer: MEDICARE

## 2022-05-02 ENCOUNTER — HOSPITAL ENCOUNTER (OUTPATIENT)
Age: 67
Setting detail: OUTPATIENT SURGERY
Discharge: HOME | End: 2022-05-02
Attending: PAIN MEDICINE | Admitting: PAIN MEDICINE
Payer: MEDICARE

## 2022-05-02 VITALS
OXYGEN SATURATION: 96 % | DIASTOLIC BLOOD PRESSURE: 63 MMHG | WEIGHT: 183 LBS | SYSTOLIC BLOOD PRESSURE: 115 MMHG | BODY MASS INDEX: 26.2 KG/M2 | HEART RATE: 53 BPM | RESPIRATION RATE: 16 BRPM | HEIGHT: 70 IN | TEMPERATURE: 98 F

## 2022-05-02 DIAGNOSIS — M47.896 OTHER OSTEOARTHRITIS OF SPINE, LUMBAR REGION: ICD-10-CM

## 2022-05-02 PROCEDURE — 3209999900 FLUORO FOR SURGICAL PROCEDURES

## 2022-05-02 PROCEDURE — 3600000005 HC SURGERY LEVEL 5 BASE: Performed by: PAIN MEDICINE

## 2022-05-02 PROCEDURE — 7100000011 HC PHASE II RECOVERY - ADDTL 15 MIN: Performed by: PAIN MEDICINE

## 2022-05-02 PROCEDURE — 64493 INJ PARAVERT F JNT L/S 1 LEV: CPT | Performed by: PAIN MEDICINE

## 2022-05-02 PROCEDURE — 2709999900 HC NON-CHARGEABLE SUPPLY: Performed by: PAIN MEDICINE

## 2022-05-02 PROCEDURE — 64494 INJ PARAVERT F JNT L/S 2 LEV: CPT | Performed by: PAIN MEDICINE

## 2022-05-02 PROCEDURE — 7100000010 HC PHASE II RECOVERY - FIRST 15 MIN: Performed by: PAIN MEDICINE

## 2022-05-02 PROCEDURE — 2500000003 HC RX 250 WO HCPCS: Performed by: PAIN MEDICINE

## 2022-05-02 RX ORDER — LIDOCAINE HYDROCHLORIDE 5 MG/ML
INJECTION, SOLUTION INFILTRATION; INTRAVENOUS PRN
Status: DISCONTINUED | OUTPATIENT
Start: 2022-05-02 | End: 2022-05-02 | Stop reason: ALTCHOICE

## 2022-05-02 ASSESSMENT — PAIN DESCRIPTION - DESCRIPTORS: DESCRIPTORS: ACHING

## 2022-05-02 ASSESSMENT — PAIN - FUNCTIONAL ASSESSMENT: PAIN_FUNCTIONAL_ASSESSMENT: 0-10

## 2022-05-02 NOTE — H&P
[facility]  [address] [facility], [address] [place] [address] Torsten  [phone]    Procedure History & Physical      Varinder Tejada     HPI:    Patient  is here for axial low back pain for bilateral L2,3,4 MBB  Labs/imaging studies reviewed   All question and concerns addressed including R/B/A associated with the procedure    Past Medical History:   Diagnosis Date    Arthritis     Asthma     Breathing difficulty     CAD (coronary artery disease)     COPD (chronic obstructive pulmonary disease) ([address]     Non-rheumatic tricuspid valve insufficiency     Nonrheumatic mitral (valve) insufficiency        Past Surgical History:   Procedure Laterality Date    APPENDECTOMY      COLONOSCOPY      CORONARY ANGIOPLASTY WITH STENT PLACEMENT  12/22/2020    DR. Mandel Resolute Christopher DUANE 2.25x18 Mid LAD, Resolute Closplint DUANE 2.5x18 Mid Cx. EF 65%    HERNIA REPAIR      INTRACAPSULAR CATARACT EXTRACTION Left 4/6/2021    LEFT   CATARACT EXTRACTION  IOL IMPLANT I STENT performed by Félix Parks MD at [place] [id number]      right wrist carpal tunnel release, right long trigger finger release    OTHER SURGICAL HISTORY Right 01/20/2016    right shoulder arthroscopy acromioplasty with decompression rotator cuff repair     OTHER SURGICAL HISTORY Left 04/06/2021    LEFT EYE CATERACT EXTRACTION WITH INTRA OCULAR IMPLANT WITH I STENT    SHOULDER ARTHROSCOPY  3/4/11    left rotator cuff repair       Prior to Admission medications    Medication Sig Start Date End Date Taking?  Authorizing Provider   tamsulosin (FLOMAX) 0.4 MG capsule Take 0.4 mg by mouth daily 4/7/22   Historical Provider, MD   COMBIGAN 0.2-0.5 % ophthalmic solution Place 1 drop into both eyes 2 times daily 4/12/22   Historical Provider, MD   brinzolamide (AZOPT) 1 % ophthalmic suspension Place 1 drop into both eyes nightly 4/22/22   Historical Provider, MD   gabapentin (NEURONTIN) 300 MG capsule Take 1 capsule by mouth in the morning and at bedtime for 30 days.  Intended supply: 90 days 4/28/22 5/28/22  Sharon Cortes MD   amLODIPine (NORVASC) 2.5 MG tablet Take 1 tablet by mouth daily 3/17/22   Ermelinda Armstrong MD   rosuvastatin (CRESTOR) 20 MG tablet Take 1 tablet by mouth daily 3/17/22   Ermelinda Armstrong MD   budesonide-formoterol (SYMBICORT) 160-4.5 MCG/ACT AERO Inhale 2 puffs into the lungs 2 times daily 12/30/21   Hoa Alva DO   albuterol sulfate  (90 Base) MCG/ACT inhaler Inhale 2 puffs into the lungs every 4 hours as needed for Wheezing or Shortness of Breath 12/30/21   Abad Alva DO   albuterol (PROVENTIL) (2.5 MG/3ML) 0.083% nebulizer solution Take 3 mLs by nebulization every 4 hours as needed for Wheezing 12/30/21   Abad Cardenas DO   SPIRIVA HANDIHALER 18 MCG inhalation capsule inhale contents of 1 capsule by mouth once daily 12/30/21   Abad Alva DO   clopidogrel (PLAVIX) 75 MG tablet Take 1 tablet by mouth daily 8/27/21   Jack Cazares MD   aspirin 81 MG chewable tablet Take 1 tablet by mouth daily 4/23/21   Jack Cazares MD   latanoprost (XALATAN) 0.005 % ophthalmic solution instill 1 drop into both eyes at bedtime 4/15/21   Historical Provider, MD   Arginine 500 MG CAPS Take 2 capsules by mouth 3 times daily     Historical Provider, MD   Omega-3 Fatty Acids (FISH OIL) 1200 MG CAPS Take by mouth 2 times daily    Historical Provider, MD   Multiple Vitamins-Minerals (THERAPEUTIC MULTIVITAMIN-MINERALS) tablet Take 1 tablet by mouth 2 times daily    Historical Provider, MD       No Known Allergies    Social History     Socioeconomic History    Marital status:      Spouse name: Not on file    Number of children: Not on file    Years of education: Not on file    Highest education level: Not on file   Occupational History    Not on file   Tobacco Use    Smoking status: Former Smoker     Packs/day: 1.50     Years: 40.00     Pack years: 60.00     Types: Cigarettes     Start date: 56     Quit date: 2016     Years since quittin.3    Smokeless tobacco: Never Used   Vaping Use    Vaping Use: Never used   Substance and Sexual Activity    Alcohol use: No     Comment: 6 cups a day of coffee    Drug use: No    Sexual activity: Not on file   Other Topics Concern    Not on file   Social History Narrative    Not on file     Social Determinants of Health     Financial Resource Strain: Medium Risk    Difficulty of Paying Living Expenses: Somewhat hard   Food Insecurity: No Food Insecurity    Worried About Running Out of Food in the Last Year: Never true    Ileana of Food in the Last Year: Never true   Transportation Needs:     Lack of Transportation (Medical): Not on file    Lack of Transportation (Non-Medical):  Not on file   Physical Activity:     Days of Exercise per Week: Not on file    Minutes of Exercise per Session: Not on file   Stress:     Feeling of Stress : Not on file   Social Connections:     Frequency of Communication with Friends and Family: Not on file    Frequency of Social Gatherings with Friends and Family: Not on file    Attends Restorationist Services: Not on file    Active Member of [address] [place] [address] or Organizations: Not on file    Attends Club or Organization Meetings: Not on file    Marital Status: Not on file   Intimate Partner Violence:     Fear of Current or Ex-Partner: Not on file    Emotionally Abused: Not on file    Physically Abused: Not on file    Sexually Abused: Not on file   Housing Stability:     Unable to Pay for Housing in the Last Year: Not on file    Number of [place] in the Last Year: Not on file    Unstable Housing in the Last Year: Not on file       Family History   Problem Relation Age of Onset    No Known Problems Mother     Heart Disease Father     Heart Attack Father     High Blood Pressure Father     High Cholesterol Father     Diabetes Father     Arrhythmia Sister          REVIEW OF SYSTEMS:    CONSTITUTIONAL:  negative for fevers, chills, sweats and fatigue    RESPIRATORY:  negative for  dry cough, cough with sputum, dyspnea, wheezing and chest pain    CARDIOVASCULAR:  negative for chest pain, dyspnea, palpitations, syncope    GASTROINTESTINAL:  negative for nausea, vomiting, change in bowel habits, diarrhea, constipation and abdominal pain    MUSCULOSKELETAL: negative for muscle weakness    SKIN: negative for itching or rashes. BEHAVIOR/PSYCH:  negative for poor appetite, increased appetite, decreased sleep and poor concentration    All other systems negative      PHYSICAL EXAM:    VITALS:  /74   Pulse 60   Temp 98 °F (36.7 °C) (Skin)   Resp 18   Ht 5' 10\" (1.778 m)   Wt 183 lb (83 kg)   SpO2 98%   BMI 26.26 kg/m²     CONSTITUTIONAL:  awake, alert, cooperative, no apparent distress, and appears stated age    EYES: PERRLA, EOMI    LUNGS:  No increased work of breathing, no audible wheezing    CARDIOVASCULAR:  regular rate and rhythm    ABDOMEN:  Soft non tender non distended     EXTREMITIES: no signs of clubbing or cyanosis. MUSCULOSKELETAL: negative for flaccid muscle tone or spastic movements. SKIN: gross examination reveals no signs of rashes, or diaphoresis. NEURO: Cranial nerves II-XII grossly intact. No signs of agitated mood. Assessment/Plan:    Axial low back pain for bilateral L2,3,4 MBB.

## 2022-05-02 NOTE — OP NOTE
2022    Patient: Nick Cormier  :  1955  Age:  77 y.o. Sex:  male     PRE-OPERATIVE DIAGNOSIS: Bilateral Lumbar spondylosis, lumbar facet syndrome. POST-OPERATIVE DIAGNOSIS: Same. PROCEDURE:  # 1 Fluoroscopic guided lumbar medial branch blocks Bilateral at Levels: L2,3,4 MBB. SURGEON: MARIELLA Carter M.D. ANESTHESIA: Local    ESTIMATED BLOOD LOSS: None.  ______________________________________________________________________  BRIEF HISTORY:  Nick Cormier comes in today for 1 fluoroscopic guided lumbar medial branch blocks  Bilateral  at Levels: L2,3,4 MBB. The potential complications of this procedure were discussed with him again today. He has elected to undergo the aforementioned procedure. Cristian complete History & Physical examination were reviewed in depth, a copy of which is in the chart. DESCRIPTION OF PROCEDURE:   After confirming written and informed consent, a time-out was performed and [facility] Book name and date of birth, the procedure to be performed as well as the plan for the location of the needle insertion were confirmed. The patient was brought into the procedure room and placed in the prone position on the fluoroscopy table. Standard monitors were placed and vital signs were observed throughout the procedure. The area of the lumbar spine was prepped with chloraprep and draped in a sterile manner. AP fluoroscopy was used to identify and reggie bartons point at the targeted levels. The skin and subcutaneous tissues in these identified areas were anesthetized with 0.5%Lidocaine. A 22 # gauge 3 1/2 spinal needle was advanced toward the junction of the superior articular process and the transverse process, along the course of the medial branch. Satisfactory needle placement was confirmed by AP and oblique projections.   At the sacral alar level, the sacral alar region was visualized and the needle tip was positioned on the sacral alar at the base of the superior articulating process where the medial branch traverses under fluoroscopic guidance. Once bone was contacted and negative aspiration was confirmed. A solution of 0.25% marcaine with 40 DepoMedrol 3 cc was then injected and distributed equally at each level. Following the procedure Edatomas Jacobs noted improvement of previous pain symptoms. Disposition the patient tolerated the procedure well and there were no complications . Vital signs remained stable throughout the procedure. The patient was escorted to the recovery area where they remained until discharge and written discharge instructions for the procedure were given. Plan: Eda Jacobs will return to our pain management center as scheduled.      Elise Crystal MD

## 2022-05-09 NOTE — PROGRESS NOTES
[facility] Cardiology Progress Note  Dr. Triston Osborn      Referring Physician: Cisco Weir MD  CHIEF COMPLAINT:   Chief Complaint   Patient presents with    Coronary Artery Disease     6 months, patient has no complaints       HISTORY OF PRESENT ILLNESS:   Patient is 77year old male CAD s/p PCI to LAD in December 2020, is here for follow-up appointment. Occasional shortness of breath when lays flat while working on his car, patient denies any chest pain, no lightheadedness, no dizziness, no palpitations, no pedal edema, no PND, no orthopnea, no syncope, no presyncopal episodes. Physically active without any cardiac complaints    Past Medical History:   Diagnosis Date    Arthritis     Asthma     Breathing difficulty     CAD (coronary artery disease)     COPD (chronic obstructive pulmonary disease) (HCC)     Non-rheumatic tricuspid valve insufficiency     Nonrheumatic mitral (valve) insufficiency          Past Surgical History:   Procedure Laterality Date    APPENDECTOMY      COLONOSCOPY      CORONARY ANGIOPLASTY WITH STENT PLACEMENT  12/22/2020    DR. Mandel Resolute [place] DUANE 2.25x18 Mid LAD, Resolute [place] DUANE 2.5x18 Mid Cx.   EF 65%    HERNIA REPAIR      INTRACAPSULAR CATARACT EXTRACTION Left 4/6/2021    LEFT   CATARACT EXTRACTION  IOL IMPLANT I STENT performed [facility] Bilateral 5/2/2022    BILATERAL L2 L3 L4 MEDIAL BRACH NERVE BLOCK (CPT 86324) performed by Brenda Escudero MD at [place] [address]      right wrist carpal tunnel release, right long trigger finger release    OTHER SURGICAL HISTORY Right 01/20/2016    right shoulder arthroscopy acromioplasty with decompression rotator cuff repair     OTHER SURGICAL HISTORY Left 04/06/2021    LEFT EYE CATERACT EXTRACTION WITH INTRA OCULAR IMPLANT WITH I STENT    SHOULDER ARTHROSCOPY  3/4/11    left rotator cuff repair         Current Outpatient Medications   Medication Sig Dispense Refill    tamsulosin (FLOMAX) 0.4 MG capsule Take 0.4 mg by mouth daily      COMBIGAN 0.2-0.5 % ophthalmic solution Place 1 drop into both eyes 2 times daily      brinzolamide (AZOPT) 1 % ophthalmic suspension Place 1 drop into both eyes nightly      gabapentin (NEURONTIN) 300 MG capsule Take 1 capsule by mouth in the morning and at bedtime for 30 days. Intended supply: 90 days 60 capsule 0    rosuvastatin (CRESTOR) 20 MG tablet Take 1 tablet by mouth daily 90 tablet 0    budesonide-formoterol (SYMBICORT) 160-4.5 MCG/ACT AERO Inhale 2 puffs into the lungs 2 times daily 3 each 3    albuterol sulfate  (90 Base) MCG/ACT inhaler Inhale 2 puffs into the lungs every 4 hours as needed for Wheezing or Shortness of Breath 3 each 3    albuterol (PROVENTIL) (2.5 MG/3ML) 0.083% nebulizer solution Take 3 mLs by nebulization every 4 hours as needed for Wheezing 360 each 3    SPIRIVA HANDIHALER 18 MCG inhalation capsule inhale contents of 1 capsule by mouth once daily 90 capsule 3    clopidogrel (PLAVIX) 75 MG tablet Take 1 tablet by mouth daily 90 tablet 3    aspirin 81 MG chewable tablet Take 1 tablet by mouth daily 90 tablet 3    latanoprost (XALATAN) 0.005 % ophthalmic solution instill 1 drop into both eyes at bedtime      Arginine 500 MG CAPS Take 2 capsules by mouth 3 times daily       Omega-3 Fatty Acids (FISH OIL) 1200 MG CAPS Take by mouth 2 times daily      Multiple Vitamins-Minerals (THERAPEUTIC MULTIVITAMIN-MINERALS) tablet Take 1 tablet by mouth 2 times daily      amLODIPine (NORVASC) 2.5 MG tablet Take 1 tablet by mouth daily (Patient not taking: Reported on 5/10/2022) 90 tablet 0     No current facility-administered medications for this visit.          Allergies as of 05/10/2022    (No Known Allergies)       Social History     Socioeconomic History    Marital status:      Spouse name: Not on file    Number of children: Not on file    Years of education: Not on file    Highest education level: Not on file   Occupational History    Not on file   Tobacco Use    Smoking status: Former Smoker     Packs/day: 1.50     Years: 40.00     Pack years: 60.00     Types: Cigarettes     Start date: 56     Quit date: 2016     Years since quittin.3    Smokeless tobacco: Never Used   Vaping Use    Vaping Use: Never used   Substance and Sexual Activity    Alcohol use: No     Comment: 6 cups a day of coffee    Drug use: No    Sexual activity: Not on file   Other Topics Concern    Not on file   Social History Narrative    Not on file     Social Determinants of Health     Financial Resource Strain: Medium Risk    Difficulty of Paying Living Expenses: Somewhat hard   Food Insecurity: No Food Insecurity    Worried About Running Out of Food in the Last Year: Never true    Ileana of Food in the Last Year: Never true   Transportation Needs:     Lack of Transportation (Medical): Not on file    Lack of Transportation (Non-Medical):  Not on file   Physical Activity:     Days of Exercise per Week: Not on file    Minutes of Exercise per Session: Not on file   Stress:     Feeling of Stress : Not on file   Social Connections:     Frequency of Communication with Friends and Family: Not on file    Frequency of Social Gatherings with Friends and Family: Not on file    Attends Buddhism Services: Not on file    Active Member of [address] [place] [address] [employer] or Organizations: Not on file    Attends Club or Organization Meetings: Not on file    Marital Status: Not on file   Intimate Partner Violence:     Fear of Current or Ex-Partner: Not on file    Emotionally Abused: Not on file    Physically Abused: Not on file    Sexually Abused: Not on file   Housing Stability:     Unable to Pay for Housing in the Last Year: Not on file    Number of [place] in the Last Year: Not on file    Unstable Housing in the Last Year: Not on file       Family History   Problem Relation Age of Onset    No Known Problems Mother     Heart Disease Father     Heart Attack Father     High Blood Pressure Father     High Cholesterol Father     Diabetes Father     Arrhythmia Sister        REVIEW OF SYSTEMS:     CONSTITUTIONAL:  negative for  fevers, chills, sweats, fatigue  HEENT:  negative for  tinnitus, earaches, nasal congestion and epistaxis  RESPIRATORY:  negative for  dry cough, cough with sputum,wheezing and hemoptysis  GASTROINTESTINAL:  negative for nausea, vomiting, diarrhea, constipation, pruritus and jaundice  HEMATOLOGIC/LYMPHATIC:  negative for easy bruising, bleeding, lymphadenopathy and petechiae  ENDOCRINE:  negative for heat intolerance, cold intolerance, tremor, hair loss and diabetic symptoms including neither polyuria nor polydipsia nor blurred vision  MUSCULOSKELETAL:  negative for  myalgias, arthralgias, joint swelling, stiff joints and decreased range of motion  NEUROLOGICAL:  negative for memory problems, speech problems, visual disturbance, dysphagia, weakness and numbness    PHYSICAL EXAM:   Constitutional:  Awake, alert cooperative, no apparent distress, and appears stated age. HEENT:  Moist and pink mucous membranes, normocephalic, without obvious abnormality, atraumatic, normal ears and nose. NECK:  Supple, symmetrical, trachea midline, no JVD, no adenopathy, thyroid symmetric, not enlarged and no tenderness, good carotid upstroke bilaterally, no carotid bruit, skin normal.   LUNGS: No increased work of breathing, good air exchange, clear to auscultation bilaterally, no crackles or wheezing. Cardiovascular: Normal apical impulse, regular rate and rhythm, normal S1 and S2, no S3 or S4, 2/6 systolic murmur at the apex, 2/6 diastolic murmur at the right upper sternal border, 2/6 systolic murmur at the left lower sternal border, no pedal edema, good carotid upstroke bilaterally, no carotid bruit, no JVD, no abdominal pulsating masses.    ABDOMEN: Soft, nontender, no hepatomegaly, no splenomegaly, bowel sound positive. Musculoskeletal:  No clubbing or cyanosis. No redness, warmth, or swelling of the joints. Neurological: Alert, awake, and oriented X3   SKIN: No bruises, no bleeding, normal skin color, texture, turgor and no redness, warmth or swelling. /76   Pulse 51   Resp 16   Ht 5' 10\" (1.778 m)   Wt 178 lb (80.7 kg)   BMI 25.54 kg/m²     DATA:   I personally reviewed the visit EKG with the following interpretation: Sinus bradycardia, right bundle branch block with left fascicular block    EKG - 8/27/21 Sinus bradycardia, right bundle branch block, normal axis    ECHO: 3/12/2019) Normal let ventricular systolic function with normal diastolic function. Mild mitral valve regurgitation. Mild tricuspid valve regurgitation with normal RVSP. Stress Test: 12/17/20 Impression:     1. Exercise EKG was abnormal with Downsloping ST depression in   the inferior leads, horizontal ST depression in the anterolateral   leads, with high predictive value for ischemia.     2. The patient experienced Shortness of breath with some   tightness in the chest with exercise. 3. Gusman treadmill score was -9 implying intermediate risk of   acute ischemic events.     4. Exercise capacity was below average. Angiography: 12/22/20 CONCLUSIONS:  1. Coronary artery disease:  a. Left main:  No significant disease. b.  LAD:  20% eccentric proximal vessel narrowing, 50% mid vessel  disease and 80% to 90% mid to distal vessel stenosis after the large  terminal diagonal branch.  c.  LCX:  70% ostial stenosis of a small to moderate first marginal  branch. No significant disease of the large second marginal branch. 70% stenosis of the mid to distal left circumflex. d.  RCA:  Dominant vessel with around 50% to 60% ostial narrowing of the  small posterolateral branch and around 30% ostial narrowing of the large  posterior descending artery branch.   2.  Normal left ventricular size and systolic function with an estimated  ejection fraction of 65%. 3.  Successful balloon angioplasty with the deployment of drug-eluting  coronary stent to the mid to distal LAD with very good results. 4.  Successful balloon angioplasty with the deployment of drug-eluting  coronary stent to the mid to distal left circumflex with very good  results.       Cardiology Labs: BMP:    Lab Results   Component Value Date     02/14/2022    K 4.5 02/14/2022    K 3.4 12/23/2020     02/14/2022    CO2 20 02/14/2022    BUN 11 02/14/2022    CREATININE 0.9 02/14/2022     CMP:    Lab Results   Component Value Date     02/14/2022    K 4.5 02/14/2022    K 3.4 12/23/2020     02/14/2022    CO2 20 02/14/2022    BUN 11 02/14/2022    CREATININE 0.9 02/14/2022    PROT 7.5 02/14/2022     CBC:    Lab Results   Component Value Date    WBC 6.9 06/21/2021    RBC 4.87 06/21/2021    HGB 12.9 06/21/2021    HCT 41.7 06/21/2021    MCV 85.6 06/21/2021    RDW 17.3 06/21/2021     06/21/2021     PT/INR:  No results found for: PTINR  PT/INR Warfarin:  No components found for: PTPATWAR, PTINRWAR  PTT:  No results found for: APTT  PTT Heparin:  No components found for: APTTHEP  Magnesium:    Lab Results   Component Value Date    MG 2.0 12/23/2020     TSH:  No results found for: TSH  TROPONIN:  No components found for: TROP  BNP:  No results found for: BNP  FASTING LIPID PANEL:    Lab Results   Component Value Date    CHOL 125 02/14/2022    HDL 43 02/14/2022    TRIG 110 02/14/2022     No orders to display     I have personally reviewed the laboratory, cardiac diagnostic and radiographic testing as outlined above:      IMPRESSION:  1: CAD: Patient had cardiac catheterization on 12/22/2020 with the following findings:  # Left main:  No significant disease. # LAD:  20% eccentric proximal vessel narrowing, 50% mid vessel disease and 80% to 90% mid to distal vessel stenosis after the large terminal diagonal branch.   # LCX:  70% ostial stenosis of a small to moderate first marginal branch. No significant disease of the large second marginal branch. 70% stenosis of the mid to distal left circumflex. # RCA:  Dominant vessel with around 50% to 60% ostial narrowing of the small posterolateral branch and around 30% ostial narrowing of the large posterior descending artery branch. # Normal left ventricular size and systolic function with an estimated ejection fraction of 65%. # Successful balloon angioplasty with the deployment of drug-eluting coronary stent to the mid to distal LAD with very good results. # Successful balloon angioplasty with the deployment of drug-eluting coronary stent to the mid to distal left circumflex with very good results. Continue current treatment  2: Mitral valve regurgitation: Mild             3: Tricuspid valve regurgitation: Mild            4: Chronic obstructive pulmonary disease, unspecified              5:  Family history of ischemic heart disease and other diseases: Father had his first heart attack at age 61               RECOMMENDATIONS:   1. Will discontinue Plavix  2. Continue the rest of medications  3. Preventive cardiology: Low-salt, low-cholesterol diet, daily exercise, total cholesterol of less than 200, LDL of less than 70, were all advised. 4.  Follow-up with  as scheduled  5. Follow-up with Dr. Mari Choe in 9 months, sooner if symptomatic for any reason    I have reviewed my findings and recommendations with patient    Electronically signed by Paulino Saldana MD on 5/10/2022 at 9:33 AM    NOTE: This report was transcribed using voice recognition software.  Every effort was made to ensure accuracy; however, inadvertent computerized transcription errors may be presen

## 2022-05-10 ENCOUNTER — OFFICE VISIT (OUTPATIENT)
Dept: CARDIOLOGY CLINIC | Age: 67
End: 2022-05-10
Payer: MEDICARE

## 2022-05-10 ENCOUNTER — OFFICE VISIT (OUTPATIENT)
Dept: PAIN MANAGEMENT | Age: 67
End: 2022-05-10
Payer: MEDICARE

## 2022-05-10 VITALS
SYSTOLIC BLOOD PRESSURE: 110 MMHG | TEMPERATURE: 97.5 F | BODY MASS INDEX: 25.48 KG/M2 | WEIGHT: 178 LBS | DIASTOLIC BLOOD PRESSURE: 60 MMHG | HEART RATE: 58 BPM | OXYGEN SATURATION: 97 % | RESPIRATION RATE: 16 BRPM | HEIGHT: 70 IN

## 2022-05-10 VITALS
HEIGHT: 70 IN | SYSTOLIC BLOOD PRESSURE: 132 MMHG | RESPIRATION RATE: 16 BRPM | HEART RATE: 51 BPM | WEIGHT: 178 LBS | BODY MASS INDEX: 25.48 KG/M2 | DIASTOLIC BLOOD PRESSURE: 76 MMHG

## 2022-05-10 DIAGNOSIS — I25.10 CAD IN NATIVE ARTERY: Primary | ICD-10-CM

## 2022-05-10 DIAGNOSIS — M47.816 LUMBAR FACET ARTHROPATHY: ICD-10-CM

## 2022-05-10 DIAGNOSIS — M48.062 SPINAL STENOSIS OF LUMBAR REGION WITH NEUROGENIC CLAUDICATION: Primary | ICD-10-CM

## 2022-05-10 DIAGNOSIS — M51.9 LUMBAR DISC DISORDER: ICD-10-CM

## 2022-05-10 DIAGNOSIS — M47.816 LUMBAR SPONDYLOSIS: ICD-10-CM

## 2022-05-10 DIAGNOSIS — G89.4 CHRONIC PAIN SYNDROME: ICD-10-CM

## 2022-05-10 PROCEDURE — 99213 OFFICE O/P EST LOW 20 MIN: CPT | Performed by: PAIN MEDICINE

## 2022-05-10 PROCEDURE — 93000 ELECTROCARDIOGRAM COMPLETE: CPT | Performed by: INTERNAL MEDICINE

## 2022-05-10 PROCEDURE — 99214 OFFICE O/P EST MOD 30 MIN: CPT | Performed by: INTERNAL MEDICINE

## 2022-05-10 RX ORDER — SODIUM CHLORIDE 0.9 % (FLUSH) 0.9 %
5-40 SYRINGE (ML) INJECTION PRN
Status: CANCELLED | OUTPATIENT
Start: 2022-05-10

## 2022-05-10 RX ORDER — SODIUM CHLORIDE 9 MG/ML
INJECTION, SOLUTION INTRAVENOUS PRN
Status: CANCELLED | OUTPATIENT
Start: 2022-05-10

## 2022-05-10 RX ORDER — SODIUM CHLORIDE 0.9 % (FLUSH) 0.9 %
5-40 SYRINGE (ML) INJECTION EVERY 12 HOURS SCHEDULED
Status: CANCELLED | OUTPATIENT
Start: 2022-05-10

## 2022-05-10 NOTE — PROGRESS NOTES
[address] [facility], [address] [place] [address] [place]  [phone]    Follow up Note      Norman Monte     Date of Visit:  5/10/2022    CC:  Patient presents for follow up   Chief Complaint   Patient presents with    Follow Up After Procedure     # 1 Fluoroscopic guided lumbar medial branch blocks Bilateral at Levels: L2,3,4 MBB. HPI:    Pain is better. Appropriate analgesia with current medications regimen: N/A. Change in quality of symptoms:no. Medication side effects:not applicable . Recent diagnostic testing:none. Recent interventional procedures:Bilateral L3,4,5 MBB with more than 80% improvement in his pain for more than one week. He has been on anticoagulation medications to include ASA and Plavix. The patient  has not been on herbal supplements. The patient is not diabetic. Imaging:   Lumbar spine MRI   1. Significant multilevel spinal stenosis most severe L3-4.   2. Soft tissue structure along the right posterior margin of the L3   vertebra could be disc fragment or relatively less likely mass. Patient will be recalled for postcontrast imaging through this region   and an addendum issued. Previous treatments: Physical Therapy and medications. .          Potential Aberrant Drug-Related Behavior:    No    Urine Drug Screening:  No    OARRS report:  05/2022 consistent     Opioid Agreement:  Renewal date:N/A    Past Medical History:   Diagnosis Date    Arthritis     Asthma     Breathing difficulty     CAD (coronary artery disease)     COPD (chronic obstructive pulmonary disease) ([facility] [address]     Non-rheumatic tricuspid valve insufficiency     Nonrheumatic mitral (valve) insufficiency      Past Surgical History:   Procedure Laterality Date    APPENDECTOMY      COLONOSCOPY      CORONARY ANGIOPLASTY WITH STENT PLACEMENT  12/22/2020    DR. Mandel Res[facility] [place] DUANE 2.25x18 Mid LAD, [employer] [place] DUANE 2.5x18 Mid Cx.   EF 65%    HERNIA REPAIR      INTRACAPSULAR CATARACT EXTRACTION Left 4/6/2021    LEFT   CATARACT EXTRACTION  IOL IMPLANT I STENT performed by Meghan Pierce MD at [facility] Bilateral 5/2/2022    BILATERAL L2 L3 L4 MEDIAL BRACH NERVE BLOCK (CPT 98589) performed by Guido Cullen MD at [place] [id number]      right wrist carpal tunnel release, right long trigger finger release    OTHER SURGICAL HISTORY Right 01/20/2016    right shoulder arthroscopy acromioplasty with decompression rotator cuff repair     OTHER SURGICAL HISTORY Left 04/06/2021    LEFT EYE CATERACT EXTRACTION WITH INTRA OCULAR IMPLANT WITH I STENT    SHOULDER ARTHROSCOPY  3/4/11    left rotator cuff repair     Prior to Admission medications    Medication Sig Start Date End Date Taking? Authorizing Provider   tamsulosin (FLOMAX) 0.4 MG capsule Take 0.4 mg by mouth daily 4/7/22  Yes Historical Provider, MD   COMBIGAN 0.2-0.5 % ophthalmic solution Place 1 drop into both eyes 2 times daily 4/12/22  Yes Historical Provider, MD   brinzolamide (AZOPT) 1 % ophthalmic suspension Place 1 drop into both eyes nightly 4/22/22  Yes Historical Provider, MD   gabapentin (NEURONTIN) 300 MG capsule Take 1 capsule by mouth in the morning and at bedtime for 30 days.  Intended supply: 90 days 4/28/22 5/28/22 Yes Guido Cullen MD   amLODIPine (NORVASC) 2.5 MG tablet Take 1 tablet by mouth daily 3/17/22  Yes Timoteo Peters MD   rosuvastatin (CRESTOR) 20 MG tablet Take 1 tablet by mouth daily 3/17/22  Yes Ermelinda Armstrong MD   budesonide-formoterol (SYMBICORT) 160-4.5 MCG/ACT AERO Inhale 2 puffs into the lungs 2 times daily 12/30/21  Yes Hoa Alva, DO   albuterol sulfate  (90 Base) MCG/ACT inhaler Inhale 2 puffs into the lungs every 4 hours as needed for Wheezing or Shortness of Breath 12/30/21  Yes Hoa Alva, DO   albuterol (PROVENTIL) (2.5 MG/3ML) 0.083% nebulizer solution Take 3 mLs by nebulization every 4 hours as needed for Wheezing 21  Yes Hoa Alva DO   SPIRIVA HANDIHALER 18 MCG inhalation capsule inhale contents of 1 capsule by mouth once daily 21  Yes Hoa Alva DO   aspirin 81 MG chewable tablet Take 1 tablet by mouth daily 21  Yes Zonia Harris MD   latanoprost (XALATAN) 0.005 % ophthalmic solution instill 1 drop into both eyes at bedtime 4/15/21  Yes Historical Provider, MD   Arginine 500 MG CAPS Take 2 capsules by mouth 3 times daily    Yes Historical Provider, MD   Omega-3 Fatty Acids (FISH OIL) 1200 MG CAPS Take by mouth 2 times daily   Yes Historical Provider, MD   Multiple Vitamins-Minerals (THERAPEUTIC MULTIVITAMIN-MINERALS) tablet Take 1 tablet by mouth 2 times daily   Yes Historical Provider, MD     No Known Allergies    Social History     Socioeconomic History    Marital status:      Spouse name: Not on file    Number of children: Not on file    Years of education: Not on file    Highest education level: Not on file   Occupational History    Not on file   Tobacco Use    Smoking status: Former Smoker     Packs/day: 1.50     Years: 40.00     Pack years: 60.00     Types: Cigarettes     Start date:      Quit date:      Years since quittin.3    Smokeless tobacco: Never Used   Vaping Use    Vaping Use: Never used   Substance and Sexual Activity    Alcohol use: No     Comment: 6 cups a day of coffee    Drug use: No    Sexual activity: Not on file   Other Topics Concern    Not on file   Social History Narrative    Not on file     Social Determinants of Health     Financial Resource Strain: Medium Risk    Difficulty of Paying Living Expenses: Somewhat hard   Food Insecurity: No Food Insecurity    Worried About Running Out of Food in the Last Year: Never true    Ileana of Food in the Last Year: Never true   Transportation Needs:     Lack of Transportation (Medical): Not on file    Lack of Transportation (Non-Medical):  Not on file   Physical Activity:     Days of Exercise per Week: Not on file    Minutes of Exercise per Session: Not on file   Stress:     Feeling of Stress : Not on file   Social Connections:     Frequency of Communication with Friends and Family: Not on file    Frequency of Social Gatherings with Friends and Family: Not on file    Attends [employer] Services: Not on file    Active Member of [address] [place] [address] [employer] or Organizations: Not on file    Attends Club or Organization Meetings: Not on file    Marital Status: Not on file   Intimate Partner Violence:     Fear of Current or Ex-Partner: Not on file    Emotionally Abused: Not on file    Physically Abused: Not on file    Sexually Abused: Not on file   Housing Stability:     Unable to Pay for Housing in the Last Year: Not on file    Number of [place] in the Last Year: Not on file    Unstable Housing in the Last Year: Not on file     Family History   Problem Relation Age of Onset    No Known Problems Mother     Heart Disease Father     Heart Attack Father     High Blood Pressure Father     High Cholesterol Father     Diabetes Father     Arrhythmia Sister      REVIEW OF SYSTEMS:     Marisel Meier denies fever/chills, chest pain, shortness of breath, new bowel or bladder complaints. All other review of systems was negative. PHYSICAL EXAMINATION:      /60   Pulse 58   Temp 97.5 °F (36.4 °C) (Infrared)   Resp 16   Ht 5' 10\" (1.778 m)   Wt 178 lb (80.7 kg)   SpO2 97%   BMI 25.54 kg/m²     General:       General appearance:   elderly, pleasant and well-hydrated. , in mild discomfort and A & O x3  Build:Normal Weight     HEENT:     Head:normocephalic and atraumatic  Sclera: icterus absent,      Lungs:     Breathing:Breathing Pattern: regular, no distress     Abdomen:     Shape:non-distended and normal  Tenderness:none     Lumbar spine:     Spine inspection:normal   CVA tenderness:No   Palpation:tenderness paravertebral muscles, facet loading, left, right, positive and tenderness. right worse than Left  Range of motion:not tested.     Musculoskeletal:     Trigger points in Paravet[place]:absent bilaterally  SI joint tenderness:positive right, negative left  SLR:negative right, negative left, sitting      Extremities:     Tremors:None bilaterally upper and lower  Range of motion:pain with internal rotation of hips negative. Intact:Yes  Edema:Normal     Neurological:     Sensory:normal to light touch bilateral lower extremities. Motor:                             Right Quadriceps5-/5          Left Quadriceps5-/5           Right Gastrocnemius5-/5    Left Gastrocnemius5-/5  Right Ant Tibialis5-/5  Left Ant Tibialis5-/5  Reflexes:    Right Quadriceps reflex0  Left Quadriceps reflex2+  Right Achilles reflex0  Left Achilles reflex1+  Gait:normal     Dermatology:     Skin:no unusual rashes and no skin lesions     Impression:  Low back pain with radiation to the right buttocks. Lumbar spine MRI 2019 L2-3/L3-4/L4-5 spinal canal stenosis worst at L3-4 with multilevel facet arthritis. Moderate relief with phyiscal therapy. Plan:  Follow up on his low back pain with no acute issues. Patient is s/p bilateral L3,4,5 MBB with more than 80% improvement in his pain for more than one week. Discussed repeating and if his pain relief is consistent with first procedure will proceed with RFA. Currently with Gabapentin 300 mg BID. OARRS report reviewed 05/2022. Patient encouraged to stay active. Treatment plan discussed with the patient including medications and procedure side effects     We discussed with the patient that combining opioids, benzodiazepines, alcohol, illicit drugs or sleep aids increases the risk of respiratory depression including death. We discussed that these medications may cause drowsiness, sedation or dizziness and have counseled the patient not to drive or operate machinery. We have discussed that these medications will be prescribed only by one provider.  We have discussed with the patient about age related risk factors and have thoroughly discussed the importance of taking these medications as prescribed. The patient verbalizes understanding. ccrefzioning jeffrey Perez M.D.

## 2022-05-10 NOTE — PROGRESS NOTES
Do you currently have any of the following:    Fever: No  Headache:  No  Cough: No  Shortness of breath: No  Exposed to anyone with these symptoms: No                                                                                                                Mary Mcgowan presents to the [address] [address] on 5/10/2022. Luanne Das is complaining of pain in his lower back. . The pain is intermittent. The pain is described as in the lower right side it feels like a marble in there, and it aches. . Pain is rated on his best day at a 2, on his worst day at a 3, and on average at a 2 on the VAS scale. He took his last dose of Neurontin . Luanne Das does not have issues with constipation. Any procedures since your last visit: Yes, with 80 % relief. He is not on NSAIDS and  is  on anticoagulation medications to include ASA and Plavix and is managed by Dr. Farida Atkins. Pacemaker or defibrillator: No Physician managing device is NA. Medication Contract and Consent for Opioid Use Documents Filed      No documents found                   /60   Pulse 58   Temp 97.5 °F (36.4 °C) (Infrared)   Resp 16   Ht 5' 10\" (1.778 m)   Wt 178 lb (80.7 kg)   SpO2 97%   BMI 25.54 kg/m²      No LMP for male patient.

## 2022-05-12 ENCOUNTER — TELEPHONE (OUTPATIENT)
Dept: PAIN MANAGEMENT | Age: 67
End: 2022-05-12

## 2022-05-12 NOTE — TELEPHONE ENCOUNTER
5/12/22--Sb to patient to verify anticoagulant medications, patient states that Dr. Farida Atkins stopped his plavix at his appointment on 5/10/2022. The patient states he is still taking aspirin 81mg once a day, but no longer on the plavix.     Mark Odom RN  Pain Management

## 2022-05-31 ENCOUNTER — PREP FOR PROCEDURE (OUTPATIENT)
Dept: PAIN MANAGEMENT | Age: 67
End: 2022-05-31

## 2022-06-01 ENCOUNTER — TELEPHONE (OUTPATIENT)
Dept: PAIN MANAGEMENT | Age: 67
End: 2022-06-01

## 2022-06-02 DIAGNOSIS — M48.062 SPINAL STENOSIS OF LUMBAR REGION WITH NEUROGENIC CLAUDICATION: ICD-10-CM

## 2022-06-03 RX ORDER — GABAPENTIN 300 MG/1
300 CAPSULE ORAL 2 TIMES DAILY
Qty: 60 CAPSULE | Refills: 0 | Status: SHIPPED
Start: 2022-06-03 | End: 2022-10-03

## 2022-06-07 ENCOUNTER — OFFICE VISIT (OUTPATIENT)
Dept: PULMONOLOGY | Age: 67
End: 2022-06-07
Payer: MEDICARE

## 2022-06-07 VITALS
OXYGEN SATURATION: 98 % | HEART RATE: 49 BPM | TEMPERATURE: 97.1 F | RESPIRATION RATE: 16 BRPM | DIASTOLIC BLOOD PRESSURE: 70 MMHG | BODY MASS INDEX: 26.2 KG/M2 | WEIGHT: 183 LBS | HEIGHT: 70 IN | SYSTOLIC BLOOD PRESSURE: 115 MMHG

## 2022-06-07 DIAGNOSIS — Z87.891 PERSONAL HISTORY OF TOBACCO USE: ICD-10-CM

## 2022-06-07 DIAGNOSIS — Z87.891 FORMER TOBACCO USE: ICD-10-CM

## 2022-06-07 DIAGNOSIS — J43.8 OTHER EMPHYSEMA (HCC): Primary | ICD-10-CM

## 2022-06-07 LAB
EXPIRATORY TIME: 7.76 SEC
FEF 25-75% %PRED-PRE: 56 L/SEC
FEF 25-75% PRED: 2.56 L/SEC
FEF 25-75%-PRE: 1.45 L/SEC
FEV1 %PRED-PRE: 79 %
FEV1 PRED: 3.23 L
FEV1/FVC %PRED-PRE: 87 %
FEV1/FVC PRED: 77 %
FEV1/FVC: 68 %
FEV1: 2.57 L
FVC %PRED-PRE: 89 %
FVC PRED: 4.23 L
FVC: 3.8 L
PEF %PRED-PRE: 74 L/SEC
PEF PRED: 8.45 L/SEC
PEF-PRE: 6.28 L/SEC

## 2022-06-07 PROCEDURE — G0296 VISIT TO DETERM LDCT ELIG: HCPCS | Performed by: INTERNAL MEDICINE

## 2022-06-07 PROCEDURE — 94010 BREATHING CAPACITY TEST: CPT | Performed by: INTERNAL MEDICINE

## 2022-06-07 PROCEDURE — 1123F ACP DISCUSS/DSCN MKR DOCD: CPT | Performed by: INTERNAL MEDICINE

## 2022-06-07 PROCEDURE — 99213 OFFICE O/P EST LOW 20 MIN: CPT

## 2022-06-07 PROCEDURE — 99213 OFFICE O/P EST LOW 20 MIN: CPT | Performed by: INTERNAL MEDICINE

## 2022-06-07 ASSESSMENT — PULMONARY FUNCTION TESTS
FEV1_PREDICTED: 3.23
FEV1: 2.57
FEV1_PERCENT_PREDICTED_PRE: 79
FEV1/FVC_PERCENT_PREDICTED_PRE: 87
FVC_PREDICTED: 4.23
FVC_PERCENT_PREDICTED_PRE: 89
FEV1/FVC_PREDICTED: 77
FVC: 3.80

## 2022-06-07 NOTE — PROGRESS NOTES
Pt presents for a 6 month follow-up appointment. Denies concerns with breathing at this time. Pt reports hs is not currently using the spiriva due to costs. No changes made in medications at this time. Pt ordered annual CT lung screen to be completed in October. Pt to follow up in clinic in 6 months.

## 2022-06-07 NOTE — PATIENT INSTRUCTIONS
[facility]  [address] [place] [address], [facility] [address]  Office: [phone]      Your were seen in the office today for Emphysema/COPD    We  did not make any changes to your medications today. Testing ordered today was Low Dose Chest CT due Oct 2022    Vaccines recommended Covid-19, Influenza    Please do not hesitate to call the office with any questions. Follow up in 6 months. What is lung cancer screening? Lung cancer screening is a way in which doctors check the lungs for early signs of cancer in people who have no symptoms of lung cancer. A low-dose CT scan uses much less radiation than a normal CT scan and shows a more detailed image of the lungs than a standard X-ray. The goal of lung cancer screening is to find cancer early, before it has a chance to grow, spread, or cause problems. One large study found that smokers who were screened with low-dose CT scans were less likely to die of lung cancer than those who were screened with standard X-ray. Below is a summary of the things you need to know regarding screening for lung cancer with low-dose computed tomography (LDCT). This is a screening program that involves routine annual screening with LDCT studies of the lung. The LDCTs are done using low-dose radiation that is not thought to increase your cancer risk. If you have other serious medical conditions (other cancers, congestive heart failure) that limit your life expectancy to less than 10 years, you should not undergo lung cancer screening with LDCT. The chance is 20%-60% that the LDCT result will show abnormalities. This would require additional testing which could include repeat imaging or even invasive procedures. Most (about 95%) of \"abnormal\" LDCT results are false in the sense that no lung cancer is ultimately found.   Additionally, some (about 10%) of the cancers found would not affect your life expectancy, even if undetected and untreated. If you are still smoking, the single most important thing that you can do to reduce your risk of dying of lung cancer is to quit. For this screening to be covered by Medicare and most other insurers, strict criteria must be met. If you do not meet these criteria, but still wish to undergo LDCT testing, you will be required to sign a waiver indicating your willingness to pay for the scan.

## 2022-06-07 NOTE — PROGRESS NOTES
[facility]     HISTORY OF PRESENT ILLNESS:    Arturo Lake is a 77y.o. year old male here for evaluation of shortness of breath, history of emphysema. The patient reports that overall in the last year he has noticed increased dyspnea with exertion. He reports that he recently had stents placed in his heart after undergoing a stress test.  Initially after having the procedure he participated in cardiac rehab and reports that his breathing did feel better than however he now notices increased shortness of breath with exertion and example of which is when he walks to clocking in the morning and then at other times while he is exerting himself throughout the day. He denies any fevers or chills. Denies a productive cough. He is currently prescribed Trelegy however feels that when he was taking Symbicort and Spiriva in the past this was more helpful. He also denies any wheezing. He is a former smoker and reports he quit 5 years ago. He sees Dr. Jeffie Hodgkin from cardiology and reports that he thinks that he has had an echocardiogram there recently. He has not been vaccinated against COVID-19      Updated HPI as of 12/30/2021. Patient currently denies any symptoms. He reports that he did have a Covid test that was positive on December 21, 2021. He reports that at that time his symptoms consisted mostly of congestion and cough. His daughter also tested positive at the same time. Overall he reports that his shortness of breath has improved. 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 He currently denies any coughing, wheezing, or dyspnea with exertion. He states that he also feels that the Symbicort has helped with his breathing. He is currently on Symbicort, Spiriva, and albuterol as needed.   He is having difficulty affording his prescriptions and we will refer him to the prescription assistance program.    Updated HPI as of June 7, 2022:  Patient seen and examined. Reports that overall he is having no symptoms with his breathing. He reports that he feels like he is doing better when going up and down stairs. 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 Reports that he did recently use Mucinex due to some allergies. He is compliant with his Symbicort he has not had to use his albuterol rescue inhaler recently he has not used his Proventil nebulizer since December. He is currently not taking the Spiriva due to cost.  Upon further discussion the patient does report that he has been diagnosed with glaucoma. Given this and the fact that his symptoms currently seem to be well controlled we will not place him back on Spiriva at this time. ALLERGIES:  No Known Allergies    PAST MEDICAL HISTORY:       Diagnosis Date    Arthritis     Asthma     Breathing difficulty     CAD (coronary artery disease)     COPD (chronic obstructive pulmonary disease) ([facility])     Non-rheumatic tricuspid valve insufficiency     Nonrheumatic mitral (valve) insufficiency        MEDICATIONS:   Current Outpatient Medications   Medication Sig Dispense Refill    gabapentin (NEURONTIN) 300 MG capsule TAKE 1 CAPSULE BY MOUTH IN THE MORNING AND AT BEDTIME FOR 30 DAYS.  INTENDED SUPPLY: 90 DAYS 60 capsule 0    tamsulosin (FLOMAX) 0.4 MG capsule Take 0.4 mg by mouth daily      COMBIGAN 0.2-0.5 % ophthalmic solution Place 1 drop into both eyes 2 times daily      brinzolamide (AZOPT) 1 % ophthalmic suspension Place 1 drop into both eyes nightly      amLODIPine (NORVASC) 2.5 MG tablet Take 1 tablet by mouth daily 90 tablet 0    rosuvastatin (CRESTOR) 20 MG tablet Take 1 tablet by mouth daily 90 tablet 0    budesonide-formoterol (SYMBICORT) 160-4.5 MCG/ACT AERO Inhale 2 puffs into the lungs 2 times daily 3 each 3    albuterol sulfate  (90 Base) MCG/ACT inhaler Inhale 2 puffs into the lungs every 4 hours as needed for Wheezing or Shortness of Breath 3 each 3    albuterol (PROVENTIL) (2.5 MG/3ML) 0.083% nebulizer solution Take 3 mLs by nebulization every 4 hours as needed for Wheezing 360 each 3    SPIRIVA HANDIHALER 18 MCG inhalation capsule inhale contents of 1 capsule by mouth once daily 90 capsule 3    aspirin 81 MG chewable tablet Take 1 tablet by mouth daily 90 tablet 3    latanoprost (XALATAN) 0.005 % ophthalmic solution instill 1 drop into both eyes at bedtime      Arginine 500 MG CAPS Take 2 capsules by mouth 3 times daily       Omega-3 Fatty Acids (FISH OIL) 1200 MG CAPS Take by mouth 2 times daily      Multiple Vitamins-Minerals (THERAPEUTIC MULTIVITAMIN-MINERALS) tablet Take 1 tablet by mouth 2 times daily       No current facility-administered medications for this visit. SOCIAL AND OCCUPATIONAL HEALTH: The patient is a former smoker who quit smoking 5 years ago. 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 He denies any travel history he does not have any pets. SURGICAL HISTORY:   Past Surgical History:   Procedure Laterality Date    APPENDECTOMY      COLONOSCOPY      CORONARY ANGIOPLASTY WITH STENT PLACEMENT  12/22/2020    DR. Mandel Resolute [place] DUANE 2.25x18 Mid LAD, Resolute [place] DUANE 2.5x18 Mid Cx.   EF 65%    HERNIA REPAIR      INTRACAPSULAR CATARACT EXTRACTION Left 4/6/2021    LEFT   CATARACT EXTRACTION  IOL IMPLANT [facility] 5/2/2022    BILATERAL L2 L3 L4 MEDIAL BRACH NERVE BLOCK (CPT 61857) performed by Jeannie Garcia MD at [place] [address]      right wrist carpal tunnel release, right long trigger finger release    OTHER SURGICAL HISTORY Right 01/20/2016    right shoulder arthroscopy acromioplasty with decompression rotator cuff repair  OTHER SURGICAL HISTORY Left 04/06/2021    LEFT EYE CATERACT EXTRACTION WITH INTRA OCULAR IMPLANT WITH I STENT    SHOULDER ARTHROSCOPY  3/4/11    left rotator cuff repair       FAMILY HISTORY: No family history of cancer, blood clots     REVIEW OF SYSTEMS:  Constitutional: No fevers chills unintentional weight loss  Skin: No rashes or lesions  EENT: No change in hearing, change in taste or smell. Cardiovascular: Currently denies chest pain chest pressure or palpitations  Respiration: Currently reports dyspnea on exertion has improved. Denies cough. Denies wheezing. Gastrointestinal: Denies nausea, vomiting, diarrhea  Musculoskeletal: Patient reports a sense of generalized weakness  Neurological: Denies headache, dizziness, seizures  Psychological: Denies anxiety or depression  Endocrine: Denies polyuria polydipsia heat or cold intolerance  Hematopoietic/lymphatic: Positive for easy bruising    PHYSICAL EXAMINATION:  Constitutional: Well-nourished well-developed no acute distress  EENT: PERRL, EOMI  Neck: Trachea and thyroid midline  Respiratory: Clear to auscultation bilaterally  Cardiovascular: Regular rate and rhythm no murmurs rubs or gallops  Pulses: Equal bilaterally  Abdomen: Flat  Lymphatic: No palpable lymphadenopathy  Musculoskeletal: Gait steady  Extremities: No cyanosis, edema  Skin: Typical areas of ecchymosis on the upper extremities bilaterally  Neurological/Psychiatric: Affect appropriate    DATA:Review of spirometry from/2021 as below/spirometry demonstrates FVC of 4.17 L which is 97% of predicted. FEV1 is 3.05 which is 93% of predicted. FEV1 FVC ratio is 73 which is above the lower limit of normal.  MVV is 107 which is 82% of predicted. There is no significant bronchodilator response. SVC is 4.27 which is 100% of predicted. RV is 2.17 which is 93% of predicted. Total lung capacity is 6.44 which is 94% of predicted. Diffusion is 11.58 which is 37% of predicted.   Flow volume loop does show minimal scooping in the expiratory limb. Overall pulmonary function testing is negative for restriction or obstruction. There is no evidence of air trapping. There is a moderate reduction in DLCO. Spirometry as of June 7, 2022:  FVC is 3.8 L which is 89% of predicted. FEV1 is 2.57 L which is 79% of predicted. FEV1 FVC ratio 68. MVV is 69 which is 53% of predicted. SVC is 3.54 L which is 83% of predicted. Flow volume loop shows scooping consistent with obstruction. Overall FEV1 has slightly decreased from full pulmonary function testing which was done in May 21. However patient does seem significantly better from a symptomatic standpoint this may simply be a difference in the method of testing. IMPRESSION:       Former tobacco use  History of CAD  COPD Gold stage II  Vaccine counseling         PLAN:      Continue Symbicort and as needed rescue inhaler  Patient is continuing to follow-up with cardiology  Continues to decline COVID-19 vaccination. follow-up low-dose lung cancer screening CT due October of this year. I hope this updates you on my evaluation and clinical thinking. Thank you for allowing me to participate in his care. Sincerely,        Sari Woodson.  Office: [phone]  Fax: [phone]    Low Dose CT (LDCT) Lung Screening criteria met   Age 50-69   Pack year smoking >30   Still smoking or less than 15 year since quit   No sign or symptoms of lung cancer   > 11 months since last LDCT     Risks and benefits of lung cancer screening with LDCT scans discussed:    Significance of positive screen - False-positive LDCT results often occur. 95% of all positive results do not lead to a diagnosis of cancer. Usually further imaging can resolve most false-positive results; however, some patients may require invasive procedures.     Over diagnosis risk - 10% to 12% of screen-detected lung cancer cases are over diagnosed--that is, the cancer would not have been detected in the patient's lifetime without the screening. Need for follow up screens annually to continue lung cancer screening effectiveness     Risks associated with radiation from annual LDCT- Radiation exposure is about the same as for a mammogram, which is about 1/3 of the annual background radiation exposure from everyday life. Starting screening at age 54 is not likely to increase cancer risk from radiation exposure. Patients with comorbidities resulting in life expectancy of < 10 years, or that would preclude treatment of an abnormality identified on CT, should not be screened due to lack of benefit. To obtain maximal benefit from this screening, smoking cessation and long-term abstinence from smoking is critical          Low Dose CT (LDCT) Lung Screening criteria met:     Age 50-77(Medicare) or 50-80 ([facility])   Pack year smoking >20   Still smoking or less than 15 year since quit   No sign or symptoms of lung cancer   > 11 months since last LDCT     Risks and benefits of lung cancer screening with LDCT scans discussed:    Significance of positive screen - False-positive LDCT results often occur. 95% of all positive results do not lead to a diagnosis of cancer. Usually further imaging can resolve most false-positive results; however, some patients may require invasive procedures. Over diagnosis risk - 10% to 12% of screen-detected lung cancer cases are over diagnosed--that is, the cancer would not have been detected in the patient's lifetime without the screening. Need for follow up screens annually to continue lung cancer screening effectiveness     Risks associated with radiation from annual LDCT- Radiation exposure is about the same as for a mammogram, which is about 1/3 of the annual background radiation exposure from everyday life. Starting screening at age 54 is not likely to increase cancer risk from radiation exposure.     Patients with comorbidities resulting in life expectancy of < 10 years, or that would preclude treatment of an abnormality identified on CT, should not be screened due to lack of benefit.     To obtain maximal benefit from this screening, smoking cessation and long-term abstinence from smoking is critical

## 2022-06-10 ENCOUNTER — HOSPITAL ENCOUNTER (EMERGENCY)
Age: 67
Discharge: HOME | End: 2022-06-10
Attending: STUDENT IN AN ORGANIZED HEALTH CARE EDUCATION/TRAINING PROGRAM
Payer: COMMERCIAL

## 2022-06-10 VITALS
OXYGEN SATURATION: 95 % | DIASTOLIC BLOOD PRESSURE: 68 MMHG | HEART RATE: 62 BPM | TEMPERATURE: 97.9 F | RESPIRATION RATE: 18 BRPM | SYSTOLIC BLOOD PRESSURE: 150 MMHG

## 2022-06-10 DIAGNOSIS — S05.01XA ABRASION OF RIGHT CORNEA, INITIAL ENCOUNTER: Primary | ICD-10-CM

## 2022-06-10 PROCEDURE — 6370000000 HC RX 637 (ALT 250 FOR IP): Performed by: STUDENT IN AN ORGANIZED HEALTH CARE EDUCATION/TRAINING PROGRAM

## 2022-06-10 PROCEDURE — 99283 EMERGENCY DEPT VISIT LOW MDM: CPT

## 2022-06-10 RX ORDER — ERYTHROMYCIN 5 MG/G
OINTMENT OPHTHALMIC
Qty: 1 G | Refills: 0 | Status: SHIPPED | OUTPATIENT
Start: 2022-06-10 | End: 2022-06-20

## 2022-06-10 RX ORDER — TETRACAINE HYDROCHLORIDE 5 MG/ML
1 SOLUTION OPHTHALMIC ONCE
Status: COMPLETED | OUTPATIENT
Start: 2022-06-10 | End: 2022-06-10

## 2022-06-10 RX ADMIN — FLUORESCEIN SODIUM 1 EACH: 0.6 STRIP OPHTHALMIC at 15:07

## 2022-06-10 RX ADMIN — TETRACAINE HYDROCHLORIDE 1 DROP: 5 SOLUTION OPHTHALMIC at 15:08

## 2022-06-10 ASSESSMENT — ENCOUNTER SYMPTOMS
BACK PAIN: 0
SINUS PAIN: 0
EYE PAIN: 1
SORE THROAT: 0
DIARRHEA: 0
SHORTNESS OF BREATH: 0
ABDOMINAL PAIN: 0
WHEEZING: 0
VOMITING: 0
COUGH: 0
EYE REDNESS: 0
NAUSEA: 0

## 2022-06-10 ASSESSMENT — PAIN SCALES - GENERAL: PAINLEVEL_OUTOF10: 6

## 2022-06-10 ASSESSMENT — VISUAL ACUITY
OU: 20/50
OD: 20/50
OS: 20/50

## 2022-06-10 NOTE — ED PROVIDER NOTES
Chief Complaint   Patient presents with    Foreign Body     somnething blew into his eye yesterday at work.  has eye drops at home worked last night but not today       70-year-old gentleman with past medical history of CAD, COPD presenting today with chief complaint of feeling as if he has a foreign body in his eye. He was at work yesterday and felt as though something flew into his eye but he is not sure what it was. His eye has felt scratchy since then, nothing is made it better or worse, not associated with double or blurry vision or any other changes. He does not wear glasses or contacts. He feels as though it is scratchy and is having a hard time keeping his eye open, this is never happened before. No other acute complaints. Review of Systems   Constitutional: Negative for chills and fever. HENT: Negative for ear pain, sinus pain and sore throat. Eyes: Positive for pain. Negative for redness. Respiratory: Negative for cough, shortness of breath and wheezing. Cardiovascular: Negative for chest pain. Gastrointestinal: Negative for abdominal pain, diarrhea, nausea and vomiting. Genitourinary: Negative for dysuria and flank pain. Musculoskeletal: Negative for back pain and neck pain. Skin: Negative for rash. Neurological: Negative for seizures and headaches. Hematological: Negative for adenopathy. All other systems reviewed and are negative. Physical Exam  Vitals and nursing note reviewed. Constitutional:       General: He is not in acute distress. Appearance: He is well-developed. HENT:      Head: Normocephalic and atraumatic. Right Ear: External ear normal.      Left Ear: External ear normal.      Mouth/Throat:      Mouth: Mucous membranes are moist.      Pharynx: Oropharynx is clear. Eyes:      Pupils: Pupils are equal, round, and reactive to light.       Comments: Conjunctive a is minimally injected, no foreign body noted with there were 2 corneal 01/20/2016); Colonoscopy; Coronary angioplasty with stent (12/22/2020); other surgical history (Left, 04/06/2021); Intracapsular cataract extraction (Left, 4/6/2021); and Nerve Block (Bilateral, 5/2/2022). Social History:  reports that he quit smoking about 6 years ago. His smoking use included cigarettes. He started smoking about 53 years ago. He has a 60.00 pack-year smoking history. He has never used smokeless tobacco. He reports that he does not drink alcohol and does not use drugs. Family History: family history includes Arrhythmia in his sister; Diabetes in his father; Heart Attack in his father; Heart Disease in his father; High Blood Pressure in his father; High Cholesterol in his father; No Known Problems in his mother. The patients home medications have been reviewed. Allergies: Patient has no known allergies. -------------------------------------------------- RESULTS -------------------------------------------------  Labs:  No results found for this visit on 06/10/22. Radiology:  No orders to display       ------------------------- NURSING NOTES AND VITALS REVIEWED ---------------------------  Date / Time Roomed:  6/10/2022  2:37 PM  ED Bed Assignment:  [id number]    The nursing notes within the ED encounter and vital signs as below have been reviewed. BP (!) 150/68   Pulse 62   Temp 97.9 °F (36.6 °C) (Oral)   Resp 18   SpO2 95%   Oxygen Saturation Interpretation: Normal      ------------------------------------------ PROGRESS NOTES ------------------------------------------  I have spoken with the patient and discussed todays results, in addition to providing specific details for the plan of care and counseling regarding the diagnosis and prognosis. Their questions are answered at this time and they are agreeable with the plan. I discussed at length with them reasons for immediate return here for re evaluation.  They will followup with primary care by calling their office tomorrow. --------------------------------- ADDITIONAL PROVIDER NOTES ---------------------------------  At this time the patient is without objective evidence of an acute process requiring hospitalization or inpatient management. They have remained hemodynamically stable throughout their entire ED visit and are stable for discharge with outpatient follow-up. The plan has been discussed in detail and they are aware of the specific conditions for emergent return, as well as the importance of follow-up. Discharge Medication List as of 6/10/2022  4:15 PM      START taking these medications    Details   erythromycin (ROMYCIN) 5 MG/GM ophthalmic ointment Instill 4 times daily. , Disp-1 g, R-0, Print             Diagnosis:  1. Abrasion of right cornea, initial encounter        Disposition:  Patient's disposition: Discharge to home  Patient's condition is stable.            Gregory Singer DO  Resident  06/11/22 0118

## 2022-06-13 ENCOUNTER — HOSPITAL ENCOUNTER (OUTPATIENT)
Age: 67
Setting detail: OUTPATIENT SURGERY
Discharge: HOME | End: 2022-06-13
Attending: PAIN MEDICINE | Admitting: PAIN MEDICINE
Payer: MEDICARE

## 2022-06-13 ENCOUNTER — HOSPITAL ENCOUNTER (OUTPATIENT)
Dept: OPERATING ROOM | Age: 67
Setting detail: OUTPATIENT SURGERY
Discharge: HOME | End: 2022-06-13
Attending: PAIN MEDICINE
Payer: MEDICARE

## 2022-06-13 VITALS
HEART RATE: 61 BPM | RESPIRATION RATE: 16 BRPM | DIASTOLIC BLOOD PRESSURE: 63 MMHG | SYSTOLIC BLOOD PRESSURE: 115 MMHG | WEIGHT: 178 LBS | TEMPERATURE: 97.5 F | OXYGEN SATURATION: 96 % | HEIGHT: 70 IN | BODY MASS INDEX: 25.48 KG/M2

## 2022-06-13 DIAGNOSIS — M47.896 OTHER OSTEOARTHRITIS OF SPINE, LUMBAR REGION: ICD-10-CM

## 2022-06-13 PROCEDURE — 7100000011 HC PHASE II RECOVERY - ADDTL 15 MIN: Performed by: PAIN MEDICINE

## 2022-06-13 PROCEDURE — 64494 INJ PARAVERT F JNT L/S 2 LEV: CPT | Performed by: PAIN MEDICINE

## 2022-06-13 PROCEDURE — 3209999900 FLUORO FOR SURGICAL PROCEDURES

## 2022-06-13 PROCEDURE — 64493 INJ PARAVERT F JNT L/S 1 LEV: CPT | Performed by: PAIN MEDICINE

## 2022-06-13 PROCEDURE — 2500000003 HC RX 250 WO HCPCS: Performed by: PAIN MEDICINE

## 2022-06-13 PROCEDURE — 3600000005 HC SURGERY LEVEL 5 BASE: Performed by: PAIN MEDICINE

## 2022-06-13 PROCEDURE — 2709999900 HC NON-CHARGEABLE SUPPLY: Performed by: PAIN MEDICINE

## 2022-06-13 PROCEDURE — 7100000010 HC PHASE II RECOVERY - FIRST 15 MIN: Performed by: PAIN MEDICINE

## 2022-06-13 PROCEDURE — 6360000002 HC RX W HCPCS: Performed by: PAIN MEDICINE

## 2022-06-13 RX ORDER — SODIUM CHLORIDE 0.9 % (FLUSH) 0.9 %
5-40 SYRINGE (ML) INJECTION EVERY 12 HOURS SCHEDULED
Status: DISCONTINUED | OUTPATIENT
Start: 2022-06-13 | End: 2022-06-13 | Stop reason: HOSPADM

## 2022-06-13 RX ORDER — SODIUM CHLORIDE 9 MG/ML
INJECTION, SOLUTION INTRAVENOUS PRN
Status: DISCONTINUED | OUTPATIENT
Start: 2022-06-13 | End: 2022-06-13 | Stop reason: HOSPADM

## 2022-06-13 RX ORDER — SODIUM CHLORIDE 0.9 % (FLUSH) 0.9 %
5-40 SYRINGE (ML) INJECTION PRN
Status: DISCONTINUED | OUTPATIENT
Start: 2022-06-13 | End: 2022-06-13 | Stop reason: HOSPADM

## 2022-06-13 RX ORDER — LIDOCAINE HYDROCHLORIDE 5 MG/ML
INJECTION, SOLUTION INFILTRATION; INTRAVENOUS PRN
Status: DISCONTINUED | OUTPATIENT
Start: 2022-06-13 | End: 2022-06-13 | Stop reason: ALTCHOICE

## 2022-06-13 ASSESSMENT — PAIN SCALES - GENERAL
PAINLEVEL_OUTOF10: 0
PAINLEVEL_OUTOF10: 0

## 2022-06-13 ASSESSMENT — PAIN - FUNCTIONAL ASSESSMENT
PAIN_FUNCTIONAL_ASSESSMENT: 0-10
PAIN_FUNCTIONAL_ASSESSMENT: PREVENTS OR INTERFERES SOME ACTIVE ACTIVITIES AND ADLS

## 2022-06-13 ASSESSMENT — PAIN DESCRIPTION - DESCRIPTORS: DESCRIPTORS: ACHING

## 2022-06-13 NOTE — H&P
[address]  [address] [facility], [address] [place] [address] [place]  [phone]    Procedure History & Physical      Valene Part     HPI:    Patient  is here for axial low back pain for bilateral L3,4,5 MBB  Labs/imaging studies reviewed   All question and concerns addressed including R/B/A associated with the procedure    Past Medical History:   Diagnosis Date    Arthritis     Asthma     Breathing difficulty     CAD (coronary artery disease)     COPD (chronic obstructive pulmonary disease) ([address]     Non-rheumatic tricuspid valve insufficiency     Nonrheumatic mitral (valve) insufficiency        Past Surgical History:   Procedure Laterality Date    APPENDECTOMY      COLONOSCOPY      CORONARY ANGIOPLASTY WITH STENT PLACEMENT  12/22/2020    DR. Mandel Resolute [place] DUANE 2.25x18 Mid LAD, Resolute Christopher DUANE 2.5x18 Mid Cx. EF 65%    HERNIA REPAIR      INTRACAPSULAR CATARACT EXTRACTION Left 4/6/2021    LEFT   CATARACT EXTRACTION  IOL IMPLANT I STENT performed by Eric Jimenez MD at [facility] Bilateral 5/2/2022    BILATERAL L2 L3 L4 MEDIAL BRACH NERVE BLOCK (CPT 21188) performed by Patricia Mckay MD at [place] [address]      right wrist carpal tunnel release, right long trigger finger release    OTHER SURGICAL HISTORY Right 01/20/2016    right shoulder arthroscopy acromioplasty with decompression rotator cuff repair     OTHER SURGICAL HISTORY Left 04/06/2021    LEFT EYE CATERACT EXTRACTION WITH INTRA OCULAR IMPLANT WITH I STENT    SHOULDER ARTHROSCOPY  3/4/11    left rotator cuff repair       Prior to Admission medications    Medication Sig Start Date End Date Taking? Authorizing Provider   erythromycin (ROMYCIN) 5 MG/GM ophthalmic ointment Instill 4 times daily. 6/10/22 6/20/22  Brianna Moses,    gabapentin (NEURONTIN) 300 MG capsule TAKE 1 CAPSULE BY MOUTH IN THE MORNING AND AT BEDTIME FOR 30 DAYS.  INTENDED SUPPLY: 90 DAYS 6/3/22 7/3/22  ARSEN Crow   tamsulosin (FLOMAX) 0.4 MG capsule Take 0.4 mg by mouth daily 4/7/22   Historical Provider, MD   COMBIGAN 0.2-0.5 % ophthalmic solution Place 1 drop into both eyes 2 times daily 4/12/22   Historical Provider, MD   brinzolamide (AZOPT) 1 % ophthalmic suspension Place 1 drop into both eyes nightly 4/22/22   Historical Provider, MD   amLODIPine (NORVASC) 2.5 MG tablet Take 1 tablet by mouth daily 3/17/22   Cisco Weir MD   rosuvastatin (CRESTOR) 20 MG tablet Take 1 tablet by mouth daily 3/17/22   Ermelinda Armstrong MD   budesonide-formoterol (SYMBICORT) 160-4.5 MCG/ACT AERO Inhale 2 puffs into the lungs 2 times daily 12/30/21   Hoa Alva, DO   albuterol sulfate  (90 Base) MCG/ACT inhaler Inhale 2 puffs into the lungs every 4 hours as needed for Wheezing or Shortness of Breath 12/30/21   Bhavani Alva, DO   albuterol (PROVENTIL) (2.5 MG/3ML) 0.083% nebulizer solution Take 3 mLs by nebulization every 4 hours as needed for Wheezing 12/30/21   Bhavani Cardenas, DO   SPIRIVA HANDIHALER 18 MCG inhalation capsule inhale contents of 1 capsule by mouth once daily 12/30/21   Bhavani Alva DO   aspirin 81 MG chewable tablet Take 1 tablet by mouth daily 4/23/21   Triston Osborn MD   latanoprost (XALATAN) 0.005 % ophthalmic solution instill 1 drop into both eyes at bedtime 4/15/21   Historical Provider, MD   Arginine 500 MG CAPS Take 2 capsules by mouth 3 times daily     Historical Provider, MD   Omega-3 Fatty Acids (FISH OIL) 1200 MG CAPS Take by mouth 2 times daily    Historical Provider, MD   Multiple Vitamins-Minerals (THERAPEUTIC MULTIVITAMIN-MINERALS) tablet Take 1 tablet by mouth 2 times daily    Historical Provider, MD       No Known Allergies    Social History     Socioeconomic History    Marital status:      Spouse name: Not on file    Number of children: Not on file    Years of education: Not on file    Highest education level: Not on file Occupational History    Not on file   Tobacco Use    Smoking status: Former Smoker     Packs/day: 1.50     Years: 40.00     Pack years: 60.00     Types: Cigarettes     Start date:      Quit date: 2016     Years since quittin.4    Smokeless tobacco: Never Used   Vaping Use    Vaping Use: Never used   Substance and Sexual Activity    Alcohol use: No     Comment: 6 cups a day of coffee    Drug use: No    Sexual activity: Not on file   Other Topics Concern    Not on file   Social History Narrative    Not on file     Social Determinants of Health     Financial Resource Strain: Medium Risk    Difficulty of Paying Living Expenses: Somewhat hard   Food Insecurity: No Food Insecurity    Worried About Running Out of Food in the Last Year: Never true    Ileana of Food in the Last Year: Never true   Transportation Needs:     Lack of Transportation (Medical): Not on file    Lack of Transportation (Non-Medical):  Not on file   Physical Activity:     Days of Exercise per Week: Not on file    Minutes of Exercise per Session: Not on file   Stress:     Feeling of Stress : Not on file   Social Connections:     Frequency of Communication with Friends and Family: Not on file    Frequency of Social Gatherings with Friends and Family: Not on file    Attends Rastafarian Services: Not on file    Active Member of [address] [place] [address] [employer] or Organizations: Not on file    Attends Club or Organization Meetings: Not on file    Marital Status: Not on file   Intimate Partner Violence:     Fear of Current or Ex-Partner: Not on file    Emotionally Abused: Not on file    Physically Abused: Not on file    Sexually Abused: Not on file   Housing Stability:     Unable to Pay for Housing in the Last Year: Not on file    Number of [place] in the Last Year: Not on file    Unstable Housing in the Last Year: Not on file       Family History   Problem Relation Age of Onset    No Known Problems Mother     Heart Disease Father    [facility] Heart Attack Father     High Blood Pressure Father     High Cholesterol Father     Diabetes Father     Arrhythmia Sister          REVIEW OF SYSTEMS:    CONSTITUTIONAL:  negative for  fevers, chills, sweats and fatigue    RESPIRATORY:  negative for  dry cough, cough with sputum, dyspnea, wheezing and chest pain    CARDIOVASCULAR:  negative for chest pain, dyspnea, palpitations, syncope    GASTROINTESTINAL:  negative for nausea, vomiting, change in bowel habits, diarrhea, constipation and abdominal pain    MUSCULOSKELETAL: negative for muscle weakness    SKIN: negative for itching or rashes. BEHAVIOR/PSYCH:  negative for poor appetite, increased appetite, decreased sleep and poor concentration    All other systems negative      PHYSICAL EXAM:    VITALS:  /69   Pulse 66   Temp 97.5 °F (36.4 °C) (Temporal)   Resp 16   Ht 5' 10\" (1.778 m)   Wt 178 lb (80.7 kg)   SpO2 96%   BMI 25.54 kg/m²     CONSTITUTIONAL:  awake, alert, cooperative, no apparent distress, and appears stated age    EYES: PERRLA, EOMI    LUNGS:  No increased work of breathing, no audible wheezing    CARDIOVASCULAR:  regular rate and rhythm    ABDOMEN:  Soft non tender non distended     EXTREMITIES: no signs of clubbing or cyanosis. MUSCULOSKELETAL: negative for flaccid muscle tone or spastic movements. SKIN: gross examination reveals no signs of rashes, or diaphoresis. NEURO: Cranial nerves II-XII grossly intact. No signs of agitated mood. Assessment/Plan:    Axial low back pain for bilateral L3,4,5 MBB.

## 2022-06-13 NOTE — OP NOTE
2022    Patient: Marianne Mejia  :  1955  Age:  77 y.o. Sex:  male     PRE-OPERATIVE DIAGNOSIS: Bilateral Lumbar spondylosis, lumbar facet syndrome. POST-OPERATIVE DIAGNOSIS: Same. PROCEDURE:  # 1 Fluoroscopic guided lumbar medial branch blocks Bilateral at Levels: L3, L4, L5 MBB. SURGEON: MARIELLA Bojorquez M.D. ANESTHESIA: Local    ESTIMATED BLOOD LOSS: None.  ______________________________________________________________________  BRIEF HISTORY:  Marianne Mejia comes in today for 1 fluoroscopic guided lumbar medial branch blocks  Bilateral  at Levels: L3, L4, L5 MBB. The potential complications of this procedure were discussed with him again today. He has elected to undergo the aforementioned procedure. Cristian complete History & Physical examination were reviewed in depth, a copy of which is in the chart. DESCRIPTION OF PROCEDURE:   After confirming written and informed consent, a time-out was performed and Ashley Spltristin name and date of birth, the procedure to be performed as well as the plan for the location of the needle insertion were confirmed. The patient was brought into the procedure room and placed in the prone position on the fluoroscopy table. Standard monitors were placed and vital signs were observed throughout the procedure. The area of the lumbar spine was prepped with chloraprep and draped in a sterile manner. AP fluoroscopy was used to identify and reggie bartons point at the targeted levels. The skin and subcutaneous tissues in these identified areas were anesthetized with 0.5%Lidocaine. A 22 # gauge 5 inch spinal needle was advanced toward the junction of the superior articular process and the transverse process, along the course of the medial branch. Satisfactory needle placement was confirmed by AP and oblique projections.   At the sacral alar level, the sacral alar region was visualized and the needle tip was positioned on the sacral alar at the base of the superior articulating process where the medial branch traverses under fluoroscopic guidance. Once bone was contacted and negative aspiration was confirmed. A solution of 0.25% marcaine with 40 DepoMedrol 3 cc was then injected and distributed equally at each level. Following the procedure Johnathanhansel Parmar noted improvement of previous pain symptoms. Disposition the patient tolerated the procedure well and there were no complications . Vital signs remained stable throughout the procedure. The patient was escorted to the recovery area where they remained until discharge and written discharge instructions for the procedure were given. Plan: Johnathan Parmar will return to our pain management center as scheduled.      Yuridia Fields MD

## 2022-06-21 DIAGNOSIS — E78.2 MIXED HYPERLIPIDEMIA: ICD-10-CM

## 2022-06-21 RX ORDER — ROSUVASTATIN CALCIUM 20 MG/1
20 TABLET, COATED ORAL DAILY
Qty: 90 TABLET | Refills: 0 | Status: SHIPPED
Start: 2022-06-21 | End: 2022-10-03 | Stop reason: SDUPTHER

## 2022-06-22 ENCOUNTER — OFFICE VISIT (OUTPATIENT)
Dept: PAIN MANAGEMENT | Age: 67
End: 2022-06-22
Payer: MEDICARE

## 2022-06-22 VITALS
BODY MASS INDEX: 26.2 KG/M2 | TEMPERATURE: 97.5 F | HEART RATE: 60 BPM | SYSTOLIC BLOOD PRESSURE: 110 MMHG | DIASTOLIC BLOOD PRESSURE: 68 MMHG | WEIGHT: 183 LBS | HEIGHT: 70 IN | OXYGEN SATURATION: 97 %

## 2022-06-22 DIAGNOSIS — E78.2 MIXED HYPERLIPIDEMIA: ICD-10-CM

## 2022-06-22 DIAGNOSIS — G89.4 CHRONIC PAIN SYNDROME: ICD-10-CM

## 2022-06-22 DIAGNOSIS — M47.816 LUMBAR SPONDYLOSIS: ICD-10-CM

## 2022-06-22 DIAGNOSIS — I10 ESSENTIAL HYPERTENSION: ICD-10-CM

## 2022-06-22 DIAGNOSIS — M48.062 SPINAL STENOSIS OF LUMBAR REGION WITH NEUROGENIC CLAUDICATION: Primary | ICD-10-CM

## 2022-06-22 DIAGNOSIS — M47.816 LUMBAR FACET ARTHROPATHY: ICD-10-CM

## 2022-06-22 DIAGNOSIS — M51.9 LUMBAR DISC DISORDER: ICD-10-CM

## 2022-06-22 LAB
ALBUMIN SERPL-MCNC: 4.3 G/DL (ref 3.5–5.2)
ALP BLD-CCNC: 53 U/L (ref 40–129)
ALT SERPL-CCNC: 30 U/L (ref 0–40)
ANION GAP SERPL CALCULATED.3IONS-SCNC: 16 MMOL/L (ref 7–16)
AST SERPL-CCNC: 27 U/L (ref 0–39)
BILIRUB SERPL-MCNC: 0.7 MG/DL (ref 0–1.2)
BUN BLDV-MCNC: 12 MG/DL (ref 6–23)
CALCIUM SERPL-MCNC: 9 MG/DL (ref 8.6–10.2)
CHLORIDE BLD-SCNC: 105 MMOL/L (ref 98–107)
CHOLESTEROL, TOTAL: 125 MG/DL (ref 0–199)
CO2: 21 MMOL/L (ref 22–29)
CREAT SERPL-MCNC: 0.8 MG/DL (ref 0.7–1.2)
GFR AFRICAN AMERICAN: >60
GFR NON-AFRICAN AMERICAN: >60 ML/MIN/1.73
GLUCOSE BLD-MCNC: 102 MG/DL (ref 74–99)
HDLC SERPL-MCNC: 46 MG/DL
LDL CHOLESTEROL CALCULATED: 64 MG/DL (ref 0–99)
POTASSIUM SERPL-SCNC: 3.6 MMOL/L (ref 3.5–5)
SODIUM BLD-SCNC: 142 MMOL/L (ref 132–146)
TOTAL PROTEIN: 7.1 G/DL (ref 6.4–8.3)
TRIGL SERPL-MCNC: 77 MG/DL (ref 0–149)
VLDLC SERPL CALC-MCNC: 15 MG/DL

## 2022-06-22 PROCEDURE — 1123F ACP DISCUSS/DSCN MKR DOCD: CPT | Performed by: PAIN MEDICINE

## 2022-06-22 PROCEDURE — 99213 OFFICE O/P EST LOW 20 MIN: CPT | Performed by: PAIN MEDICINE

## 2022-06-22 PROCEDURE — 99214 OFFICE O/P EST MOD 30 MIN: CPT | Performed by: PAIN MEDICINE

## 2022-06-22 RX ORDER — SODIUM CHLORIDE 0.9 % (FLUSH) 0.9 %
5-40 SYRINGE (ML) INJECTION PRN
Status: CANCELLED | OUTPATIENT
Start: 2022-06-22

## 2022-06-22 RX ORDER — SODIUM CHLORIDE 9 MG/ML
INJECTION, SOLUTION INTRAVENOUS PRN
Status: CANCELLED | OUTPATIENT
Start: 2022-06-22

## 2022-06-22 RX ORDER — SODIUM CHLORIDE 0.9 % (FLUSH) 0.9 %
5-40 SYRINGE (ML) INJECTION EVERY 12 HOURS SCHEDULED
Status: CANCELLED | OUTPATIENT
Start: 2022-06-22

## 2022-06-22 NOTE — PROGRESS NOTES
Do you currently have any of the following:    Fever: No  Headache:  No  Cough: No  Shortness of breath: No  Exposed to anyone with these symptoms: No                                                                                                                Karl Shaver presents to the [facility] on 6/22/2022. Carissa is complaining of pain in back. The pain is constant. The pain is described as pressure. Pain is rated on his best day at a 0, on his worst day at a 0, and on average at a 0 on the VAS scale. He took his last dose of Neurontin yesterday. Carissa does not have issues with constipation. Any procedures since your last visit: Yes, with 80 % relief. He is on NSAIDS and  is  on anticoagulation medications to include ASA and is managed by Leslee Fernandes MD  .     Pacemaker or defibrillator: No.    Medication Contract and Consent for Opioid Use Documents Filed      No documents found                   Temp 97.5 °F (36.4 °C) (Infrared)   Ht 5' 10\" (1.778 m)   Wt 183 lb (83 kg)   BMI 26.26 kg/m²      No LMP for male patient.

## 2022-06-22 NOTE — PROGRESS NOTES
[address] [facility], [address] [place] [address]  [phone]    Follow up Note      Taisha Ferreira     Date of Visit:  6/22/2022    CC:  Patient presents for follow up   Chief Complaint   Patient presents with    Follow Up After Procedure     # 1 Fluoroscopic guided lumbar medial branch blocks Bilateral at Levels: L3, L4, L5 MBB. HPI:    Pain is better. Appropriate analgesia with current medications regimen: N/A. Change in quality of symptoms:no. Medication side effects:not applicable . Recent diagnostic testing:none. Recent interventional procedures:Repeat Bilateral L3,4,5 MBB with more than 80% improvement in his pain for more than 5 days. He has been on anticoagulation medications to include ASA and Plavix. The patient  has not been on herbal supplements. The patient is not diabetic. Imaging:   Lumbar spine MRI   1. Significant multilevel spinal stenosis most severe L3-4.   2. Soft tissue structure along the right posterior margin of the L3   vertebra could be disc fragment or relatively less likely mass. Patient will be recalled for postcontrast imaging through this region   and an addendum issued. Previous treatments: Physical Therapy and medications. .          Potential Aberrant Drug-Related Behavior:    No    Urine Drug Screening:  No    OARRS report:  06/2022 consistent     Opioid Agreement:  Renewal date:N/A    Past Medical History:   Diagnosis Date    Arthritis     Asthma     Breathing difficulty     CAD (coronary artery disease)     COPD (chronic obstructive pulmonary disease) ([facility] [address]     Non-rheumatic tricuspid valve insufficiency     Nonrheumatic mitral (valve) insufficiency      Past Surgical History:   Procedure Laterality Date    APPENDECTOMY      COLONOSCOPY      CORONARY ANGIOPLASTY WITH STENT PLACEMENT  12/22/2020    DR. Mandel Resolute Christopher DUANE 2.25x18 Mid LAD, Resolute [place] DUANE 2.5x18 Mid Cx.   EF 65%    HERNIA REPAIR  INTRACAPSULAR CATARACT EXTRACTION Left 4/6/2021    LEFT   CATARACT EXTRACTION  IOL IMPLANT I STENT performed by Lia Reyes MD at [facility] Bilateral 5/2/2022    BILATERAL L2 L3 L4 MEDIAL BRACH NERVE BLOCK (CPT 44757) performed by Janine Ocampo MD at [facility] Bilateral 6/13/2022    BILATERAL L3 L4 L5 MEDIAL BRANCH NERVE BLOCK performed by Janine Ocampo MD at [place] [id number]      right wrist carpal tunnel release, right long trigger finger release    OTHER SURGICAL HISTORY Right 01/20/2016    right shoulder arthroscopy acromioplasty with decompression rotator cuff repair     OTHER SURGICAL HISTORY Left 04/06/2021    LEFT EYE CATERACT EXTRACTION WITH INTRA OCULAR IMPLANT WITH I STENT    SHOULDER ARTHROSCOPY  3/4/11    left rotator cuff repair     Prior to Admission medications    Medication Sig Start Date End Date Taking? Authorizing Provider   rosuvastatin (CRESTOR) 20 MG tablet Take 1 tablet by mouth daily 6/21/22  Yes Ermelinda Armstrong MD   gabapentin (NEURONTIN) 300 MG capsule TAKE 1 CAPSULE BY MOUTH IN THE MORNING AND AT BEDTIME FOR 30 DAYS.  INTENDED SUPPLY: 90 DAYS 6/3/22 7/3/22 Yes ARSEN Renteria   tamsulosin (FLOMAX) 0.4 MG capsule Take 0.4 mg by mouth daily 4/7/22  Yes Historical Provider, MD   COMBIGAN 0.2-0.5 % ophthalmic solution Place 1 drop into both eyes 2 times daily 4/12/22  Yes Historical Provider, MD   brinzolamide (AZOPT) 1 % ophthalmic suspension Place 1 drop into both eyes nightly 4/22/22  Yes Historical Provider, MD   amLODIPine (NORVASC) 2.5 MG tablet Take 1 tablet by mouth daily 3/17/22  Yes Ermelinda Armstrong MD   budesonide-formoterol (SYMBICORT) 160-4.5 MCG/ACT AERO Inhale 2 puffs into the lungs 2 times daily 12/30/21  Yes Hoa Alva DO   albuterol sulfate  (90 Base) MCG/ACT inhaler Inhale 2 puffs into the lungs every 4 hours as needed for Wheezing or Shortness of Breath 12/30/21  Yes Hoa Alva DO   albuterol (PROVENTIL) (2.5 MG/3ML) 0.083% nebulizer solution Take 3 mLs by nebulization every 4 hours as needed for Wheezing 21  Yes Hoa Alva DO   SPIRIVA HANDIHALER 18 MCG inhalation capsule inhale contents of 1 capsule by mouth once daily 21  Yes Hoa Alva DO   aspirin 81 MG chewable tablet Take 1 tablet by mouth daily 21  Yes Pepe Pathak MD   latanoprost (XALATAN) 0.005 % ophthalmic solution instill 1 drop into both eyes at bedtime 4/15/21  Yes Historical Provider, MD   Arginine 500 MG CAPS Take 2 capsules by mouth 3 times daily    Yes Historical Provider, MD   Omega-3 Fatty Acids (FISH OIL) 1200 MG CAPS Take by mouth 2 times daily   Yes Historical Provider, MD   Multiple Vitamins-Minerals (THERAPEUTIC MULTIVITAMIN-MINERALS) tablet Take 1 tablet by mouth 2 times daily   Yes Historical Provider, MD     No Known Allergies    Social History     Socioeconomic History    Marital status:      Spouse name: Not on file    Number of children: Not on file    Years of education: Not on file    Highest education level: Not on file   Occupational History    Not on file   Tobacco Use    Smoking status: Former Smoker     Packs/day: 1.50     Years: 40.00     Pack years: 60.00     Types: Cigarettes     Start date:      Quit date:      Years since quittin.4    Smokeless tobacco: Never Used   Vaping Use    Vaping Use: Never used   Substance and Sexual Activity    Alcohol use: No     Comment: 6 cups a day of coffee    Drug use: No    Sexual activity: Not on file   Other Topics Concern    Not on file   Social History Narrative    Not on file     Social Determinants of Health     Financial Resource Strain: Medium Risk    Difficulty of Paying Living Expenses: Somewhat hard   Food Insecurity: No Food Insecurity    Worried About Running Out of Food in the Last Year: Never true    Ileana of Food in the Last Year: Never true   Transportation Needs:  Lack of Transportation (Medical): Not on file    Lack of Transportation (Non-Medical): Not on file   Physical Activity:     Days of Exercise per Week: Not on file    Minutes of Exercise per Session: Not on file   Stress:     Feeling of Stress : Not on file   Social Connections:     Frequency of Communication with Friends and Family: Not on file    Frequency of Social Gatherings with Friends and Family: Not on file    Attends Christian Services: Not on file    Active Member of [address] [place] [address] or Organizations: Not on file    Attends Club or Organization Meetings: Not on file    Marital Status: Not on file   Intimate Partner Violence:     Fear of Current or Ex-Partner: Not on file    Emotionally Abused: Not on file    Physically Abused: Not on file    Sexually Abused: Not on file   Housing Stability:     Unable to Pay for Housing in the Last Year: Not on file    Number of [place] in the Last Year: Not on file    Unstable Housing in the Last Year: Not on file     Family History   Problem Relation Age of Onset    No Known Problems Mother     Heart Disease Father     Heart Attack Father     High Blood Pressure Father     High Cholesterol Father     Diabetes Father     Arrhythmia Sister      REVIEW OF SYSTEMS:     Claus Oconnell denies fever/chills, chest pain, shortness of breath, new bowel or bladder complaints. All other review of systems was negative. PHYSICAL EXAMINATION:      /68   Pulse 60   Temp 97.5 °F (36.4 °C) (Infrared)   Ht 5' 10\" (1.778 m)   Wt 183 lb (83 kg)   SpO2 97%   BMI 26.26 kg/m²     General:       General appearance:   elderly, pleasant and well-hydrated.    , in mild discomfort and A & O x3  Build:Normal Weight     HEENT:     Head:normocephalic and atraumatic  Sclera: icterus absent,      Lungs:     Breathing:Breathing Pattern: regular, no distress     Abdomen:     Shape:non-distended and normal  Tenderness:none     Lumbar spine:     Spine inspection:normal   CVA tenderness:No   Palpation:tenderness paravertebral muscles, facet loading, left, right, positive and tenderness. right worse than Left  Range of motion:not tested.     Musculoskeletal:     Trigger points in Paravet[place]:absent bilaterally  SI joint tenderness:positive right, negative left  SLR:negative right, negative left, sitting      Extremities:     Tremors:None bilaterally upper and lower  Range of motion:pain with internal rotation of hips negative. Intact:Yes  Edema:Normal     Neurological:     Sensory:normal to light touch bilateral lower extremities. Motor:                             Right Quadriceps5-/5          Left Quadriceps5-/5           Right Gastrocnemius5-/5    Left Gastrocnemius5-/5  Right Ant Tibialis5-/5  Left Ant Tibialis5-/5  Gait:normal     Dermatology:     Skin:no unusual rashes and no skin lesions     Impression:  Low back pain with radiation to the right buttocks. Lumbar spine MRI 2019 L2-3/L3-4/L4-5 spinal canal stenosis worst at L3-4 with multilevel facet arthritis. Moderate relief with phyiscal therapy. Plan:  Follow up on his low back pain with no acute issues. Patient is s/p repeat bilateral L3,4,5 MBB with more than 80% improvement in his pain for more than 5 days which is consistent with first procedure. Discussed RFA including R/B/A, patient agrees to proceed. Currently with Gabapentin 300 mg BID. OARRS report reviewed 06/2022. Patient encouraged to stay active. Treatment plan discussed with the patient including medications and procedure side effects     We discussed with the patient that combining opioids, benzodiazepines, alcohol, illicit drugs or sleep aids increases the risk of respiratory depression including death. We discussed that these medications may cause drowsiness, sedation or dizziness and have counseled the patient not to drive or operate machinery. We have discussed that these medications will be prescribed only by one provider.  We have discussed with the

## 2022-06-27 ENCOUNTER — TELEPHONE (OUTPATIENT)
Dept: PAIN MANAGEMENT | Age: 67
End: 2022-06-27

## 2022-06-27 ENCOUNTER — PREP FOR PROCEDURE (OUTPATIENT)
Dept: PAIN MANAGEMENT | Age: 67
End: 2022-06-27

## 2022-06-27 ENCOUNTER — OFFICE VISIT (OUTPATIENT)
Dept: PRIMARY CARE CLINIC | Age: 67
End: 2022-06-27
Payer: MEDICARE

## 2022-06-27 VITALS
BODY MASS INDEX: 25.64 KG/M2 | HEART RATE: 52 BPM | SYSTOLIC BLOOD PRESSURE: 120 MMHG | DIASTOLIC BLOOD PRESSURE: 70 MMHG | HEIGHT: 70 IN | WEIGHT: 179.1 LBS | TEMPERATURE: 97.2 F | OXYGEN SATURATION: 99 %

## 2022-06-27 DIAGNOSIS — Z00.00 HEALTHCARE MAINTENANCE: ICD-10-CM

## 2022-06-27 DIAGNOSIS — S46.212S: ICD-10-CM

## 2022-06-27 DIAGNOSIS — I25.10 CAD IN NATIVE ARTERY: Primary | ICD-10-CM

## 2022-06-27 DIAGNOSIS — J43.9 PULMONARY EMPHYSEMA, UNSPECIFIED EMPHYSEMA TYPE (HCC): ICD-10-CM

## 2022-06-27 DIAGNOSIS — E78.2 MIXED HYPERLIPIDEMIA: ICD-10-CM

## 2022-06-27 DIAGNOSIS — M48.062 SPINAL STENOSIS OF LUMBAR REGION WITH NEUROGENIC CLAUDICATION: ICD-10-CM

## 2022-06-27 PROCEDURE — 1123F ACP DISCUSS/DSCN MKR DOCD: CPT | Performed by: INTERNAL MEDICINE

## 2022-06-27 PROCEDURE — 99214 OFFICE O/P EST MOD 30 MIN: CPT | Performed by: INTERNAL MEDICINE

## 2022-06-27 ASSESSMENT — PATIENT HEALTH QUESTIONNAIRE - PHQ9
SUM OF ALL RESPONSES TO PHQ QUESTIONS 1-9: 0
1. LITTLE INTEREST OR PLEASURE IN DOING THINGS: 0
SUM OF ALL RESPONSES TO PHQ QUESTIONS 1-9: 0
SUM OF ALL RESPONSES TO PHQ9 QUESTIONS 1 & 2: 0
2. FEELING DOWN, DEPRESSED OR HOPELESS: 0

## 2022-06-27 NOTE — PROGRESS NOTES
Chief Complaint   Patient presents with    Follow-up     from March 2022, with labs. did not have ct lung scan stating no one has called him for appt.  Other     about 2 weeks ago had a wood sliver removed from right eye at [facility].  Other     states he is still having muscle pains in left arm that has previous been discussed.  Other     care gap--- does not remember a colonoscopy being done, is due for hep C screen. is debating getting shingles vacccine. due for AAA screen       HPI:  Patient is here for follow-up . Lab work LDL cholesterol 62. Compliant on medications. Denies shortness of breath or chest pain he uses Symbicort regularly and Proventil inhaler rarely  Patient had an issue with the left upper arm saw  Dr. Sherri Lewis for orthopedic consult in November 2021  For it. He describes a biceps muscle pain worse by lifting or twisting his forearm. Patient does physical work. Patient is under the care of pain management for lumbar spinal stenosis received 2  epidural injections and scheduled for the third. States that helping  Past Medical History, Surgical History, and Family History has been reviewed and updated.     Review of Systems:  Constitutional:  No fever, no fatigue, no chills, no headaches, no weight change  Dermatology:  No rash, no mole, no dry or sensitive skin  ENT:  No cough, no sore throat, no sinus pain, no runny nose, no ear pain  Cardiology:  No chest pain, no palpitations, no leg edema, no shortness of breath, no PND  Gastroenterology:  No dysphagia, no abdominal pain, no nausea, no vomiting, no constipation, no diarrhea, no heartburn  Musculoskeletal:  No joint pain, no leg cramps, no back pain, no muscle aches  Respiratory:  No shortness of breath, no orthopnea, no wheezing, no OJEDA, no hemoptysis  Urology:  No blood in the urine, no urinary frequency, no urinary incontinence, no urinary urgency, no nocturia, no dysuria    Vitals:    06/27/22 1107   BP: 120/70   Site: Right Upper Arm   Position: Sitting   Cuff Size: Large Adult   Pulse: 52   Temp: 97.2 °F (36.2 °C)   SpO2: 99%   Weight: 179 lb 1.6 oz (81.2 kg)   Height: 5' 10\" (1.778 m)       General:  Patient alert and oriented x 3, NAD, pleasant  HEENT:  Atraumatic, normocephalic, PERRLA, EOMI, clear conjunctiva, TMs clear, nose-clear, throat - no erythema  Neck:  Supple, no goiter, no carotid bruits, no LAD  Lungs:  CTA   Heart:  RRR, no murmurs, gallops or rubs  Abdomen:  Soft/nt/nd, + bowel sounds  Extremities:  No clubbing, cyanosis or edema  Skin: unremarkable    Hemoglobin A1C   Date Value Ref Range Status   12/23/2020 5.4 4.0 - 5.6 % Final     Cholesterol, Total   Date Value Ref Range Status   06/22/2022 125 0 - 199 mg/dL Final     Triglycerides   Date Value Ref Range Status   06/22/2022 77 0 - 149 mg/dL Final     HDL   Date Value Ref Range Status   06/22/2022 46 >40 mg/dL Final     LDL Calculated   Date Value Ref Range Status   06/22/2022 64 0 - 99 mg/dL Final     VLDL Cholesterol Calculated   Date Value Ref Range Status   06/22/2022 15 mg/dL Final     Sodium   Date Value Ref Range Status   06/22/2022 142 132 - 146 mmol/L Final     Potassium   Date Value Ref Range Status   06/22/2022 3.6 3.5 - 5.0 mmol/L Final     Chloride   Date Value Ref Range Status   06/22/2022 105 98 - 107 mmol/L Final     CO2   Date Value Ref Range Status   06/22/2022 21 (L) 22 - 29 mmol/L Final     BUN   Date Value Ref Range Status   06/22/2022 12 6 - 23 mg/dL Final     CREATININE   Date Value Ref Range Status   06/22/2022 0.8 0.7 - 1.2 mg/dL Final     Glucose   Date Value Ref Range Status   06/22/2022 102 (H) 74 - 99 mg/dL Final     Calcium   Date Value Ref Range Status   06/22/2022 9.0 8.6 - 10.2 mg/dL Final     Total Protein   Date Value Ref Range Status   06/22/2022 7.1 6.4 - 8.3 g/dL Final     Albumin   Date Value Ref Range Status   06/22/2022 4.3 3.5 - 5.2 g/dL Final     Total Bilirubin   Date Value Ref Range Status   06/22/2022 0.7 0.0 - 1.2 mg/dL Final     Alkaline Phosphatase   Date Value Ref Range Status   06/22/2022 53 40 - 129 U/L Final     AST   Date Value Ref Range Status   06/22/2022 27 0 - 39 U/L Final     ALT   Date Value Ref Range Status   06/22/2022 30 0 - 40 U/L Final     GFR Non-   Date Value Ref Range Status   06/22/2022 >60 >=60 mL/min/1.73 Final     Comment:     Chronic Kidney Disease: less than 60 ml/min/1.73 sq.m. Kidney Failure: less than 15 ml/min/1.73 sq.m. Results valid for patients 18 years and older. GFR    Date Value Ref Range Status   06/22/2022 >60  Final        FL[facility]ORO FOR SURGICAL PROCEDURES    Result Date: 6/13/2022  Radiology exam is complete. No Radiologist dictation. Please follow up with ordering provider. Assessment/Plan:    Coronary artery disease  Hyperlipidemia controlled  COPD controlled  Lumbar spinal stenosis  Left biceps muscle strain    Outpatient Encounter Medications as of 6/27/2022   Medication Sig Dispense Refill    rosuvastatin (CRESTOR) 20 MG tablet Take 1 tablet by mouth daily 90 tablet 0    gabapentin (NEURONTIN) 300 MG capsule TAKE 1 CAPSULE BY MOUTH IN THE MORNING AND AT BEDTIME FOR 30 DAYS.  INTENDED SUPPLY: 90 DAYS 60 capsule 0    tamsulosin (FLOMAX) 0.4 MG capsule Take 0.4 mg by mouth daily      COMBIGAN 0.2-0.5 % ophthalmic solution Place 1 drop into both eyes 2 times daily      brinzolamide (AZOPT) 1 % ophthalmic suspension Place 1 drop into both eyes nightly      budesonide-formoterol (SYMBICORT) 160-4.5 MCG/ACT AERO Inhale 2 puffs into the lungs 2 times daily 3 each 3    albuterol sulfate  (90 Base) MCG/ACT inhaler Inhale 2 puffs into the lungs every 4 hours as needed for Wheezing or Shortness of Breath 3 each 3    albuterol (PROVENTIL) (2.5 MG/3ML) 0.083% nebulizer solution Take 3 mLs by nebulization every 4 hours as needed for Wheezing 360 each 3    aspirin 81 MG chewable tablet Take 1 tablet by mouth daily 90 tablet 3  latanoprost (XALATAN) 0.005 % ophthalmic solution instill 1 drop into both eyes at bedtime      Arginine 500 MG CAPS Take 2 capsules by mouth 3 times daily       Omega-3 Fatty Acids (FISH OIL) 1200 MG CAPS Take by mouth 2 times daily      Multiple Vitamins-Minerals (THERAPEUTIC MULTIVITAMIN-MINERALS) tablet Take 1 tablet by mouth 2 times daily      [DISCONTINUED] amLODIPine (NORVASC) 2.5 MG tablet Take 1 tablet by mouth daily (Patient not taking: Reported on 6/27/2022) 90 tablet 0    [DISCONTINUED] SPIRIVA HANDIHALER 18 MCG inhalation capsule inhale contents of 1 capsule by mouth once daily (Patient not taking: Reported on 6/27/2022) 90 capsule 3     No facility-administered encounter medications on file as of 6/27/2022. Luanne Das was seen today for follow-up, other, other and other. Diagnoses and all orders for this visit:    CAD in native artery    Healthcare maintenance  -     Hepatitis C Antibody; Future    Mixed hyperlipidemia    Pulmonary emphysema, unspecified emphysema type ([address]    Spinal stenosis of lumbar region with neurogenic claudication    Biceps muscle strain, left, sequela  -     Teena Olmstead 15, DO, Orthopaedics and Sports Medicine, [place]         There are no Patient Instructions on file for this visit.            Julia Watson MD   6/27/22

## 2022-06-27 NOTE — TELEPHONE ENCOUNTER
6/27/2022-upon review of Wilber's chart, it is noted that he takes ASA . Medical clearance obtained from Dr. Jovani Encarnacion OK to hold ASA  for 7 days. Call to Laura Hernández, advised him to hold his ASA  for 7 days before his 07/11/2022 procedure, last dose to be 07/03/2022. Also advised to hold fish oil 7 days he shows an understanding to not take them before his procedure. Instructed him that the surgery center should call him a few days before for the pre op call and after 3:00 PM the business day before with the arrival time. Laura Hernández verbalized understanding.         Olegario Hair RN  Pain Management

## 2022-07-11 ENCOUNTER — HOSPITAL ENCOUNTER (OUTPATIENT)
Age: 67
Setting detail: OUTPATIENT SURGERY
Discharge: HOME | End: 2022-07-11
Attending: PAIN MEDICINE | Admitting: PAIN MEDICINE
Payer: MEDICARE

## 2022-07-11 ENCOUNTER — HOSPITAL ENCOUNTER (OUTPATIENT)
Dept: OPERATING ROOM | Age: 67
Setting detail: OUTPATIENT SURGERY
Discharge: HOME | End: 2022-07-11
Attending: PAIN MEDICINE
Payer: MEDICARE

## 2022-07-11 VITALS
DIASTOLIC BLOOD PRESSURE: 53 MMHG | SYSTOLIC BLOOD PRESSURE: 129 MMHG | HEART RATE: 60 BPM | WEIGHT: 179 LBS | HEIGHT: 70 IN | BODY MASS INDEX: 25.62 KG/M2 | OXYGEN SATURATION: 98 % | RESPIRATION RATE: 14 BRPM

## 2022-07-11 DIAGNOSIS — M47.896 OTHER OSTEOARTHRITIS OF SPINE, LUMBAR REGION: ICD-10-CM

## 2022-07-11 PROCEDURE — 64636 DESTROY L/S FACET JNT ADDL: CPT | Performed by: PAIN MEDICINE

## 2022-07-11 PROCEDURE — 6360000002 HC RX W HCPCS: Performed by: PAIN MEDICINE

## 2022-07-11 PROCEDURE — 64635 DESTROY LUMB/SAC FACET JNT: CPT | Performed by: PAIN MEDICINE

## 2022-07-11 PROCEDURE — 7100000011 HC PHASE II RECOVERY - ADDTL 15 MIN: Performed by: PAIN MEDICINE

## 2022-07-11 PROCEDURE — 2709999900 HC NON-CHARGEABLE SUPPLY: Performed by: PAIN MEDICINE

## 2022-07-11 PROCEDURE — 3209999900 FLUORO FOR SURGICAL PROCEDURES

## 2022-07-11 PROCEDURE — 2500000003 HC RX 250 WO HCPCS: Performed by: PAIN MEDICINE

## 2022-07-11 PROCEDURE — 3600000015 HC SURGERY LEVEL 5 ADDTL 15MIN: Performed by: PAIN MEDICINE

## 2022-07-11 PROCEDURE — 7100000010 HC PHASE II RECOVERY - FIRST 15 MIN: Performed by: PAIN MEDICINE

## 2022-07-11 PROCEDURE — 3600000005 HC SURGERY LEVEL 5 BASE: Performed by: PAIN MEDICINE

## 2022-07-11 PROCEDURE — C1713 ANCHOR/SCREW BN/BN,TIS/BN: HCPCS | Performed by: PAIN MEDICINE

## 2022-07-11 RX ORDER — SODIUM CHLORIDE 0.9 % (FLUSH) 0.9 %
5-40 SYRINGE (ML) INJECTION PRN
Status: DISCONTINUED | OUTPATIENT
Start: 2022-07-11 | End: 2022-07-11 | Stop reason: HOSPADM

## 2022-07-11 RX ORDER — LIDOCAINE HYDROCHLORIDE 20 MG/ML
INJECTION, SOLUTION EPIDURAL; INFILTRATION; INTRACAUDAL; PERINEURAL PRN
Status: DISCONTINUED | OUTPATIENT
Start: 2022-07-11 | End: 2022-07-11 | Stop reason: ALTCHOICE

## 2022-07-11 RX ORDER — SODIUM CHLORIDE 9 MG/ML
INJECTION, SOLUTION INTRAVENOUS PRN
Status: DISCONTINUED | OUTPATIENT
Start: 2022-07-11 | End: 2022-07-11 | Stop reason: HOSPADM

## 2022-07-11 RX ORDER — SODIUM CHLORIDE 0.9 % (FLUSH) 0.9 %
5-40 SYRINGE (ML) INJECTION EVERY 12 HOURS SCHEDULED
Status: DISCONTINUED | OUTPATIENT
Start: 2022-07-11 | End: 2022-07-11 | Stop reason: HOSPADM

## 2022-07-11 ASSESSMENT — PAIN - FUNCTIONAL ASSESSMENT: PAIN_FUNCTIONAL_ASSESSMENT: NONE - DENIES PAIN

## 2022-07-11 NOTE — H&P
[address]  [address] [facility], [address] [place] [address] Torsten  [phone]    Procedure History & Physical      [facility]     HPI:    Patient  is here for axial low back pain for bilateral L3,4,5 MB RFA    Labs/imaging studies reviewed   All question and concerns addressed including R/B/A associated with the procedure    Past Medical History:   Diagnosis Date    Arthritis     Asthma     Breathing difficulty     CAD (coronary artery disease)     COPD (chronic obstructive pulmonary disease) ([address]     Non-rheumatic tricuspid valve insufficiency     Nonrheumatic mitral (valve) insufficiency        Past Surgical History:   Procedure Laterality Date    APPENDECTOMY      COLONOSCOPY      CORONARY ANGIOPLASTY WITH STENT PLACEMENT  12/22/2020    DR. Mandel Resolute [place] DUANE 2.25x18 Mid LAD, Resolute [place] DUANE 2.5x18 Mid Cx. EF 65%    HERNIA REPAIR      INTRACAPSULAR CATARACT EXTRACTION Left 4/6/2021    LEFT   CATARACT EXTRACTION  IOL IMPLANT I STENT performed by Jacob Greenwood MD at [facility] Bilateral 5/2/2022    BILATERAL L2 L3 L4 MEDIAL BRACH NERVE BLOCK (CPT 21947) performed by Tucker Macias MD at [facility] Bilateral 6/13/2022    BILATERAL L3 L4 L5 MEDIAL BRANCH NERVE BLOCK performed by Tucker Macias MD at [place] [address]      right wrist carpal tunnel release, right long trigger finger release    OTHER SURGICAL HISTORY Right 01/20/2016    right shoulder arthroscopy acromioplasty with decompression rotator cuff repair     OTHER SURGICAL HISTORY Left 04/06/2021    LEFT EYE CATERACT EXTRACTION WITH INTRA OCULAR IMPLANT WITH I STENT    SHOULDER ARTHROSCOPY  3/4/11    left rotator cuff repair       Prior to Admission medications    Medication Sig Start Date End Date Taking?  Authorizing Provider   rosuvastatin (CRESTOR) 20 MG tablet Take 1 tablet by mouth daily 6/21/22   Ermelinda Roman MD   gabapentin (NEURONTIN) 300 MG capsule TAKE 1 CAPSULE BY MOUTH IN THE MORNING AND AT BEDTIME FOR 30 DAYS.  INTENDED SUPPLY: 90 DAYS 6/3/22 7/3/22  ARSEN Cotter   tamsulosin [facility]) 0.4 MG capsule Take 0.4 mg by mouth daily 4/7/22   Historical Provider, MD   COMBIGAN 0.2-0.5 % ophthalmic solution Place 1 drop into both eyes 2 times daily 4/12/22   Historical Provider, MD   brinzolamide (AZOPT) 1 % ophthalmic suspension Place 1 drop into both eyes nightly 4/22/22   Historical Provider, MD   budesonide-formoterol (SYMBICORT) 160-4.5 MCG/ACT AERO Inhale 2 puffs into the lungs 2 times daily 12/30/21   Hoa Alva DO   albuterol sulfate  (90 Base) MCG/ACT inhaler Inhale 2 puffs into the lungs every 4 hours as needed for Wheezing or Shortness of Breath 12/30/21   Farnaz Alva DO   albuterol (PROVENTIL) (2.5 MG/3ML) 0.083% nebulizer solution Take 3 mLs by nebulization every 4 hours as needed for Wheezing 12/30/21   Farnaz Alva DO   aspirin 81 MG chewable tablet Take 1 tablet by mouth daily 4/23/21   Mauricio Lee MD   latanoprost (XALATAN) 0.005 % ophthalmic solution instill 1 drop into both eyes at bedtime 4/15/21   Historical Provider, MD   Arginine 500 MG CAPS Take 2 capsules by mouth 3 times daily     Historical Provider, MD   Omega-3 Fatty Acids (FISH OIL) 1200 MG CAPS Take by mouth 2 times daily    Historical Provider, MD   Multiple Vitamins-Minerals (THERAPEUTIC MULTIVITAMIN-MINERALS) tablet Take 1 tablet by mouth 2 times daily    Historical Provider, MD       No Known Allergies    Social History     Socioeconomic History    Marital status:      Spouse name: Not on file    Number of children: Not on file    Years of education: Not on file    Highest education level: Not on file   Occupational History    Not on file   Tobacco Use    Smoking status: Former Smoker     Packs/day: 1.50     Years: 40.00     Pack years: 60.00     Types: Cigarettes     Start date: 1969     Quit date: 2016 Years since quittin.5    Smokeless tobacco: Never Used   Vaping Use    Vaping Use: Never used   Substance and Sexual Activity    Alcohol use: No     Comment: 6 cups a day of coffee    Drug use: No    Sexual activity: Not on file   Other Topics Concern    Not on file   Social History Narrative    Not on file     Social Determinants of Health     Financial Resource Strain: Medium Risk    Difficulty of Paying Living Expenses: Somewhat hard   Food Insecurity: No Food Insecurity    Worried About Running Out of Food in the Last Year: Never true    Ileana of Food in the Last Year: Never true   Transportation Needs:     Lack of Transportation (Medical): Not on file    Lack of Transportation (Non-Medical):  Not on file   Physical Activity:     Days of Exercise per Week: Not on file    Minutes of Exercise per Session: Not on file   Stress:     Feeling of Stress : Not on file   Social Connections:     Frequency of Communication with Friends and Family: Not on file    Frequency of Social Gatherings with Friends and Family: Not on file    Attends Quaker Services: Not on file    Active Member of [address] [place] [address] or Organizations: Not on file    Attends Club or Organization Meetings: Not on file    Marital Status: Not on file   Intimate Partner Violence:     Fear of Current or Ex-Partner: Not on file    Emotionally Abused: Not on file    Physically Abused: Not on file    Sexually Abused: Not on file   Housing Stability:     Unable to Pay for Housing in the Last Year: Not on file    Number of [place] in the Last Year: Not on file    Unstable Housing in the Last Year: Not on file       Family History   Problem Relation Age of Onset    No Known Problems Mother     Heart Disease Father     Heart Attack Father     High Blood Pressure Father     High Cholesterol Father     Diabetes Father     Arrhythmia Sister          REVIEW OF SYSTEMS:    CONSTITUTIONAL:  negative for  fevers, chills, sweats and fatigue    RESPIRATORY:  negative for  dry cough, cough with sputum, dyspnea, wheezing and chest pain    CARDIOVASCULAR:  negative for chest pain, dyspnea, palpitations, syncope    GASTROINTESTINAL:  negative for nausea, vomiting, change in bowel habits, diarrhea, constipation and abdominal pain    MUSCULOSKELETAL: negative for muscle weakness    SKIN: negative for itching or rashes. BEHAVIOR/PSYCH:  negative for poor appetite, increased appetite, decreased sleep and poor concentration    All other systems negative      PHYSICAL EXAM:    VITALS:  /66   Pulse 56   Resp 18   Ht 5' 10\" (1.778 m)   Wt 179 lb (81.2 kg)   SpO2 97%   BMI 25.68 kg/m²     CONSTITUTIONAL:  awake, alert, cooperative, no apparent distress, and appears stated age    EYES: PERRLA, EOMI    LUNGS:  No increased work of breathing, no audible wheezing    CARDIOVASCULAR:  regular rate and rhythm    ABDOMEN:  Soft non tender non distended     EXTREMITIES: no signs of clubbing or cyanosis. MUSCULOSKELETAL: negative for flaccid muscle tone or spastic movements. SKIN: gross examination reveals no signs of rashes, or diaphoresis. NEURO: Cranial nerves II-XII grossly intact. No signs of agitated mood. Assessment/Plan:    Axial low back pain for bilateral L3.4,5 MB RFA.

## 2022-07-11 NOTE — OP NOTE
2022    Patient: Tanja Baker  :  1955  Age:  77 y.o. Sex:  male     PRE-OPERATIVE DIAGNOSIS: Bilateral Lumbar spondylosis, lumbar facet arthropathy. POST-OPERATIVE DIAGNOSIS: Same. PROCEDURE:  Fluoroscopic-guided Bilateral  Lumbar facet medial branch radiofrequency ablation at levels L3,4,5 MB RFA. SURGEON:  MARIELLA Espinoza M.D. ANESTHESIA: Local    ESTIMATED BLOOD LOSS: None.  ______________________________________________________________________  HISTORY & PHYSICAL: Tanja Baker presents today for fluoroscopic-guided Bilateral lumbar facet medial branch radiofrequency ablation at levels L3,4,5 MB . The potential complications of the procedure were explained to Carissa again today and he has elected to undergo the aforementioned procedure. Cristian complete History & Physical examination were reviewed in depth, a copy of which is in the chart. DESCRIPTION OF PROCEDURE:    After confirming written and informed consent, a time-out was performed and Ernestine Ram name and date of birth, the procedure to be performed as well as the plan for the location of the needle insertion were confirmed. The patient was brought into the procedure room and placed in the prone position on the fluoroscopy table. Standard monitors were placed and vital signs were observed throughout the procedure. The area of the lumbar spine and upper buttocks was sterilely prepped with chloraprep and draped in a sterile manner. AP fluoroscopy was used to identify and reggie bartons point at the targeted area. A 22 gauge 10 mm radiofrequency probe was advanced toward each of these points. Once bone was contacted, negative aspiration for blood and CSF was confirmed, sensory stimulation was performed at 50 Hz and at 0.4 volts generating a pressure sensation. Motor stimulation < 2.0 volts elicited multifidus twitching without any radicular symptoms.  1 ml of 2% lidocaine was injected prior to lesioning, which was performed for 90 seconds at 90 degrees centigrade. Once the lesions were complete, a solution of 0.25% marcaine 3 cc and 40 mg DepoMedrol was injected and distributed equally through each probe. The probes were removed . The patient's back was cleaned and bandages were placed over the needle insertion sites. Disposition the patient tolerated the procedure well and there were no complications . Vital signs remained stable throughout the procedure. The patient was escorted to the recovery area where they remained until discharge and written discharge instructions for the procedure were given. Plan: Loraine Mcgrath will return to our pain management center as scheduled.      Jaci Yoo MD

## 2022-07-12 ENCOUNTER — OFFICE VISIT (OUTPATIENT)
Dept: PRIMARY CARE CLINIC | Age: 67
End: 2022-07-12
Payer: MEDICARE

## 2022-07-12 VITALS
BODY MASS INDEX: 25.25 KG/M2 | HEIGHT: 70 IN | OXYGEN SATURATION: 98 % | DIASTOLIC BLOOD PRESSURE: 60 MMHG | HEART RATE: 54 BPM | SYSTOLIC BLOOD PRESSURE: 120 MMHG | WEIGHT: 176.4 LBS | TEMPERATURE: 97.4 F

## 2022-07-12 DIAGNOSIS — Z00.00 INITIAL MEDICARE ANNUAL WELLNESS VISIT: Primary | ICD-10-CM

## 2022-07-12 DIAGNOSIS — H90.3 SENSORINEURAL HEARING LOSS (SNHL) OF BOTH EARS: ICD-10-CM

## 2022-07-12 DIAGNOSIS — Z00.00 HEALTHCARE MAINTENANCE: ICD-10-CM

## 2022-07-12 PROCEDURE — 1123F ACP DISCUSS/DSCN MKR DOCD: CPT | Performed by: INTERNAL MEDICINE

## 2022-07-12 PROCEDURE — G0438 PPPS, INITIAL VISIT: HCPCS | Performed by: INTERNAL MEDICINE

## 2022-07-12 ASSESSMENT — PATIENT HEALTH QUESTIONNAIRE - PHQ9
2. FEELING DOWN, DEPRESSED OR HOPELESS: 0
SUM OF ALL RESPONSES TO PHQ QUESTIONS 1-9: 0
SUM OF ALL RESPONSES TO PHQ QUESTIONS 1-9: 0
1. LITTLE INTEREST OR PLEASURE IN DOING THINGS: 0
SUM OF ALL RESPONSES TO PHQ QUESTIONS 1-9: 0
SUM OF ALL RESPONSES TO PHQ QUESTIONS 1-9: 0
SUM OF ALL RESPONSES TO PHQ9 QUESTIONS 1 & 2: 0

## 2022-07-12 ASSESSMENT — LIFESTYLE VARIABLES: HOW OFTEN DO YOU HAVE A DRINK CONTAINING ALCOHOL: NEVER

## 2022-07-12 NOTE — PROGRESS NOTES
Medicare Annual Wellness Visit    Kenzie Garcias is here for Medicare AWV (does not have a living will and does not have decision makers listed)    Assessment & Plan   Initial Medicare annual wellness visit  Sensorineural hearing loss (SNHL) of both ears  -     ZOEY Armijo, Audiology, [place]      Recommendations for Preventive Services Due: see orders and patient instructions/AVS.  Recommended screening schedule for the next 5-10 years is provided to the patient in written form: see Patient Instructions/AVS.     Return for Medicare Annual Wellness Visit in 1 year. Subjective     Patient's complete Health Risk Assessment and screening values have been reviewed and are found in Flowsheets. The following problems were reviewed today and where indicated follow up appointments were made and/or referrals ordered.     Positive Risk Factor Screenings with Interventions:    Fall Risk:  Do you feel unsteady or are you worried about falling? : (!) yes  2 or more falls in past year?: (!) yes  Fall with injury in past year?: no     Fall Risk Interventions:    · Home exercises provided to promote strength and balance              General Health and ACP:  General  In general, how would you say your health is?: Very Good  In the past 7 days, have you experienced any of the following: New or Increased Pain, New or Increased Fatigue, Loneliness, Social Isolation, Stress or Anger?: No  Do you get the social and emotional support that you need?: Yes  Do you have a Living Will?: (!) No    Advance Directives     Power of  Living Will ACP-Advance Directive ACP-Power of     Not on File Not on File Not on File Not on File      General Health Risk Interventions:  · No Living Will: [address] addressed with patient today    Health Habits/Nutrition:     Physical Activity: Sufficiently Active    Days of Exercise per Week: 2 days    Minutes of Exercise per Session: 120 min     Have you lost any weight without trying in the past 3 months?: No  Body mass index: (!) 25.31  Have you seen the dentist within the past year?: (!) No    Health Habits/Nutrition Interventions:  · Inadequate physical activity:  patient agrees to exercise for at least 150 minutes/week    Hearing/Vision:  Do you or your family notice any trouble with your hearing that hasn't been managed with hearing aids?: (!) Yes  Do you have difficulty driving, watching TV, or doing any of your daily activities because of your eyesight?: (!) Yes  Have you had an eye exam within the past year?: Yes  No exam data present    Hearing/Vision Interventions:  · Hearing concerns:  audiology referral provided    Safety:  Do you have working smoke detectors?: Yes  Do you have any tripping hazards - loose or unsecured carpets or rugs?: No  Do you have any tripping hazards - clutter in doorways, halls, or stairs?: No  Do you have either shower bars, grab bars, non-slip mats or non-slip surfaces in your shower or bathtub?: Yes  Do all of your stairways have a railing or banister?: Yes  Do you always fasten your seatbelt when you are in a car?: (!) No    Safety Interventions:  · Home safety tips provided           Objective   Vitals:    07/12/22 1032   BP: 120/60   Site: Left Upper Arm   Position: Sitting   Cuff Size: Medium Adult   Pulse: 54   Temp: 97.4 °F (36.3 °C)   SpO2: 98%   Weight: 176 lb 6.4 oz (80 kg)   Height: 5' 10\" (1.778 m)      Body mass index is 25.31 kg/m². No Known Allergies  Prior to Visit Medications    Medication Sig Taking? Authorizing Provider   rosuvastatin (CRESTOR) 20 MG tablet Take 1 tablet by mouth daily Yes Ermelinda Armstrong MD   gabapentin (NEURONTIN) 300 MG capsule TAKE 1 CAPSULE BY MOUTH IN THE MORNING AND AT BEDTIME FOR 30 DAYS.  INTENDED SUPPLY: 90 DAYS Yes ARSEN Rosa   tamsulosin (FLOMAX) 0.4 MG capsule Take 0.4 mg by mouth daily Yes Historical Provider, MD   COMBIGAN 0.2-0.5 % ophthalmic solution Place 1 drop into both eyes 2 times daily Yes Historical Provider, MD   brinzolamide (AZOPT) 1 % ophthalmic suspension Place 1 drop into both eyes nightly Yes Historical Provider, MD   budesonide-formoterol (SYMBICORT) 160-4.5 MCG/ACT AERO Inhale 2 puffs into the lungs 2 times daily Yes Hoa Alva, DO   albuterol sulfate  (90 Base) MCG/ACT inhaler Inhale 2 puffs into the lungs every 4 hours as needed for Wheezing or Shortness of Breath Yes Hoa Alva DO   albuterol (PROVENTIL) (2.5 MG/3ML) 0.083% nebulizer solution Take 3 mLs by nebulization every 4 hours as needed for Wheezing Yes Hoa Alva DO   aspirin 81 MG chewable tablet Take 1 tablet by mouth daily Yes German Landon MD   latanoprost (XALATAN) 0.005 % ophthalmic solution instill 1 drop into both eyes at bedtime Yes Historical Provider, MD   Arginine 500 MG CAPS Take 2 capsules by mouth 3 times daily  Yes Historical Provider, MD   Omega-3 Fatty Acids (FISH OIL) 1200 MG CAPS Take by mouth 2 times daily Yes Historical Provider, MD   Multiple Vitamins-Minerals (THERAPEUTIC MULTIVITAMIN-MINERALS) tablet Take 1 tablet by mouth 2 times daily Yes Historical Provider, MD Gayle (Including outside providers/suppliers regularly involved in providing care):   Patient Care Team:  Lawson Pallas, MD as PCP - Abram Morelos MD as PCP - [facility] Empaneled Provider  Cortney Mccurdy as Imaging Navigator  German Landon MD as Consulting Physician (Cardiology)     Reviewed and updated this visit:  Tobacco  Allergies  Meds  Problems  Med Hx  Surg Hx  Soc Hx  Fam Hx

## 2022-07-12 NOTE — PATIENT INSTRUCTIONS
Personalized Preventive Plan for Kenzie Garcias - 7/12/2022  Medicare offers a range of preventive health benefits. Some of the tests and screenings are paid in full while other may be subject to a deductible, co-insurance, and/or copay. Some of these benefits include a comprehensive review of your medical history including lifestyle, illnesses that may run in your family, and various assessments and screenings as appropriate. After reviewing your medical record and screening and assessments performed today your provider may have ordered immunizations, labs, imaging, and/or referrals for you. A list of these orders (if applicable) as well as your Preventive Care list are included within your After Visit Summary for your review. Other Preventive Recommendations:    · A preventive eye exam performed by an eye specialist is recommended every 1-2 years to screen for glaucoma; cataracts, macular degeneration, and other eye disorders. · A preventive dental visit is recommended every 6 months. · Try to get at least 150 minutes of exercise per week or 10,000 steps per day on a pedometer . · Order or download the FREE \"Exercise & Physical Activity: Your Everyday Guide\" from [employer]. Call 6-710.462.9606 or search [employer] online. · You need 0437-6606 mg of calcium and 3415-1606 IU of vitamin D per day. It is possible to meet your calcium requirement with diet alone, but a vitamin D supplement is usually necessary to meet this goal.  · When exposed to the sun, use a sunscreen that protects against both UVA and UVB radiation with an SPF of 30 or greater. Reapply every 2 to 3 hours or after sweating, drying off with a towel, or swimming. · Always wear a seat belt when traveling in a car. Always wear a helmet when riding a bicycle or motorcycle.

## 2022-07-13 DIAGNOSIS — M19.029 ELBOW ARTHRITIS: Primary | ICD-10-CM

## 2022-07-13 LAB — HEPATITIS C ANTIBODY INTERPRETATION: NORMAL

## 2022-07-14 ENCOUNTER — TELEPHONE (OUTPATIENT)
Dept: AUDIOLOGY | Age: 67
End: 2022-07-14

## 2022-07-14 NOTE — TELEPHONE ENCOUNTER
Attempted to schedule audiology testing. Patient message: The wireless customer you are calling is not available. Try again later.

## 2022-07-18 ENCOUNTER — OFFICE VISIT (OUTPATIENT)
Dept: ORTHOPEDIC SURGERY | Age: 67
End: 2022-07-18
Payer: MEDICARE

## 2022-07-18 VITALS — WEIGHT: 176 LBS | TEMPERATURE: 98 F | HEIGHT: 70 IN | BODY MASS INDEX: 25.2 KG/M2

## 2022-07-18 DIAGNOSIS — M25.522 LEFT ELBOW PAIN: ICD-10-CM

## 2022-07-18 DIAGNOSIS — M19.029 ELBOW ARTHRITIS: Primary | ICD-10-CM

## 2022-07-18 PROCEDURE — 1123F ACP DISCUSS/DSCN MKR DOCD: CPT | Performed by: ORTHOPAEDIC SURGERY

## 2022-07-18 PROCEDURE — 99213 OFFICE O/P EST LOW 20 MIN: CPT | Performed by: ORTHOPAEDIC SURGERY

## 2022-07-18 PROCEDURE — 20605 DRAIN/INJ JOINT/BURSA W/O US: CPT | Performed by: ORTHOPAEDIC SURGERY

## 2022-07-18 NOTE — PROGRESS NOTES
Chief Complaint   Patient presents with    Elbow Injury     Left elbow bicep pain follow up. Pain in joint has improved since last visit. Still painful around the belly of the bicep. Valerie Forrest  male is here for a follow up of his left elbow and bicep pain. Patient states the CSI done at the last visit for his elbow helped but the bicep pain has continued. Past Medical History:   Diagnosis Date    Arthritis     Asthma     Breathing difficulty     CAD (coronary artery disease)     COPD (chronic obstructive pulmonary disease) (HCC)     Non-rheumatic tricuspid valve insufficiency     Nonrheumatic mitral (valve) insufficiency      Past Surgical History:   Procedure Laterality Date    APPENDECTOMY      COLONOSCOPY      CORONARY ANGIOPLASTY WITH STENT PLACEMENT  12/22/2020    DR. Mandel Resolute [place] DUANE 2.25x18 Mid LAD, Resolute [place] DUANE 2.5x18 Mid Cx.   EF 65%    HERNIA REPAIR      INTRACAPSULAR CATARACT EXTRACTION Left 4/6/2021    LEFT   CATARACT EXTRACTION  IOL IMPLANT I STENT performed by Delores Ferguson MD at [address] Bilateral 5/2/2022    BILATERAL L2 L3 L4 MEDIAL BRACH NERVE BLOCK (CPT 89660) performed by Breana Pinedo MD at [address]  Bilateral 6/13/2022    BILATERAL L3 L4 L5 MEDIAL BRANCH NERVE BLOCK performed by Breana Pinedo MD at [address]      right wrist carpal tunnel release, right long trigger finger release    OTHER SURGICAL HISTORY Right 01/20/2016    right shoulder arthroscopy acromioplasty with decompression rotator cuff repair     OTHER SURGICAL HISTORY Left 04/06/2021    LEFT EYE CATERACT EXTRACTION WITH INTRA OCULAR IMPLANT WITH I STENT    PAIN MANAGEMENT PROCEDURE Bilateral 7/11/2022    BILATERAL L3, 4, 5 MEDIAL BRANCH RADIOFREQUENCY ABLATION (CPT [id number]) performed by Breana Pinedo MD at [address] [facility] ARTHROSCOPY  3/4/11    left rotator cuff repair       Current Outpatient Medications: Subjective:      Constitution:    Temp 98 °F (36.7 °C)   Ht 5' 10\" (1.778 m)   Wt 176 lb (79.8 kg)   BMI 25.25 kg/m²     Psycihatric:    The patient is alert and oriented x 3, appears to be stated age and in no distress. Respiratory:    Respiratory effort is not labored. Patient is not gasping. Palpation of the chest reveals no tactile fremitus. Skin:    Upon inspection: the skin appears warm, dry and intact. There is not a previous scar over the affected area. There is not any cellulitis, lymphedema or cutaneous lesions noted in the lower extremities. Upon palpation there is no induration noted. Neurologic:      Motor exam of the upper extremities show: The reflexes in biceps/triceps/brachioradialis are equal and symmetric. Sensory exam C5-T1 are normal bilaterally. Cardiovascular: The vascular exam is normal and is well perfused to distal extremities. There are 2+ radial pulses bilaterally, and motor and sensation is intact to median, ulnar, and radial, musclocutaneus, and axillary nerve distribution and grossly symmetric bilaterally. There is cap refill noted less than two seconds in all digits. There is not edema of the bilateral upper extremities. There is not varicosities noted in the distal extremities. Lymph:    Upon palpation,  there is no lymphadenopathy noted in bilateral upper extremities. Musculoskeletal:    Gait: normal; examination of the nails and digits reveal no cyanosis or clubbing. Cervical Exam:    On physical exam, Alexandr Justin is well-developed, well-nourished, oriented to person, place and time. his gait is normal.  On evaluation of his cervical spine, he has full range of motion of the cervical spine without pain. There is no cervical tenderness to palpation. Shoulder Exam:     On evaluation of his bilaterally upper extremities, his bilateral shoulder has no deformity. There is not evidence of scapular dyskinesis.   There is not muscle atrophy in shoulder girdle. The range of motion for the Right Shoulder is 15/050/t8 and for the Left shoulder is 150/50/t8. Right shoulder Motor strength is 5/5 in the supraspinatus, 5/5 internal rotation and 5/5 in external rotation, and Left shoulder motor strength 5/5 in supraspinatus, 5/5 in internal rotation, 5/5 in external rotation. Right shoulder:  (-) Impingement , (-) Porras , (-) Speeds, (-) Apprehension ,(-) Horton Load Shift, (-) [employer], (-) Cross arm test.     Left shoulder:  (-) Impingement, (-) Porras, (-) Speeds, (-) Apprehension,(-) Horton, (-) Load Shift, (-) [employer],(-)  Cross arm test        Left  elbow: pain is located globally. Range of motion: R.Elbow flexion 140/extension 0; L. Elbow flexion 130/extension 5. ; Wrist ROM R wrist DF 90, VF 90, L wrist DF 90, VF 90, R pronation 90/ supination 90, L pronation 90/supination 90. There is swelling, there is not ecchymosis. Exam is compared bilaterally. Right:  Special tests: Cozen's sign negative. Reverse cozen sign negative    Left:  Special tests: Cozen's sign negative. Reverse cozen sign negative    Xray:  Joint narrowing    MRI:  n/a    Radiographic findings reviewed with patient    Assessment:   Encounter Diagnoses   Name Primary? Elbow arthritis Yes    Left elbow pain          Plan:    Natural history and expected course discussed. Questions answered. Rest, ice, compression, and elevation (RICE) therapy. Reduction in offending activity. Tennis elbow splint. HEP    Verbal and written consent for the injection was given by the patient. He was placed in a supine position and the left elbow joint was cleaned in prepped with alcohol and betadine. He was injected with . 5ml of lidocaine and .5ml of Celestone 6mg into the elbow. The patient tolerated the injection well.   Follow up prn

## 2022-07-20 RX ORDER — BETAMETHASONE SODIUM PHOSPHATE AND BETAMETHASONE ACETATE 3; 3 MG/ML; MG/ML
3 INJECTION, SUSPENSION INTRA-ARTICULAR; INTRALESIONAL; INTRAMUSCULAR; SOFT TISSUE ONCE
Status: COMPLETED | OUTPATIENT
Start: 2022-07-20 | End: 2022-07-20

## 2022-07-20 RX ADMIN — BETAMETHASONE SODIUM PHOSPHATE AND BETAMETHASONE ACETATE 3 MG: 3; 3 INJECTION, SUSPENSION INTRA-ARTICULAR; INTRALESIONAL; INTRAMUSCULAR; SOFT TISSUE at 08:23

## 2022-09-02 ENCOUNTER — TELEPHONE (OUTPATIENT)
Dept: PULMONOLOGY | Age: 67
End: 2022-09-02

## 2022-09-02 NOTE — TELEPHONE ENCOUNTER
Mailed a letter to patient informing him that his CT Chest is scheduled for 10-13-22 at 10:00 am at the [facility]. He must arrive by 9:30 am. There is no prep for this test

## 2022-09-27 ENCOUNTER — PROCEDURE VISIT (OUTPATIENT)
Dept: AUDIOLOGY | Age: 67
End: 2022-09-27
Payer: MEDICARE

## 2022-09-27 ENCOUNTER — OFFICE VISIT (OUTPATIENT)
Dept: ORTHOPEDIC SURGERY | Age: 67
End: 2022-09-27
Payer: MEDICARE

## 2022-09-27 VITALS — BODY MASS INDEX: 25.2 KG/M2 | HEIGHT: 70 IN | TEMPERATURE: 98 F | WEIGHT: 176 LBS

## 2022-09-27 DIAGNOSIS — H93.13 TINNITUS OF BOTH EARS: ICD-10-CM

## 2022-09-27 DIAGNOSIS — M17.11 PRIMARY OSTEOARTHRITIS OF RIGHT KNEE: Primary | ICD-10-CM

## 2022-09-27 DIAGNOSIS — H91.8X9 ASYMMETRICAL HEARING LOSS: Primary | ICD-10-CM

## 2022-09-27 PROCEDURE — 99213 OFFICE O/P EST LOW 20 MIN: CPT | Performed by: ORTHOPAEDIC SURGERY

## 2022-09-27 PROCEDURE — 1123F ACP DISCUSS/DSCN MKR DOCD: CPT | Performed by: ORTHOPAEDIC SURGERY

## 2022-09-27 PROCEDURE — 20610 DRAIN/INJ JOINT/BURSA W/O US: CPT | Performed by: ORTHOPAEDIC SURGERY

## 2022-09-27 PROCEDURE — 92567 TYMPANOMETRY: CPT | Performed by: AUDIOLOGIST

## 2022-09-27 PROCEDURE — 92557 COMPREHENSIVE HEARING TEST: CPT | Performed by: AUDIOLOGIST

## 2022-09-27 RX ORDER — TRIAMCINOLONE ACETONIDE 40 MG/ML
40 INJECTION, SUSPENSION INTRA-ARTICULAR; INTRAMUSCULAR ONCE
Status: COMPLETED | OUTPATIENT
Start: 2022-09-27 | End: 2022-09-27

## 2022-09-27 RX ADMIN — TRIAMCINOLONE ACETONIDE 40 MG: 40 INJECTION, SUSPENSION INTRA-ARTICULAR; INTRAMUSCULAR at 12:04

## 2022-09-27 NOTE — PROGRESS NOTES
Chief Complaint   Patient presents with    Knee Pain     Right knee pain was having trouble bending the knee was swollen       Rosa Aelxis  @UL@  presents today for evaluation of his left elbow pain. Patient states the pain is better but still has slight pain from time to time. Past Medical History:   Diagnosis Date    Arthritis     Asthma     Breathing difficulty     CAD (coronary artery disease)     COPD (chronic obstructive pulmonary disease) (HCC)     Non-rheumatic tricuspid valve insufficiency     Nonrheumatic mitral (valve) insufficiency      Past Surgical History:   Procedure Laterality Date    APPENDECTOMY      COLONOSCOPY      CORONARY ANGIOPLASTY WITH STENT PLACEMENT  12/22/2020    DR. Mandel Resolute Christopher DUANE 2.25x18 Mid LAD, Resolute Christopher DUANE 2.5x18 Mid Cx.   EF 65%    HERNIA REPAIR      INTRACAPSULAR CATARACT EXTRACTION Left 4/6/2021    LEFT   CATARACT EXTRACTION  IOL IMPLANT I STENT performed by Rickey Bob MD at [address] [facility]  Bilateral 5/2/2022    BILATERAL L2 L3 L4 MEDIAL BRACH NERVE BLOCK (CPT 23664) performed by Essence Lopez MD at [facility]  Bilateral 6/13/2022    BILATERAL L3 L4 L5 MEDIAL BRANCH NERVE BLOCK performed by Essence Lopez MD at [place] [address]      right wrist carpal tunnel release, right long trigger finger release    OTHER SURGICAL HISTORY Right 01/20/2016    right shoulder arthroscopy acromioplasty with decompression rotator cuff repair     OTHER SURGICAL HISTORY Left 04/06/2021    LEFT EYE CATERACT EXTRACTION WITH INTRA OCULAR IMPLANT WITH I STENT    PAIN MANAGEMENT PROCEDURE Bilateral 7/11/2022    BILATERAL L3, 4, 5 MEDIAL BRANCH RADIOFREQUENCY ABLATION (CPT U5742600) performed by Essence Lopez MD at [facility] ARTHROSCOPY  3/4/11    left rotator cuff repair       Current Outpatient Medications:     rosuvastatin (CRESTOR) 20 MG tablet, Take 1 tablet by mouth daily, Disp: 90 tablet, Rfl: 0    tamsulosin (FLOMAX) 0.4 MG capsule, Take 0.4 mg by mouth daily, Disp: , Rfl:     COMBIGAN 0.2-0.5 % ophthalmic solution, Place 1 drop into both eyes 2 times daily, Disp: , Rfl:     brinzolamide (AZOPT) 1 % ophthalmic suspension, Place 1 drop into both eyes nightly, Disp: , Rfl:     budesonide-formoterol (SYMBICORT) 160-4.5 MCG/ACT AERO, Inhale 2 puffs into the lungs 2 times daily, Disp: 3 each, Rfl: 3    albuterol sulfate  (90 Base) MCG/ACT inhaler, Inhale 2 puffs into the lungs every 4 hours as needed for Wheezing or Shortness of Breath, Disp: 3 each, Rfl: 3    albuterol (PROVENTIL) (2.5 MG/3ML) 0.083% nebulizer solution, Take 3 mLs by nebulization every 4 hours as needed for Wheezing, Disp: 360 each, Rfl: 3    aspirin 81 MG chewable tablet, Take 1 tablet by mouth daily, Disp: 90 tablet, Rfl: 3    latanoprost (XALATAN) 0.005 % ophthalmic solution, instill 1 drop into both eyes at bedtime, Disp: , Rfl:     Arginine 500 MG CAPS, Take 2 capsules by mouth 3 times daily , Disp: , Rfl:     Omega-3 Fatty Acids (FISH OIL) 1200 MG CAPS, Take by mouth 2 times daily, Disp: , Rfl:     Multiple Vitamins-Minerals (THERAPEUTIC MULTIVITAMIN-MINERALS) tablet, Take 1 tablet by mouth 2 times daily, Disp: , Rfl:     gabapentin (NEURONTIN) 300 MG capsule, TAKE 1 CAPSULE BY MOUTH IN THE MORNING AND AT BEDTIME FOR 30 DAYS.  INTENDED SUPPLY: 90 DAYS, Disp: 60 capsule, Rfl: 0  No Known Allergies  Social History     Socioeconomic History    Marital status:      Spouse name: Not on file    Number of children: Not on file    Years of education: Not on file    Highest education level: Not on file   Occupational History    Not on file   Tobacco Use    Smoking status: Former     Packs/day: 1.50     Years: 40.00     Pack years: 60.00     Types: Cigarettes     Start date: 56     Quit date: 2016     Years since quittin.7    Smokeless tobacco: Never   Vaping Use    Vaping Use: Never used   Substance and Sexual Activity Alcohol use: No     Comment: 6 cups a day of coffee    Drug use: No    Sexual activity: Not on file   Other Topics Concern    Not on file   Social History Narrative    Not on file     Social Determinants of Health     Financial Resource Strain: Not on file   Food Insecurity: Not on file   Transportation Needs: Not on file   Physical Activity: Sufficiently Active    Days of Exercise per Week: 2 days    Minutes of Exercise per Session: 120 min   Stress: Not on file   Social Connections: Not on file   Intimate Partner Violence: Not on file   Housing Stability: Not on file     Family History   Problem Relation Age of Onset    No Known Problems Mother     Heart Disease Father     Heart Attack Father     High Blood Pressure Father     High Cholesterol Father     Diabetes Father     Arrhythmia Sister        REVIEW OF SYSTEMS:     General/Constitution:  (-)weight loss, (-)fever, (-)chills, (-)weakness. Skin: (-) rash,(-) psoriasis,(-) eczema, (-)skin cancer. Musculoskeletal: (-) fractures,  (-) dislocations,(-) collagen vascular disease, (-) fibromyalgia, (-) multiple sclerosis, (-) muscular dystrophy, (-) RSD,(-) joint pain (-)swelling, (-) joint pain,swelling. Neurologic: (-) epilepsy, (-)seizures,(-) brain tumor,(-) TIA, (-)stroke, (-)headaches, (-)Parkinson disease,(-) memory loss, (-) LOC. Cardiovascular: (-) Chest pain, (-) swelling in legs/feet, (-) SOB, (-) cramping in legs/feet with walking. Respiratory: (-) SOB, (-) Coughing, (-) night sweats. GI: (-) nausea, (-) vomiting, (-) diarrhea, (-) blood in stool, (-) gastric ulcer. Psychiatric: (-) Depression, (-) Anxiety, (-) bipolar disease, (-) Alzheimer's Disease  Allergic/Immunologic: (-) allergies latex, (-) allergies metal, (-) skin sensitivity.   Hematlogic: (-) anemia, (-) blood transfusion, (-) DVT/PE, (-) Clotting disorders       Subjective:      Constitution:    Temp 98 °F (36.7 °C)   Ht 5' 10\" (1.778 m)   Wt 176 lb (79.8 kg)   BMI 25.25 kg/m² Psycihatric:    The patient is alert and oriented x 3, appears to be stated age and in no distress. Respiratory:    Respiratory effort is not labored. Patient is not gasping. Palpation of the chest reveals no tactile fremitus. Skin:    Upon inspection: the skin appears warm, dry and intact. There is not a previous scar over the affected area. There is not any cellulitis, lymphedema or cutaneous lesions noted in the lower extremities. Upon palpation there is no induration noted. Neurologic:      Motor exam of the upper extremities show: The reflexes in biceps/triceps/brachioradialis are equal and symmetric. Sensory exam C5-T1 are normal bilaterally. Cardiovascular: The vascular exam is normal and is well perfused to distal extremities. There are 2+ radial pulses bilaterally, and motor and sensation is intact to median, ulnar, and radial, musclocutaneus, and axillary nerve distribution and grossly symmetric bilaterally. There is cap refill noted less than two seconds in all digits. There is not edema of the bilateral upper extremities. There is not varicosities noted in the distal extremities. Lymph:    Upon palpation,  there is no lymphadenopathy noted in bilateral upper extremities. Musculoskeletal:    Gait: normal; examination of the nails and digits reveal no cyanosis or clubbing. Cervical Exam:    On physical exam, Alexandr Justin is well-developed, well-nourished, oriented to person, place and time. his gait is normal.  On evaluation of his cervical spine, he has full range of motion of the cervical spine without pain. There is no cervical tenderness to palpation. Hip exam:   Upon inspection, there is not deformity noted. Upon palpation there is not tenderness. ROM: is  full and symmetrical.   Strength: Hip Flexors 5/5; Hip Abductors 5/5; Hip Adduction 5/5.      Knee exam:  Right knee exam shows;  range of motion of R. Knee is 0 to 110, and L. Knee is 0 to 110. The patient does have  pain on motion, effusion is mild, there is tenderness over the  medial region, there are not any masses, there is not ligamentous instability, there is  deformity noted. Knee exam: right positive for moderate crepitations, some mild tenderness laxity is not noted with  stress. There is not a popliteal cyst.    R. Knee:  Lachman's negative, Anterior Drawer negative, Posterior Drawer negative  Nargis's positive, Thallasy  positive,   PF grind test positive, Apprehension test negative, Patellar J sign  negative  L. Knee:  Lachman's negative, Anterior Drawer negative, Posterior Drawer negative  Nargis's negative, Thallasy  negative,   PF grind test negative, Apprehension test negative,  Patellar J sign  negative    Xray:  Joint narrowing    MRI:  n/a    Radiographic findings reviewed with patient    Assessment:   Encounter Diagnosis   Name Primary? Primary osteoarthritis of right knee Yes       Plan:    Natural history and expected course discussed. Questions answered. Rest, ice, compression, and elevation (RICE) therapy. Reduction in offending activity. Tennis elbow splint. I will proceed with a cortisone injection in the Right knee. Verbal and written consent was obtained for the injections. The skin was prepped with alcohol. 1mL of Kenalog 40mg and 9mL of 0.25% Marcaine was  injected to Right knee. The injection was given through the lateral side of the knee. The patient tolerated the injection well.  I will see the patient back prn

## 2022-09-29 NOTE — PROGRESS NOTES
This patient was referred for audiometric and tympanometric testing by his PCP, due to bilateral tinnitus and a decrease in hearing sensitivity, that is worse in the left ear. Patient denied ear pain/pressure and vertigo, at this time. Audiometry using pure tone air and bone conduction testing revealed a mild-to-moderately-severe  sensorineural hearing loss, at 1500-8000Hz, bilaterally. Reliability was fair. Speech reception thresholds were in good agreement with the pure tone averages, bilaterally. Speech discrimination scores were 88%, right ear and 80%, left ear at [address] [place] [address]. Tympanometry revealed normal middle ear peak pressure and compliance, bilaterally. Ipsilateral acoustic reflexes were absent, bilaterally at 1000Hz. The results were reviewed with the patient. An ENT consult is recommended, due to asymmetrical hearing loss (left ear; worse).       Juan Carlos Kimbrough CCC/DMITRY  Audiologist  [id number]  NPI#:  [id number]      Electronically signed by Marisa Bonilla on 9/29/2022 at 7:13 AM

## 2022-10-03 ENCOUNTER — OFFICE VISIT (OUTPATIENT)
Dept: PRIMARY CARE CLINIC | Age: 67
End: 2022-10-03
Payer: MEDICARE

## 2022-10-03 VITALS
WEIGHT: 177.3 LBS | HEART RATE: 56 BPM | BODY MASS INDEX: 25.38 KG/M2 | TEMPERATURE: 96.9 F | OXYGEN SATURATION: 98 % | DIASTOLIC BLOOD PRESSURE: 60 MMHG | HEIGHT: 70 IN | SYSTOLIC BLOOD PRESSURE: 116 MMHG

## 2022-10-03 DIAGNOSIS — I25.10 CAD IN NATIVE ARTERY: ICD-10-CM

## 2022-10-03 DIAGNOSIS — E78.2 MIXED HYPERLIPIDEMIA: Primary | ICD-10-CM

## 2022-10-03 DIAGNOSIS — H90.3 SENSORINEURAL HEARING LOSS OF BOTH EARS: ICD-10-CM

## 2022-10-03 PROCEDURE — 99213 OFFICE O/P EST LOW 20 MIN: CPT | Performed by: INTERNAL MEDICINE

## 2022-10-03 PROCEDURE — 1123F ACP DISCUSS/DSCN MKR DOCD: CPT | Performed by: INTERNAL MEDICINE

## 2022-10-03 RX ORDER — ROSUVASTATIN CALCIUM 20 MG/1
20 TABLET, COATED ORAL DAILY
Qty: 90 TABLET | Refills: 0 | Status: SHIPPED | OUTPATIENT
Start: 2022-10-03

## 2022-10-03 SDOH — ECONOMIC STABILITY: FOOD INSECURITY: WITHIN THE PAST 12 MONTHS, YOU WORRIED THAT YOUR FOOD WOULD RUN OUT BEFORE YOU GOT MONEY TO BUY MORE.: NEVER TRUE

## 2022-10-03 SDOH — ECONOMIC STABILITY: FOOD INSECURITY: WITHIN THE PAST 12 MONTHS, THE FOOD YOU BOUGHT JUST DIDN'T LAST AND YOU DIDN'T HAVE MONEY TO GET MORE.: NEVER TRUE

## 2022-10-03 ASSESSMENT — SOCIAL DETERMINANTS OF HEALTH (SDOH): HOW HARD IS IT FOR YOU TO PAY FOR THE VERY BASICS LIKE FOOD, HOUSING, MEDICAL CARE, AND HEATING?: NOT HARD AT ALL

## 2022-10-03 NOTE — PROGRESS NOTES
Chief Complaint   Patient presents with    Follow-up     From July/ June 2022,  with labs . Had xrays for Dr. Lizz Singer,  was evaluated by audiology note in computer [facility])   1. Son passed away in August from complications with diabetes. Decision maker for patient needs changed. [place] Dr. Lizz Singer for left arm and right knee . Other     Care gaps-  due for colonoscopy,  and due for AAA screen. HPI:  Patient is here for follow-up   Denied cardiopulmonary symptoms GI symptoms. Had a recent hearing evaluation mild-to-moderately-severe  sensorineural hearing loss, at 1500-8000Hz, bilaterally. Ear nose and throat evaluation was made. Patient is compliant on medications requested refill on statin. Patient also had an x-ray of the right knee showed osteoarthritis received an injection by orthopedic surgeon    Past Medical History, Surgical History, and Family History has been reviewed and updated.     Review of Systems:  Constitutional:  No fever, no fatigue, no chills, no headaches, no weight change  Dermatology:  No rash, no mole, no dry or sensitive skin  ENT:  No cough, no sore throat, no sinus pain, no runny nose, no ear pain  Cardiology:  No chest pain, no palpitations, no leg edema, no shortness of breath, no PND  Gastroenterology:  No dysphagia, no abdominal pain, no nausea, no vomiting, no constipation, no diarrhea, no heartburn  Musculoskeletal:  No joint pain, no leg cramps, no back pain, no muscle aches  Respiratory:  No shortness of breath, no orthopnea, no wheezing, no OJEDA, no hemoptysis  Urology:  No blood in the urine, no urinary frequency, no urinary incontinence, no urinary urgency, no nocturia, no dysuria    Vitals:    10/03/22 1146   BP: 116/60   Site: Left Upper Arm   Position: Sitting   Cuff Size: Large Adult   Pulse: 56   Temp: 96.9 °F (36.1 °C)   SpO2: 98%   Weight: 177 lb 4.8 oz (80.4 kg)   Height: 5' 10\" (1.778 m)       General:  Patient alert and oriented x 3, NAD, pleasant  HEENT:  Atraumatic, normocephalic, PERRLA, EOMI, clear conjunctiva, TMs clear, nose-clear, throat - no erythema  Neck:  Supple, no goiter, no carotid bruits, no LAD  Lungs:  CTA   Heart:  RRR, no murmurs, gallops or rubs  Abdomen:  Soft/nt/nd, + bowel sounds  Extremities:  No clubbing, cyanosis or edema  Skin: unremarkable    Hemoglobin A1C   Date Value Ref Range Status   12/23/2020 5.4 4.0 - 5.6 % Final     Cholesterol, Total   Date Value Ref Range Status   06/22/2022 125 0 - 199 mg/dL Final     Triglycerides   Date Value Ref Range Status   06/22/2022 77 0 - 149 mg/dL Final     HDL   Date Value Ref Range Status   06/22/2022 46 >40 mg/dL Final     LDL Calculated   Date Value Ref Range Status   06/22/2022 64 0 - 99 mg/dL Final     VLDL Cholesterol Calculated   Date Value Ref Range Status   06/22/2022 15 mg/dL Final     Sodium   Date Value Ref Range Status   06/22/2022 142 132 - 146 mmol/L Final     Potassium   Date Value Ref Range Status   06/22/2022 3.6 3.5 - 5.0 mmol/L Final     Chloride   Date Value Ref Range Status   06/22/2022 105 98 - 107 mmol/L Final     CO2   Date Value Ref Range Status   06/22/2022 21 (L) 22 - 29 mmol/L Final     BUN   Date Value Ref Range Status   06/22/2022 12 6 - 23 mg/dL Final     Creatinine   Date Value Ref Range Status   06/22/2022 0.8 0.7 - 1.2 mg/dL Final     Glucose   Date Value Ref Range Status   06/22/2022 102 (H) 74 - 99 mg/dL Final     Calcium   Date Value Ref Range Status   06/22/2022 9.0 8.6 - 10.2 mg/dL Final     Total Protein   Date Value Ref Range Status   06/22/2022 7.1 6.4 - 8.3 g/dL Final     Albumin   Date Value Ref Range Status   06/22/2022 4.3 3.5 - 5.2 g/dL Final     Total Bilirubin   Date Value Ref Range Status   06/22/2022 0.7 0.0 - 1.2 mg/dL Final     Alkaline Phosphatase   Date Value Ref Range Status   06/22/2022 53 40 - 129 U/L Final     AST   Date Value Ref Range Status   06/22/2022 27 0 - 39 U/L Final     ALT   Date Value Ref Range Status 06/22/2022 30 0 - 40 U/L Final     GFR Non-   Date Value Ref Range Status   06/22/2022 >60 >=60 mL/min/1.73 Final     Comment:     Chronic Kidney Disease: less than 60 ml/min/1.73 sq.m. Kidney Failure: less than 15 ml/min/1.73 sq.m. Results valid for patients 18 years and older. GFR    Date Value Ref Range Status   06/22/2022 >60  Final        XR KNEE RIGHT (MIN 4 VIEWS)    Result Date: 9/27/2022  EXAMINATION: FOUR XRAY VIEWS OF THE RIGHT KNEE 9/27/2022 11:08 am COMPARISON: None. HISTORY: ORDERING SYSTEM PROVIDED HISTORY: Primary osteoarthritis of right knee FINDINGS: Mild tricompartmental osteoarthritis and lateral joint space narrowing. There are no acute fractures or dislocations. There is mild suprapatellar soft tissue swelling. Mild tricompartmental osteoarthritis and lateral joint space narrowing. Suspect minimal joint effusion.         Assessment/Plan:    Hyperlipidemia  Coronary artery disease  COPD  Hearing impairment right ear  Right knee osteoarthritis      Outpatient Encounter Medications as of 10/3/2022   Medication Sig Dispense Refill    rosuvastatin (CRESTOR) 20 MG tablet Take 1 tablet by mouth daily 90 tablet 0    COMBIGAN 0.2-0.5 % ophthalmic solution Place 1 drop into both eyes 2 times daily      brinzolamide (AZOPT) 1 % ophthalmic suspension Place 1 drop into both eyes nightly      budesonide-formoterol (SYMBICORT) 160-4.5 MCG/ACT AERO Inhale 2 puffs into the lungs 2 times daily 3 each 3    albuterol sulfate  (90 Base) MCG/ACT inhaler Inhale 2 puffs into the lungs every 4 hours as needed for Wheezing or Shortness of Breath 3 each 3    albuterol (PROVENTIL) (2.5 MG/3ML) 0.083% nebulizer solution Take 3 mLs by nebulization every 4 hours as needed for Wheezing 360 each 3    aspirin 81 MG chewable tablet Take 1 tablet by mouth daily 90 tablet 3    latanoprost (XALATAN) 0.005 % ophthalmic solution instill 1 drop into both eyes at bedtime Arginine 500 MG CAPS Take 2 capsules by mouth 3 times daily       Omega-3 Fatty Acids (FISH OIL) 1200 MG CAPS Take by mouth 2 times daily      Multiple Vitamins-Minerals (THERAPEUTIC MULTIVITAMIN-MINERALS) tablet Take 1 tablet by mouth 2 times daily      [DISCONTINUED] rosuvastatin (CRESTOR) 20 MG tablet Take 1 tablet by mouth daily 90 tablet 0    [DISCONTINUED] gabapentin (NEURONTIN) 300 MG capsule TAKE 1 CAPSULE BY MOUTH IN THE MORNING AND AT BEDTIME FOR 30 DAYS. INTENDED SUPPLY: 90 DAYS (Patient not taking: Reported on 10/3/2022) 60 capsule 0    tamsulosin (FLOMAX) 0.4 MG capsule Take 0.4 mg by mouth daily (Patient not taking: Reported on 10/3/2022)       No facility-administered encounter medications on file as of 10/3/2022. Valentino Belts was seen today for follow-up and other. Diagnoses and all orders for this visit:    Mixed hyperlipidemia  -     rosuvastatin (CRESTOR) 20 MG tablet; Take 1 tablet by mouth daily    Hearing loss of right ear, unspecified hearing loss type  -     Donald Gan MD, Otolaryngology, [place]    CAD in native artery         There are no Patient Instructions on file for this visit.            Kelley Wilson MD   10/3/22

## 2022-10-12 ENCOUNTER — TELEPHONE (OUTPATIENT)
Dept: CASE MANAGEMENT | Age: 67
End: 2022-10-12

## 2022-10-12 NOTE — TELEPHONE ENCOUNTER
I called the patient and he confirmed his CT lung screening at [facility] on 10/13/2022 at 10:00 am.  I reminded the patient to arrive at 9:30 am, enter through the main entrance, and register. Patient confirmed.           Electronically signed by Aleksander Alaniz on 10/12/22 at 1:14 PM EDT

## 2022-10-13 ENCOUNTER — HOSPITAL ENCOUNTER (OUTPATIENT)
Dept: CT IMAGING | Age: 67
Discharge: HOME | End: 2022-10-13
Payer: MEDICARE

## 2022-10-13 DIAGNOSIS — Z87.891 FORMER TOBACCO USE: ICD-10-CM

## 2022-10-13 PROCEDURE — 71271 CT THORAX LUNG CANCER SCR C-: CPT

## 2022-10-14 ENCOUNTER — TELEPHONE (OUTPATIENT)
Dept: CASE MANAGEMENT | Age: 67
End: 2022-10-14

## 2022-10-14 NOTE — TELEPHONE ENCOUNTER
No call, encounter opened to process CT Lung Screening. CT Lung Screen: 10/13/2022    Impression   1. There is no pulmonary infiltrate, mass or suspicious pulmonary nodule. 2. Benign calcified granulomatous disease. LUNG RADS:   Per ACR Lung-RADS Version 1.1       Category 2, Benign appearance or behavior. Management:  Continue annual lung screening with LDCT in 12 months. RECOMMENDATIONS:   If you would like to register your patient with the [facility], please contact the Nurse Navigator at   [phone]. Pack years: 61    Social History     Tobacco Use  Smoking Status: Former Smoker    Start Date: 1969   Quit Date: 2016   Types: Cigarettes   Packs/Day: 1.5   Years: 36   Pack Years: 61   Smokeless Tobacco: Never         Results letter sent to patient via my chart or mailed.      [address]

## 2022-11-21 ENCOUNTER — OFFICE VISIT (OUTPATIENT)
Dept: PULMONOLOGY | Age: 67
End: 2022-11-21
Payer: MEDICARE

## 2022-11-21 VITALS
TEMPERATURE: 97.7 F | BODY MASS INDEX: 25.83 KG/M2 | OXYGEN SATURATION: 98 % | DIASTOLIC BLOOD PRESSURE: 68 MMHG | SYSTOLIC BLOOD PRESSURE: 124 MMHG | HEART RATE: 59 BPM | WEIGHT: 180 LBS

## 2022-11-21 DIAGNOSIS — Z87.891 PERSONAL HISTORY OF TOBACCO USE: ICD-10-CM

## 2022-11-21 DIAGNOSIS — J43.8 OTHER EMPHYSEMA (HCC): Primary | ICD-10-CM

## 2022-11-21 PROCEDURE — G0296 VISIT TO DETERM LDCT ELIG: HCPCS | Performed by: INTERNAL MEDICINE

## 2022-11-21 PROCEDURE — 99213 OFFICE O/P EST LOW 20 MIN: CPT | Performed by: INTERNAL MEDICINE

## 2022-11-21 PROCEDURE — 1123F ACP DISCUSS/DSCN MKR DOCD: CPT | Performed by: INTERNAL MEDICINE

## 2022-11-21 RX ORDER — FLUTICASONE PROPIONATE 50 MCG
1 SPRAY, SUSPENSION (ML) NASAL DAILY
Qty: 16 G | Refills: 0 | Status: SHIPPED | OUTPATIENT
Start: 2022-11-21

## 2022-11-21 NOTE — PROGRESS NOTES
[facility]     HISTORY OF PRESENT ILLNESS:    Yolie Cuellar is a 79y.o. year old male here for evaluation of shortness of breath, history of emphysema. The patient reports that overall in the last year he has noticed increased dyspnea with exertion. He reports that he recently had stents placed in his heart after undergoing a stress test.  Initially after having the procedure he participated in cardiac rehab and reports that his breathing did feel better than however he now notices increased shortness of breath with exertion and example of which is when he walks to clocking in the morning and then at other times while he is exerting himself throughout the day. He denies any fevers or chills. Denies a productive cough. He is currently prescribed Trelegy however feels that when he was taking Symbicort and Spiriva in the past this was more helpful. He also denies any wheezing. He is a former smoker and reports he quit 5 years ago. He sees Dr. Didi Johansen from cardiology and reports that he thinks that he has had an echocardiogram there recently. He has not been vaccinated against COVID-19      Updated HPI as of 12/30/2021. Patient currently denies any symptoms. He reports that he did have a Covid test that was positive on December 21, 2021. He reports that at that time his symptoms consisted mostly of congestion and cough. His daughter also tested positive at the same time. Overall he reports that his shortness of breath has improved. 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 He currently denies any coughing, wheezing, or dyspnea with exertion. He states that he also feels that the Symbicort has helped with his breathing. He is currently on Symbicort, Spiriva, and albuterol as needed.   He is having difficulty affording his prescriptions and we will refer him to the prescription assistance program.    Updated HPI as of June 7, 2022:  Patient seen and examined. Reports that overall he is having no symptoms with his breathing. He reports that he feels like he is doing better when going up and down stairs. 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 Reports that he did recently use Mucinex due to some allergies. He is compliant with his Symbicort he has not had to use his albuterol rescue inhaler recently he has not used his Proventil nebulizer since December. He is currently not taking the Spiriva due to cost.  Upon further discussion the patient does report that he has been diagnosed with glaucoma. Given this and the fact that his symptoms currently seem to be well controlled we will not place him back on Spiriva at this time. Updated as of November 21, 2022:  Patient seen and examined. He reports that overall his breathing is stable. He is having some symptoms where when he uses his eyedrops approximately 30 minutes later he developed a strange taste in his mouth and has a lot of congestion and feelings of having to clear his throat. He is continuing to use his Symbicort. He is using his rescue inhaler once a day but reports that he is really using it more prophylactically than for actual symptoms. He is still working for the tree service but not as much as before. He did discuss possibly joining a gym in order to be more active. Reviewed results of patient's CT of the chest.  We will plan to repeat in 1 year.     ALLERGIES:  No Known Allergies    PAST MEDICAL HISTORY:       Diagnosis Date    Arthritis     Asthma     Breathing difficulty     CAD (coronary artery disease)     COPD (chronic obstructive pulmonary disease) (HCC)     Non-rheumatic tricuspid valve insufficiency     Nonrheumatic mitral (valve) insufficiency        MEDICATIONS:   Current Outpatient Medications   Medication Sig Dispense Refill    fluticasone (FLONASE) 50 MCG/ACT nasal spray 1 spray by Each Nostril route daily 16 g 0    rosuvastatin (CRESTOR) 20 MG tablet Take 1 tablet by mouth daily 90 tablet 0    tamsulosin (FLOMAX) 0.4 MG capsule Take 0.4 mg by mouth daily      COMBIGAN 0.2-0.5 % ophthalmic solution Place 1 drop into both eyes 2 times daily      brinzolamide (AZOPT) 1 % ophthalmic suspension Place 1 drop into both eyes nightly      budesonide-formoterol (SYMBICORT) 160-4.5 MCG/ACT AERO Inhale 2 puffs into the lungs 2 times daily 3 each 3    albuterol sulfate  (90 Base) MCG/ACT inhaler Inhale 2 puffs into the lungs every 4 hours as needed for Wheezing or Shortness of Breath 3 each 3    albuterol (PROVENTIL) (2.5 MG/3ML) 0.083% nebulizer solution Take 3 mLs by nebulization every 4 hours as needed for Wheezing 360 each 3    aspirin 81 MG chewable tablet Take 1 tablet by mouth daily 90 tablet 3    latanoprost (XALATAN) 0.005 % ophthalmic solution instill 1 drop into both eyes at bedtime      Arginine 500 MG CAPS Take 2 capsules by mouth 3 times daily       Omega-3 Fatty Acids (FISH OIL) 1200 MG CAPS Take by mouth 2 times daily      Multiple Vitamins-Minerals (THERAPEUTIC MULTIVITAMIN-MINERALS) tablet Take 1 tablet by mouth 2 times daily       No current facility-administered medications for this visit. SOCIAL AND OCCUPATIONAL HEALTH: The patient is a former smoker who quit smoking 6 years ago. He works for a tree service during the day and then as a  on second shift. 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 He denies any travel history he does not have any pets. SURGICAL HISTORY:   Past Surgical History:   Procedure Laterality Date    APPENDECTOMY      COLONOSCOPY      CORONARY ANGIOPLASTY WITH STENT PLACEMENT  12/22/2020    DR. Mandel [employer] [place] DUANE 2.25x18 Mid LAD, [employer] [place] DUANE 2.5x18 Mid Cx.   EF 65%    HERNIA REPAIR      INTRACAPSULAR CATARACT EXTRACTION Left 4/6/2021    LEFT   CATARACT EXTRACTION  IOL IMPLANT I STENT performed by Jasmin Fragoso MD at [facility]  Bilateral 5/2/2022    BILATERAL L2 L3 L4 MEDIAL BRACH NERVE BLOCK (CPT 90053) performed by Mamadou Torres MD at [facility]  Bilateral 6/13/2022    BILATERAL L3 L4 L5 MEDIAL BRANCH NERVE BLOCK performed by Mamadou Torres MD at [address]      right wrist carpal tunnel release, right long trigger finger release    OTHER SURGICAL HISTORY Right 01/20/2016    right shoulder arthroscopy acromioplasty with decompression rotator cuff repair     OTHER SURGICAL HISTORY Left 04/06/2021    LEFT EYE CATERACT EXTRACTION WITH INTRA OCULAR IMPLANT WITH I STENT    PAIN MANAGEMENT PROCEDURE Bilateral 7/11/2022    BILATERAL L3, 4, 5 MEDIAL BRANCH RADIOFREQUENCY ABLATION (CPT 60274) performed by Mamadou Torres MD at [address] [facility] ARTHROSCOPY  3/4/11    left rotator cuff repair       FAMILY HISTORY: No family history of cancer, blood clots     REVIEW OF SYSTEMS:  Constitutional: No fevers chills unintentional weight loss  Skin: No rashes or lesions  EENT: Positive for change in taste and throat clearing 30 minutes after taking his eyedrops  Cardiovascular: Currently denies chest pain chest pressure or palpitations  Respiration: Currently reports dyspnea on exertion has improved. Denies cough. Denies wheezing.   Gastrointestinal: Denies nausea, vomiting, diarrhea  Musculoskeletal: Patient reports a sense of generalized weakness  Neurological: Denies headache, dizziness, seizures  Psychological: Denies anxiety or depression  Endocrine: Denies polyuria polydipsia heat or cold intolerance  Hematopoietic/lymphatic: Positive for easy bruising    PHYSICAL EXAMINATION:  Constitutional: Well-nourished well-developed no acute distress  EENT: PERRL, EOMI  Neck: Trachea and thyroid midline  Respiratory: Clear to auscultation bilaterally  Cardiovascular: Regular rate and rhythm no murmurs rubs or gallops  Pulses: Equal bilaterally  Abdomen: Flat  Lymphatic: No palpable lymphadenopathy  Musculoskeletal: Gait steady  Extremities: No cyanosis, edema  Skin: Typical areas of ecchymosis on the upper extremities bilaterally  Neurological/Psychiatric: Affect appropriate    DATA:Review of spirometry from/2021 as below/spirometry demonstrates FVC of 4.17 L which is 97% of predicted. FEV1 is 3.05 which is 93% of predicted. FEV1 FVC ratio is 73 which is above the lower limit of normal.  MVV is 107 which is 82% of predicted. There is no significant bronchodilator response. SVC is 4.27 which is 100% of predicted. RV is 2.17 which is 93% of predicted. Total lung capacity is 6.44 which is 94% of predicted. Diffusion is 11.58 which is 37% of predicted. Flow volume loop does show minimal scooping in the expiratory limb. Overall pulmonary function testing is negative for restriction or obstruction. There is no evidence of air trapping. There is a moderate reduction in DLCO. Spirometry as of June 7, 2022:  FVC is 3.8 L which is 89% of predicted. FEV1 is 2.57 L which is 79% of predicted. FEV1 FVC ratio 68. MVV is 69 which is 53% of predicted. SVC is 3.54 L which is 83% of predicted. Flow volume loop shows scooping consistent with obstruction. Overall FEV1 has slightly decreased from full pulmonary function testing which was done in May 21. However patient does seem significantly better from a symptomatic standpoint this may simply be a difference in the method of testing. IMPRESSION:       Former tobacco use  History of CAD  COPD Gold stage II  Vaccine counseling         PLAN:      Continue Symbicort and as needed rescue inhaler  Patient is continuing to follow-up with cardiology  Patient declines COVID-19 and influenza vaccine. He is due for pneumococcal 23 in 2025. Reviewed results of patient's low-dose lung cancer screening CT. Repeat in 1 year.   Patient to return to clinic in 6 months with spirometry at that visit. I hope this updates you on my evaluation and clinical thinking. Thank you for allowing me to participate in his care. Sincerely,        Sari Woodson.  Office: [phone]  Fax: [phone]    Low Dose CT (LDCT) Lung Screening criteria met   Age 50-69   Pack year smoking >30   Still smoking or less than 15 year since quit   No sign or symptoms of lung cancer   > 11 months since last LDCT     Risks and benefits of lung cancer screening with LDCT scans discussed:    Significance of positive screen - False-positive LDCT results often occur. 95% of all positive results do not lead to a diagnosis of cancer. Usually further imaging can resolve most false-positive results; however, some patients may require invasive procedures. Over diagnosis risk - 10% to 12% of screen-detected lung cancer cases are over diagnosed--that is, the cancer would not have been detected in the patient's lifetime without the screening. Need for follow up screens annually to continue lung cancer screening effectiveness     Risks associated with radiation from annual LDCT- Radiation exposure is about the same as for a mammogram, which is about 1/3 of the annual background radiation exposure from everyday life. Starting screening at age 54 is not likely to increase cancer risk from radiation exposure. Patients with comorbidities resulting in life expectancy of < 10 years, or that would preclude treatment of an abnormality identified on CT, should not be screened due to lack of benefit.     To obtain maximal benefit from this screening, smoking cessation and long-term abstinence from smoking is critical          Low Dose CT (LDCT) Lung Screening criteria met:     Age 50-77(Medicare) or 50-80 (USPSTF)   Pack year smoking >20   Still smoking or less than 15 year since quit   No sign or symptoms of lung cancer   > 11 months since last LDCT     Risks and benefits of lung cancer screening with LDCT scans discussed:    Significance of positive screen - False-positive LDCT results often occur. 95% of all positive results do not lead to a diagnosis of cancer. Usually further imaging can resolve most false-positive results; however, some patients may require invasive procedures. Over diagnosis risk - 10% to 12% of screen-detected lung cancer cases are over diagnosed--that is, the cancer would not have been detected in the patient's lifetime without the screening. Need for follow up screens annually to continue lung cancer screening effectiveness     Risks associated with radiation from annual LDCT- Radiation exposure is about the same as for a mammogram, which is about 1/3 of the annual background radiation exposure from everyday life. Starting screening at age 54 is not likely to increase cancer risk from radiation exposure. Patients with comorbidities resulting in life expectancy of < 10 years, or that would preclude treatment of an abnormality identified on CT, should not be screened due to lack of benefit.     To obtain maximal benefit from this screening, smoking cessation and long-term abstinence from smoking is critical

## 2022-11-21 NOTE — PATIENT INSTRUCTIONS
[facility]  [address] [place], [place] [address], [facility] Christiano MCGHEE  Office: [phone]      Your were seen in the office today for COPD      We  did make changes to your medications today. Start flonase nasal spray daily. Please discuss the side affects of your eye drops with your eye doctor    Testing ordered today was none. We will do spirometry prior to your next visit. Vaccines recommended Influenza, Pneumovax due 2025      Return to clinic in 6 months. Please do not hesitate to call the office with any questions. Learning About Lung Cancer Screening  What is screening for lung cancer? Lung cancer screening is a way to find some lung cancers early, before a person has any symptoms of the cancer. Lung cancer screening may help those who have the highest risk for lung cancer--people age 48 and older who are or were heavy smokers. For most people, who aren't at increased risk, screening for lung cancer probably isn't helpful. Screening won't prevent cancer. And it may not find all lung cancers. Lung cancer screening may lower the risk of dying from lung cancer in a small number of people. How is it done? Lung cancer screening is done with a low-dose CT (computed tomography) scan. A CT scan uses X-rays, or radiation, to make detailed pictures of your body. Experts recommend that screening be done in medical centers that focus on finding and treating lung cancer. Who is screening recommended for? Lung cancer screening is recommended for people age 48 and older who are or were heavy smokers. That means people with a smoking history of at least 20 pack years. A pack year is a way to measure how heavy a smoker you are or were. To figure out your pack years, multiply how many packs a day on average (assuming 20 cigarettes per pack) you have smoked by how many years you have smoked. For example:   If you smoked 1 pack a day for 20 years, that's 1 not a reason to avoid these tests for most people. What are the benefits of screening? Your scan may be normal (negative). For some people who are at higher risk, screening lowers the chance of dying of lung cancer. How much and how long you smoked helps to determine your risk level. Screening can find some cancers early, when treatment may be more likely to work. What happens after screening? The results of your CT scan will be sent to your doctor. Someone from your care team will explain the results of your scan and answer any questions you may have. If you need any follow-up, he or she will help you understand what to do next. After a lung cancer screening, you can go back to your usual activities right away. A lung cancer screening test can't tell if you have lung cancer. If your results are positive, your doctor can't tell whether an abnormal finding is a harmless nodule, cancer, or something else without doing more tests. What can you do to help prevent lung cancer? Some lung cancers can't be prevented. But if you smoke, quitting smoking is the best step you can take to prevent lung cancer. If you want to quit, your doctor can recommend medicines or other ways to help. Follow-up care is a key part of your treatment and safety. Be sure to make and go to all appointments, and call your doctor if you are having problems. It's also a good idea to know your test results and keep a list of the medicines you take. Where can you learn more? Go to [email or web address] org and sign in to your [employer] account. Enter H944 in the [facility] box to learn more about \"Learning About Lung Cancer Screening. \"     If you do not have an account, please click on the \"Sign Up Now\" link. Current as of: May 4, 2022               Content Version: 13.4  © 1960-8344 [employer], Incorporated. Care instructions adapted under license by [facility] ([facility]).  If you have questions about a medical condition or this instruction, always ask your healthcare professional. [place] [address] any warranty or liability for your use of this information.

## 2022-11-21 NOTE — PROGRESS NOTES
Discussed with the patient the current USPSTF guidelines released March 9, 2021 for screening for lung cancer. For adults aged 48 to [de-identified] years who have a 20 pack-year smoking history and currently smoke or have quit within the past 15 years the grade B recommendation is to:  Screen for lung cancer with low-dose computed tomography (LDCT) every year. Stop screening once a person has not smoked for 15 years or has a health problem that limits life expectancy or the ability to have lung surgery. The patient  reports that he quit smoking about 6 years ago. His smoking use included cigarettes. He started smoking about 53 years ago. He has a 60.00 pack-year smoking history. He has never used smokeless tobacco.. Discussed with patient the risks and benefits of screening, including over-diagnosis, false positive rate, and total radiation exposure. The patient currently exhibits no signs or symptoms suggestive of lung cancer. Discussed with patient the importance of compliance with yearly annual lung cancer screenings and willingness to undergo diagnosis and treatment if screening scan is positive. In addition, the patient was counseled regarding the importance of remaining smoke free and/or total smoking cessation.     Also reviewed the following if the patient has Medicare that as of February 10, 2022, Medicare only covers LDCT screening in patients aged 51-72 with at least a 20 pack-year smoking history who currently smoke or have quit in the last 15 years

## 2022-11-22 ENCOUNTER — OFFICE VISIT (OUTPATIENT)
Dept: PAIN MANAGEMENT | Age: 67
End: 2022-11-22
Payer: MEDICARE

## 2022-11-22 VITALS
TEMPERATURE: 97.7 F | OXYGEN SATURATION: 98 % | SYSTOLIC BLOOD PRESSURE: 122 MMHG | HEIGHT: 70 IN | BODY MASS INDEX: 25.77 KG/M2 | DIASTOLIC BLOOD PRESSURE: 76 MMHG | HEART RATE: 56 BPM | WEIGHT: 180 LBS

## 2022-11-22 DIAGNOSIS — M48.062 SPINAL STENOSIS OF LUMBAR REGION WITH NEUROGENIC CLAUDICATION: ICD-10-CM

## 2022-11-22 DIAGNOSIS — G89.4 CHRONIC PAIN SYNDROME: Primary | ICD-10-CM

## 2022-11-22 DIAGNOSIS — M51.9 LUMBAR DISC DISORDER: ICD-10-CM

## 2022-11-22 DIAGNOSIS — M47.816 LUMBAR SPONDYLOSIS: ICD-10-CM

## 2022-11-22 PROCEDURE — 99213 OFFICE O/P EST LOW 20 MIN: CPT | Performed by: PAIN MEDICINE

## 2022-11-22 PROCEDURE — 1123F ACP DISCUSS/DSCN MKR DOCD: CPT | Performed by: PAIN MEDICINE

## 2022-11-22 PROCEDURE — 99214 OFFICE O/P EST MOD 30 MIN: CPT | Performed by: PAIN MEDICINE

## 2022-11-22 RX ORDER — SODIUM CHLORIDE 0.9 % (FLUSH) 0.9 %
5-40 SYRINGE (ML) INJECTION EVERY 12 HOURS SCHEDULED
OUTPATIENT
Start: 2022-11-22

## 2022-11-22 RX ORDER — SODIUM CHLORIDE 0.9 % (FLUSH) 0.9 %
5-40 SYRINGE (ML) INJECTION PRN
OUTPATIENT
Start: 2022-11-22

## 2022-11-22 RX ORDER — SODIUM CHLORIDE 9 MG/ML
INJECTION, SOLUTION INTRAVENOUS PRN
OUTPATIENT
Start: 2022-11-22

## 2022-11-22 NOTE — PROGRESS NOTES
Do you currently have any of the following:    Fever: No  Headache:  No  Cough: No  Shortness of breath: No  Exposed to anyone with these symptoms: No                                                                                                                Betty Rowland presents to the [facility] on 11/22/2022. Carissa is complaining of pain in neck and back. The pain is constant. The pain is described as aching. Pain is rated on his best day at a 7, on his worst day at a 7, and on average at a 7 on the VAS scale. He took his last dose of Tylenol a few days ago. Carissa does not have issues with constipation. Any procedures since your last visit: Yes, with 50 % relief. He is  on NSAIDS and  is  on anticoagulation medications to include ASA and is managed by Melinda Rubi MD  .     Pacemaker or defibrillator: No.    Medication Contract and Consent for Opioid Use Documents Filed        No documents found                       There were no vitals taken for this visit. No LMP for male patient.

## 2022-11-22 NOTE — PROGRESS NOTES
[address]  [address] [facility], [address] [place] [address] [place]  [phone]    Follow up Note      Tanja Baker     Date of Visit:  11/22/2022    CC:  Patient presents for follow up   Chief Complaint   Patient presents with    Neck Pain    Back Pain     HPI:  Axial low back pain is better. Increased buttocks pain with prolonged standing and walking. Appropriate analgesia with current medications regimen: N/A. Change in quality of symptoms:yes. .    Medication side effects:not applicable . Recent diagnostic testing:none. Recent interventional procedures: Bilateral L3,4,5 MB RFA with more than 75% improvement in his axial low back pain. He has been on anticoagulation medications to include ASA. The patient  has not been on herbal supplements. The patient is not diabetic. Imaging:   Lumbar spine MRI   1. Significant multilevel spinal stenosis most severe L3-4.   2. Soft tissue structure along the right posterior margin of the L3   vertebra could be disc fragment or relatively less likely mass. Patient will be recalled for postcontrast imaging through this region   and an addendum issued. Previous treatments: Physical Therapy and medications. .          Potential Aberrant Drug-Related Behavior:    No    Urine Drug Screening:  No    OARRS report:  11/2022 consistent     Opioid Agreement:  Renewal date:N/A    Past Medical History:   Diagnosis Date    Arthritis     Asthma     Breathing difficulty     CAD (coronary artery disease)     COPD (chronic obstructive pulmonary disease) (HCC)     Non-rheumatic tricuspid valve insufficiency     Nonrheumatic mitral (valve) insufficiency      Past Surgical History:   Procedure Laterality Date    APPENDECTOMY      COLONOSCOPY      CORONARY ANGIOPLASTY WITH STENT PLACEMENT  12/22/2020    DR. Mandel Resolute [place] DUANE 2.25x18 Mid LAD, Resolute [place] DUANE 2.5x18 Mid Cx.   EF 65%    HERNIA REPAIR      INTRACAPSULAR CATARACT EXTRACTION Left 4/6/2021    LEFT   CATARACT EXTRACTION  IOL IMPLANT I STENT performed by Miguel Curran MD at [address]  Bilateral 5/2/2022    BILATERAL L2 L3 L4 MEDIAL BRACH NERVE BLOCK (CPT 61830) performed by Niles Colby MD at [address]  Bilateral 6/13/2022    BILATERAL L3 L4 L5 MEDIAL BRANCH NERVE BLOCK performed by Niles Colby MD at [address] [facility]      right wrist carpal tunnel release, right long trigger finger release    OTHER SURGICAL HISTORY Right 01/20/2016    right shoulder arthroscopy acromioplasty with decompression rotator cuff repair     OTHER SURGICAL HISTORY Left 04/06/2021    LEFT EYE CATERACT EXTRACTION WITH INTRA OCULAR IMPLANT WITH I STENT    PAIN MANAGEMENT PROCEDURE Bilateral 7/11/2022    BILATERAL L3, 4, 5 MEDIAL BRANCH RADIOFREQUENCY ABLATION (CPT J791814) performed by Niles Colby MD at [address] [facility] ARTHROSCOPY  3/4/11    left rotator cuff repair     Prior to Admission medications    Medication Sig Start Date End Date Taking?  Authorizing Provider   fluticasone (FLONASE) 50 MCG/ACT nasal spray 1 spray by Each Nostril route daily 11/21/22  Yes Hoa Alva DO   rosuvastatin (CRESTOR) 20 MG tablet Take 1 tablet by mouth daily 10/3/22  Yes Ermelinda Armstrong MD   tamsulosin (FLOMAX) 0.4 MG capsule Take 0.4 mg by mouth daily 4/7/22  Yes Historical Provider, MD   COMBIGAN 0.2-0.5 % ophthalmic solution Place 1 drop into both eyes 2 times daily 4/12/22  Yes Historical Provider, MD   brinzolamide (AZOPT) 1 % ophthalmic suspension Place 1 drop into both eyes nightly 4/22/22  Yes Historical Provider, MD   budesonide-formoterol (SYMBICORT) 160-4.5 MCG/ACT AERO Inhale 2 puffs into the lungs 2 times daily 12/30/21  Yes Hoa Alva DO   albuterol sulfate  (90 Base) MCG/ACT inhaler Inhale 2 puffs into the lungs every 4 hours as needed for Wheezing or Shortness of Breath 12/30/21  Yes Hoa Alva, DO   albuterol (PROVENTIL) (2.5 MG/3ML) 0.083% nebulizer solution Take 3 mLs by nebulization every 4 hours as needed for Wheezing 21  Yes Hoa Alva DO   aspirin 81 MG chewable tablet Take 1 tablet by mouth daily 21  Yes Theodore Anders MD   latanoprost (XALATAN) 0.005 % ophthalmic solution instill 1 drop into both eyes at bedtime 4/15/21  Yes Historical Provider, MD   Arginine 500 MG CAPS Take 2 capsules by mouth 3 times daily    Yes Historical Provider, MD   Omega-3 Fatty Acids (FISH OIL) 1200 MG CAPS Take by mouth 2 times daily   Yes Historical Provider, MD   Multiple Vitamins-Minerals (THERAPEUTIC MULTIVITAMIN-MINERALS) tablet Take 1 tablet by mouth 2 times daily   Yes Historical Provider, MD     No Known Allergies    Social History     Socioeconomic History    Marital status:      Spouse name: Not on file    Number of children: Not on file    Years of education: Not on file    Highest education level: Not on file   Occupational History    Not on file   Tobacco Use    Smoking status: Former     Packs/day: 1.50     Years: 40.00     Pack years: 60.00     Types: Cigarettes     Start date:      Quit date:      Years since quittin.8    Smokeless tobacco: Never   Vaping Use    Vaping Use: Never used   Substance and Sexual Activity    Alcohol use: No     Comment: 6 cups a day of coffee    Drug use: No    Sexual activity: Not on file   Other Topics Concern    Not on file   Social History Narrative    Not on file     Social Determinants of Health     Financial Resource Strain: Low Risk     Difficulty of Paying Living Expenses: Not hard at all   Food Insecurity: No Food Insecurity    Worried About Running Out of Food in the Last Year: Never true    Ran Out of Food in the Last Year: Never true   Transportation Needs: Not on file   Physical Activity: Sufficiently Active    Days of Exercise per Week: 2 days    Minutes of Exercise per Session: 120 min   Stress: Not on file   Social Connections: Not on file Intimate Partner Violence: Not on file   Housing Stability: Not on file     Family History   Problem Relation Age of Onset    No Known Problems Mother     Heart Disease Father     Heart Attack Father     High Blood Pressure Father     High Cholesterol Father     Diabetes Father     Arrhythmia Sister      REVIEW OF SYSTEMS:     Janie Hoover denies fever/chills, chest pain, shortness of breath, new bowel or bladder complaints. All other review of systems was negative. PHYSICAL EXAMINATION:      /76   Pulse 56   Temp 97.7 °F (36.5 °C) (Infrared)   Ht 5' 10\" (1.778 m)   Wt 180 lb (81.6 kg)   SpO2 98%   BMI 25.83 kg/m²     General:       General appearance:   elderly, pleasant and well-hydrated. , in mild to moderate discomfort and A & O x3  Build:Normal Weight     HEENT:     Head:normocephalic and atraumatic  Sclera: icterus absent,      Lungs:     Breathing:Breathing Pattern: regular, no distress     Abdomen:     Shape:non-distended and normal  Tenderness:none     Lumbar spine:     Spine inspection:normal   CVA tenderness:No   Palpation:tenderness paravertebral muscles  Range of motion:not tested. Musculoskeletal:     Trigger points in Paraveteral:absent bilaterally  SI joint tenderness:positive right, negative left  SLR:negative right, negative left, sitting      Extremities:     Tremors:None bilaterally upper and lower  Range of motion:pain with internal rotation of hips negative. Intact:Yes  Edema:Normal     Neurological:     Sensory:normal to light touch bilateral lower extremities. Motor:                             Right Quadriceps5-/5          Left Quadriceps5-/5           Right Gastrocnemius5-/5    Left Gastrocnemius5-/5  Right Ant Tibialis5-/5  Left Ant Tibialis5-/5  Gait:normal     Dermatology:     Skin:no unusual rashes and no skin lesions     Impression:  Low back pain with radiation to the right buttocks.   Lumbar spine MRI 2019 L2-3/L3-4/L4-5 spinal canal stenosis worst at L3-4 with multilevel facet arthritis. Moderate relief with phyiscal therapy. Plan:  Follow up on his low back pain with no acute issues. Patient is s/p bilateral L3,4,5 MB RFA with more than 75% improvement in his axial low back pain. Complaints of increased buttocks pain with standing and walking. Reviewed lumbar spine MRI and discussed pathology of spinal stenosis. Discussed treatment options including lumbar epidural at L3-4 with low volume, patient agrees. Currently with Gabapentin 300 mg BID. OARRS report reviewed 11/2022. Patient encouraged to stay active. Treatment plan discussed with the patient including medications and procedure side effects     We discussed with the patient that combining opioids, benzodiazepines, alcohol, illicit drugs or sleep aids increases the risk of respiratory depression including death. We discussed that these medications may cause drowsiness, sedation or dizziness and have counseled the patient not to drive or operate machinery. We have discussed that these medications will be prescribed only by one provider. We have discussed with the patient about age related risk factors and have thoroughly discussed the importance of taking these medications as prescribed. The patient verbalizes understanding. demi Coats M.D.

## 2022-11-23 ENCOUNTER — OFFICE VISIT (OUTPATIENT)
Dept: ENT CLINIC | Age: 67
End: 2022-11-23
Payer: MEDICARE

## 2022-11-23 ENCOUNTER — TELEPHONE (OUTPATIENT)
Dept: CARDIOLOGY CLINIC | Age: 67
End: 2022-11-23

## 2022-11-23 ENCOUNTER — PROCEDURE VISIT (OUTPATIENT)
Dept: AUDIOLOGY | Age: 67
End: 2022-11-23
Payer: MEDICARE

## 2022-11-23 VITALS
WEIGHT: 180 LBS | DIASTOLIC BLOOD PRESSURE: 81 MMHG | SYSTOLIC BLOOD PRESSURE: 136 MMHG | BODY MASS INDEX: 25.77 KG/M2 | HEIGHT: 70 IN | HEART RATE: 69 BPM

## 2022-11-23 DIAGNOSIS — H93.13 TINNITUS OF BOTH EARS: ICD-10-CM

## 2022-11-23 DIAGNOSIS — H90.3 SENSORINEURAL HEARING LOSS (SNHL) OF BOTH EARS: Primary | ICD-10-CM

## 2022-11-23 DIAGNOSIS — H61.23 BILATERAL IMPACTED CERUMEN: ICD-10-CM

## 2022-11-23 DIAGNOSIS — H61.23 BILATERAL IMPACTED CERUMEN: Primary | ICD-10-CM

## 2022-11-23 PROCEDURE — 92567 TYMPANOMETRY: CPT | Performed by: AUDIOLOGIST

## 2022-11-23 PROCEDURE — 69210 REMOVE IMPACTED EAR WAX UNI: CPT | Performed by: NURSE PRACTITIONER

## 2022-11-23 PROCEDURE — 99203 OFFICE O/P NEW LOW 30 MIN: CPT | Performed by: NURSE PRACTITIONER

## 2022-11-23 PROCEDURE — 1123F ACP DISCUSS/DSCN MKR DOCD: CPT | Performed by: NURSE PRACTITIONER

## 2022-11-23 ASSESSMENT — ENCOUNTER SYMPTOMS
RHINORRHEA: 0
SHORTNESS OF BREATH: 0
STRIDOR: 0
RESPIRATORY NEGATIVE: 1
SINUS PAIN: 0
SINUS PRESSURE: 0
EYES NEGATIVE: 1

## 2022-11-23 NOTE — TELEPHONE ENCOUNTER
[facility] asking for cardiac clearance and permission to hold Aspirin for 7 days for epidural. Patient last seen in office by you on 5/10/2022. Please advise.

## 2022-11-23 NOTE — PROGRESS NOTES
[facility]  Dr. Irwin Tracy. QUENTIN Jeong Ms.Ed. New Consult       Patient Name:  Arsenio Pollock  :  1955     CHIEF C/O:    Chief Complaint   Patient presents with    New Patient     NP hearing loss RT ear/cerumen       HISTORY OBTAINED FROM:  patient    HISTORY OF PRESENT ILLNESS:       Samanta Brown is a 79y.o. year old male, here today for hearing loss and cerumen impactions. Patient states he is noted continued hearing loss for many years but does note history of bilateral cerumen impactions. 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 He denies any history of recurrent ear infections or previous ear surgeries. He does have previously diagnosed hearing loss but denies any hearing aid usage. He does have intermittent tinnitus that is high-frequency and nonpulsatile in both ears but states he does not notice it frequently. He denies family history of hearing loss. Patient does have a significant history of noise exposure through different jobs. Add new weight and is young and yearly he denies any room spinning vertigo symptoms. He denies any ear pain or pressure. Past Medical History:   Diagnosis Date    Arthritis     Asthma     Breathing difficulty     CAD (coronary artery disease)     COPD (chronic obstructive pulmonary disease) (HCC)     Non-rheumatic tricuspid valve insufficiency     Nonrheumatic mitral (valve) insufficiency      Past Surgical History:   Procedure Laterality Date    APPENDECTOMY      COLONOSCOPY      CORONARY ANGIOPLASTY WITH STENT PLACEMENT  2020    DR. Mandel Resolute [place] DUANE 2.25x18 Mid LAD, Resolute [place] DUANE 2.5x18 Mid Cx.   EF 65%    HERNIA REPAIR      INTRACAPSULAR CATARACT EXTRACTION Left 2021    LEFT   CATARACT EXTRACTION  IOL IMPLANT I STENT performed by Isabela Ugarte MD at [address]  Bilateral 2022    BILATERAL L2 L3 L4 MEDIAL BRACH NERVE BLOCK (CPT 85963) performed by Edel Nguyễn MD at [address]  Bilateral 6/13/2022    BILATERAL L3 L4 L5 MEDIAL BRANCH NERVE BLOCK performed by Ortega Sofia MD at [address]      right wrist carpal tunnel release, right long trigger finger release    OTHER SURGICAL HISTORY Right 01/20/2016    right shoulder arthroscopy acromioplasty with decompression rotator cuff repair     OTHER SURGICAL HISTORY Left 04/06/2021    LEFT EYE CATERACT EXTRACTION WITH INTRA OCULAR IMPLANT WITH I STENT    PAIN MANAGEMENT PROCEDURE Bilateral 7/11/2022    BILATERAL L3, 4, 5 MEDIAL BRANCH RADIOFREQUENCY ABLATION (CPT 72207) performed by Ortega Sofia MD at [address] [facility] ARTHROSCOPY  3/4/11    left rotator cuff repair       Current Outpatient Medications:     fluticasone (FLONASE) 50 MCG/ACT nasal spray, 1 spray by Each Nostril route daily, Disp: 16 g, Rfl: 0    rosuvastatin (CRESTOR) 20 MG tablet, Take 1 tablet by mouth daily, Disp: 90 tablet, Rfl: 0    tamsulosin (FLOMAX) 0.4 MG capsule, Take 0.4 mg by mouth daily, Disp: , Rfl:     COMBIGAN 0.2-0.5 % ophthalmic solution, Place 1 drop into both eyes 2 times daily, Disp: , Rfl:     brinzolamide (AZOPT) 1 % ophthalmic suspension, Place 1 drop into both eyes nightly, Disp: , Rfl:     budesonide-formoterol (SYMBICORT) 160-4.5 MCG/ACT AERO, Inhale 2 puffs into the lungs 2 times daily, Disp: 3 each, Rfl: 3    albuterol sulfate  (90 Base) MCG/ACT inhaler, Inhale 2 puffs into the lungs every 4 hours as needed for Wheezing or Shortness of Breath, Disp: 3 each, Rfl: 3    albuterol (PROVENTIL) (2.5 MG/3ML) 0.083% nebulizer solution, Take 3 mLs by nebulization every 4 hours as needed for Wheezing, Disp: 360 each, Rfl: 3    aspirin 81 MG chewable tablet, Take 1 tablet by mouth daily, Disp: 90 tablet, Rfl: 3    latanoprost (XALATAN) 0.005 % ophthalmic solution, instill 1 drop into both eyes at bedtime, Disp: , Rfl:     Arginine 500 MG CAPS, Take 2 capsules by mouth 3 times daily , Disp: , Rfl:     Omega-3 Fatty Acids (FISH OIL) 1200 MG CAPS, Take by mouth 2 times daily, Disp: , Rfl:     Multiple Vitamins-Minerals (THERAPEUTIC MULTIVITAMIN-MINERALS) tablet, Take 1 tablet by mouth 2 times daily, Disp: , Rfl:   Patient has no known allergies. Social History     Tobacco Use    Smoking status: Former     Packs/day: 1.50     Years: 40.00     Pack years: 60.00     Types: Cigarettes     Start date:      Quit date:      Years since quittin.8    Smokeless tobacco: Never   Vaping Use    Vaping Use: Never used   Substance Use Topics    Alcohol use: No     Comment: 6 cups a day of coffee    Drug use: No     Family History   Problem Relation Age of Onset    No Known Problems Mother     Heart Disease Father     Heart Attack Father     High Blood Pressure Father     High Cholesterol Father     Diabetes Father     Arrhythmia Sister        Review of Systems   Constitutional: Negative. Negative for activity change and appetite change. HENT:  Positive for hearing loss and tinnitus. Negative for congestion, ear discharge, ear pain, postnasal drip, rhinorrhea, sinus pressure and sinus pain. Eyes: Negative. Respiratory: Negative. Negative for shortness of breath and stridor. Cardiovascular: Negative. Negative for chest pain and palpitations. Endocrine: Negative. Musculoskeletal: Negative. Skin: Negative. Neurological: Negative. Negative for dizziness. Hematological: Negative. Psychiatric/Behavioral: Negative. /81   Pulse 69   Ht 5' 10\" (1.778 m)   Wt 180 lb (81.6 kg)   BMI 25.83 kg/m²   Physical Exam  Constitutional:       Appearance: Normal appearance. HENT:      Head: Normocephalic. Right Ear: Tympanic membrane, ear canal and external ear normal. Decreased hearing noted. There is impacted cerumen. Left Ear: Tympanic membrane, ear canal and external ear normal. Decreased hearing noted. There is impacted cerumen.       Nose: Nose normal. No rhinorrhea. Right Turbinates: Not pale. Left Turbinates: Not pale. Mouth/Throat:      Lips: Pink. Mouth: Mucous membranes are moist.      Pharynx: Oropharynx is clear. Eyes:      Conjunctiva/sclera: Conjunctivae normal.      Pupils: Pupils are equal, round, and reactive to light. Cardiovascular:      Rate and Rhythm: Normal rate and regular rhythm. Pulses: Normal pulses. Pulmonary:      Effort: Pulmonary effort is normal. No respiratory distress. Breath sounds: No stridor. Musculoskeletal:         General: Normal range of motion. Cervical back: Normal range of motion. No rigidity. No muscular tenderness. Skin:     General: Skin is warm and dry. Neurological:      General: No focal deficit present. Mental Status: He is alert and oriented to person, place, and time. Psychiatric:         Mood and Affect: Mood normal.         Behavior: Behavior normal.         Thought Content: Thought content normal.         Judgment: Judgment normal.                   Audiogram and tympanogram reviewed with patient. Audiogram reveals 25 dB hearing loss in the right ear with 88% discrimination at 60 dB, 35 dB of hearing loss in the left ear with 80% discrimination at 35 dB. Audiogram is asymmetrical on left. Tympanogram reveals type A curve in the right ear, with type A curve in the left ear. IMPRESSION/PLAN:    Cerumen removal     Auditory canal(s) both ears completely obstructed with cerumen. Cerumen was gently removed using soft plastic curette. Tympanic membranes are intact following the procedure. Auditory canals appear normal.        Alphonso Yang was seen today for new patient.     Diagnoses and all orders for this visit:    Bilateral impacted cerumen  -     WI REMOVAL IMPACTED CERUMEN INSTRUMENTATION UNILAT    Sensorineural hearing loss (SNHL) of both ears    Tinnitus of both ears    Patient is seen and examined today for bilateral hearing loss and bilateral cerumen impactions. Upon exam he is found to have bilateral complete obstruction with cerumen that were removed without difficulty. Patient tolerated well. Audiogram from September was reviewed with the patient showing bilateral sensorineural hearing loss with mild asymmetry of the left ear. Given patient's significant history of noise exposure with consistent pattern of his hearing loss he will be cleared for hearing aids at this time without further testing. He will contact his insurance company to ensure he has hearing aid coverage and obtain a list of qualified providers for hearing aids. At this time he will begin using Debrox drops, 5 drops to both ears once weekly followed by warm water flush for maintenance of his cerumen. He will follow-up in 6 months for repeat cleaning. He is instructed to call with any new or worsening symptoms prior to his next appointment.     RUDDY Degroot, FNP-C  [address] [place] [address], Nose and Throat    The information contained in this note has been dictated using drug and medical speech recognition software and may contain errors

## 2022-11-23 NOTE — PROGRESS NOTES
This patient was referred for tympanometric testing by ALLEGRA Carrera due to cerumen. He had hearing test completed at [facility] in Sept.     Post cerumen removal, tympanometry revealed normal middle ear peak pressure and compliance, bilaterally. The results were reviewed with the patient. Amplification was discussed. He will check his insurance company benefits and call  if can use [facility]) audiology for hearing aids. Recommendations for follow up will be made pending physician consult.     Electronically signed by Marisa Del Valle on 11/23/2022 at 9:05 AM

## 2022-11-28 NOTE — TELEPHONE ENCOUNTER
Patient may hold his aspirin 7 to 10 days prior to the procedure to be resumed postprocedure when deemed safe from pain management standpoint

## 2022-11-29 ENCOUNTER — OFFICE VISIT (OUTPATIENT)
Dept: PRIMARY CARE CLINIC | Age: 67
End: 2022-11-29
Payer: MEDICARE

## 2022-11-29 VITALS
OXYGEN SATURATION: 97 % | SYSTOLIC BLOOD PRESSURE: 128 MMHG | DIASTOLIC BLOOD PRESSURE: 80 MMHG | HEART RATE: 58 BPM | BODY MASS INDEX: 26.3 KG/M2 | HEIGHT: 70 IN | TEMPERATURE: 97 F | WEIGHT: 183.7 LBS

## 2022-11-29 DIAGNOSIS — J43.9 PULMONARY EMPHYSEMA, UNSPECIFIED EMPHYSEMA TYPE (HCC): ICD-10-CM

## 2022-11-29 DIAGNOSIS — I25.10 CAD IN NATIVE ARTERY: ICD-10-CM

## 2022-11-29 DIAGNOSIS — M48.062 SPINAL STENOSIS OF LUMBAR REGION WITH NEUROGENIC CLAUDICATION: ICD-10-CM

## 2022-11-29 DIAGNOSIS — E78.2 MIXED HYPERLIPIDEMIA: Primary | ICD-10-CM

## 2022-11-29 DIAGNOSIS — I10 ESSENTIAL HYPERTENSION: ICD-10-CM

## 2022-11-29 DIAGNOSIS — Z13.6 SCREENING FOR AAA (ABDOMINAL AORTIC ANEURYSM): ICD-10-CM

## 2022-11-29 PROCEDURE — 3074F SYST BP LT 130 MM HG: CPT | Performed by: INTERNAL MEDICINE

## 2022-11-29 PROCEDURE — 3078F DIAST BP <80 MM HG: CPT | Performed by: INTERNAL MEDICINE

## 2022-11-29 PROCEDURE — 1123F ACP DISCUSS/DSCN MKR DOCD: CPT | Performed by: INTERNAL MEDICINE

## 2022-11-29 PROCEDURE — 99213 OFFICE O/P EST LOW 20 MIN: CPT | Performed by: INTERNAL MEDICINE

## 2022-11-30 NOTE — PROGRESS NOTES
Chief Complaint   Patient presents with    Follow-up     From October 2022  no labs or tests since last visit. 1.  States he will have injections in his back Dec 19th. He is concerned because after last injection had difficulty turning his neck. Other     Care gaps-  due for AAA screen. States he has not made decision for [place]. HPI:  Patient is here for follow-up   Patient scheduled for epidural injection for low back pain. By pain management  Patient had a low-dose CT of the lung screening showed:  1. There is no pulmonary infiltrate, mass or suspicious pulmonary nodule. 2. Benign calcified granulomatous disease   Denied cardiopulmonary symptoms he has quit cigarette smoking ,compliant on medications  Discussed with the patient ultrasound of the abdomen screening for AAA aneurysm he verbalized understanding  Ultrasound was ordered    Past Medical History, Surgical History, and Family History has been reviewed and updated.     Review of Systems:  Constitutional:  No fever, no fatigue, no chills, no headaches, no weight change  Dermatology:  No rash, no mole, no dry or sensitive skin  ENT:  No cough, no sore throat, no sinus pain, no runny nose, no ear pain  Cardiology:  No chest pain, no palpitations, no leg edema, no shortness of breath, no PND  Gastroenterology:  No dysphagia, no abdominal pain, no nausea, no vomiting, no constipation, no diarrhea, no heartburn  Musculoskeletal:  No joint pain, no leg cramps, no back pain, no muscle aches  Respiratory:  No shortness of breath, no orthopnea, no wheezing, no OJEDA, no hemoptysis  Urology:  No blood in the urine, no urinary frequency, no urinary incontinence, no urinary urgency, no nocturia, no dysuria    Vitals:    11/29/22 1225   BP: 128/80   Site: Right Upper Arm   Position: Sitting   Cuff Size: Large Adult   Pulse: 58   Temp: 97 °F (36.1 °C)   SpO2: 97%   Weight: 183 lb 11.2 oz (83.3 kg)   Height: 5' 10\" (1.778 m) General:  Patient alert and oriented x 3, NAD, pleasant  HEENT:  Atraumatic, normocephalic, PERRLA, EOMI, clear conjunctiva, TMs clear, nose-clear, throat - no erythema  Neck:  Supple, no goiter, no carotid bruits, no LAD  Lungs:  CTA   Heart:  RRR, no murmurs, gallops or rubs  Abdomen:  Soft/nt/nd, + bowel sounds  Lymph node examination: unremarkable  Neurological exam : unremarkable  Extremities:  No clubbing, cyanosis or edema  Skin: unremarkable    Hemoglobin A1C   Date Value Ref Range Status   12/23/2020 5.4 4.0 - 5.6 % Final     Cholesterol, Total   Date Value Ref Range Status   06/22/2022 125 0 - 199 mg/dL Final     Triglycerides   Date Value Ref Range Status   06/22/2022 77 0 - 149 mg/dL Final     HDL   Date Value Ref Range Status   06/22/2022 46 >40 mg/dL Final     LDL Calculated   Date Value Ref Range Status   06/22/2022 64 0 - 99 mg/dL Final     VLDL Cholesterol Calculated   Date Value Ref Range Status   06/22/2022 15 mg/dL Final     Sodium   Date Value Ref Range Status   06/22/2022 142 132 - 146 mmol/L Final     Potassium   Date Value Ref Range Status   06/22/2022 3.6 3.5 - 5.0 mmol/L Final     Chloride   Date Value Ref Range Status   06/22/2022 105 98 - 107 mmol/L Final     CO2   Date Value Ref Range Status   06/22/2022 21 (L) 22 - 29 mmol/L Final     BUN   Date Value Ref Range Status   06/22/2022 12 6 - 23 mg/dL Final     Creatinine   Date Value Ref Range Status   06/22/2022 0.8 0.7 - 1.2 mg/dL Final     Glucose   Date Value Ref Range Status   06/22/2022 102 (H) 74 - 99 mg/dL Final     Calcium   Date Value Ref Range Status   06/22/2022 9.0 8.6 - 10.2 mg/dL Final     Total Protein   Date Value Ref Range Status   06/22/2022 7.1 6.4 - 8.3 g/dL Final     Albumin   Date Value Ref Range Status   06/22/2022 4.3 3.5 - 5.2 g/dL Final     Total Bilirubin   Date Value Ref Range Status   06/22/2022 0.7 0.0 - 1.2 mg/dL Final     Alkaline Phosphatase   Date Value Ref Range Status   06/22/2022 53 40 - 129 U/L Final     AST   Date Value Ref Range Status   06/22/2022 27 0 - 39 U/L Final     ALT   Date Value Ref Range Status   06/22/2022 30 0 - 40 U/L Final     GFR Non-   Date Value Ref Range Status   06/22/2022 >60 >=60 mL/min/1.73 Final     Comment:     Chronic Kidney Disease: less than 60 ml/min/1.73 sq.m. Kidney Failure: less than 15 ml/min/1.73 sq.m. Results valid for patients 18 years and older. GFR    Date Value Ref Range Status   06/22/2022 >60  Final        No results found. Assessment/Plan:   Coronary artery disease stable  Hyperlipidemia  COPD stable  Health maintenance: Screening CT scan of the lung reviewed.   Ultrasound of the abdomen for AAA screening ordered  Lumbar spinal stenosis multilevel worse at L3-L4    Outpatient Encounter Medications as of 11/29/2022   Medication Sig Dispense Refill    fluticasone (FLONASE) 50 MCG/ACT nasal spray 1 spray by Each Nostril route daily 16 g 0    rosuvastatin (CRESTOR) 20 MG tablet Take 1 tablet by mouth daily 90 tablet 0    COMBIGAN 0.2-0.5 % ophthalmic solution Place 1 drop into both eyes 2 times daily      brinzolamide (AZOPT) 1 % ophthalmic suspension Place 1 drop into both eyes nightly      budesonide-formoterol (SYMBICORT) 160-4.5 MCG/ACT AERO Inhale 2 puffs into the lungs 2 times daily 3 each 3    albuterol sulfate  (90 Base) MCG/ACT inhaler Inhale 2 puffs into the lungs every 4 hours as needed for Wheezing or Shortness of Breath 3 each 3    albuterol (PROVENTIL) (2.5 MG/3ML) 0.083% nebulizer solution Take 3 mLs by nebulization every 4 hours as needed for Wheezing 360 each 3    aspirin 81 MG chewable tablet Take 1 tablet by mouth daily 90 tablet 3    latanoprost (XALATAN) 0.005 % ophthalmic solution instill 1 drop into both eyes at bedtime      Arginine 500 MG CAPS Take 2 capsules by mouth 3 times daily       Omega-3 Fatty Acids (FISH OIL) 1200 MG CAPS Take by mouth 2 times daily      Multiple Vitamins-Minerals (THERAPEUTIC MULTIVITAMIN-MINERALS) tablet Take 1 tablet by mouth 2 times daily      [DISCONTINUED] tamsulosin (FLOMAX) 0.4 MG capsule Take 0.4 mg by mouth daily       No facility-administered encounter medications on file as of 11/29/2022. Parveen Hartman was seen today for follow-up and other. Diagnoses and all orders for this visit:    Mixed hyperlipidemia  -     LIPID PANEL; Future    Screening for AAA (abdominal aortic aneurysm)  -     US SCREENING FOR AAA; Future    Essential hypertension  -     Comprehensive Metabolic Panel; Future    CAD in native artery    Spinal stenosis of lumbar region with neurogenic claudication    Pulmonary emphysema, unspecified emphysema type ([facility] [address]       There are no Patient Instructions on file for this visit. On this date 11/29/2022 I have spent 25 minutes reviewing previous notes, test results and face to face with the patient discussing the diagnosis and importance of compliance with the treatment plan as well as documenting on the day of the visit.       Rigo Parra MD   11/29/22

## 2022-12-05 ENCOUNTER — TELEPHONE (OUTPATIENT)
Dept: CARDIOLOGY CLINIC | Age: 67
End: 2022-12-05

## 2022-12-05 NOTE — TELEPHONE ENCOUNTER
Pain management is requesting for medical clearance. Pain management wants to hold aspirin for 5-7 days and for a lumbar epidural steroid injection on 12/19/2022.     Please Advise

## 2022-12-06 NOTE — TELEPHONE ENCOUNTER
Patient is at acceptable risk for perioperative cardiovascular event for the planned procedure (lumbar epidural steroid injection), patient may proceed without any further cardiac testing.   May hold aspirin 7 to 10 days prior to the procedure to be resumed postprocedure when deemed safe from pain management standpoint  Please feel free to call for any further information

## 2022-12-09 ENCOUNTER — TELEPHONE (OUTPATIENT)
Dept: PAIN MANAGEMENT | Age: 67
End: 2022-12-09

## 2022-12-09 PROBLEM — M54.16 LUMBAR RADICULOPATHY: Status: ACTIVE | Noted: 2022-12-09

## 2022-12-09 NOTE — TELEPHONE ENCOUNTER
Call to Ascencion Avila that procedure was approved for 12/19/2022 and that [facility] should call him a few days before for the pre op call and after 3:00 PM the business day before with the arrival time. Instructed Dinorah Dior to hold ibuprofen for 24 hours, naprosyn for 4 days and any aspirin containing products or fish oil for 7 days. Instructed to call office back if any questions. Dinorah Dior verbalized understanding.     Electronically signed by Hernán Vreas RN on 12/9/2022 at 11:58 AM

## 2022-12-19 ENCOUNTER — HOSPITAL ENCOUNTER (OUTPATIENT)
Age: 67
Setting detail: OUTPATIENT SURGERY
Discharge: HOME | End: 2022-12-19
Attending: PAIN MEDICINE | Admitting: PAIN MEDICINE
Payer: MEDICARE

## 2022-12-19 ENCOUNTER — HOSPITAL ENCOUNTER (OUTPATIENT)
Dept: OPERATING ROOM | Age: 67
Setting detail: OUTPATIENT SURGERY
Discharge: HOME | End: 2022-12-19
Attending: PAIN MEDICINE
Payer: MEDICARE

## 2022-12-19 VITALS
WEIGHT: 183 LBS | TEMPERATURE: 97.7 F | SYSTOLIC BLOOD PRESSURE: 134 MMHG | HEIGHT: 70 IN | OXYGEN SATURATION: 96 % | DIASTOLIC BLOOD PRESSURE: 69 MMHG | RESPIRATION RATE: 16 BRPM | HEART RATE: 54 BPM | BODY MASS INDEX: 26.2 KG/M2

## 2022-12-19 DIAGNOSIS — M54.16 LUMBAR RADICULOPATHY: ICD-10-CM

## 2022-12-19 PROCEDURE — 3600000005 HC SURGERY LEVEL 5 BASE: Performed by: PAIN MEDICINE

## 2022-12-19 PROCEDURE — 2500000003 HC RX 250 WO HCPCS: Performed by: PAIN MEDICINE

## 2022-12-19 PROCEDURE — 6360000002 HC RX W HCPCS: Performed by: PAIN MEDICINE

## 2022-12-19 PROCEDURE — 2709999900 HC NON-CHARGEABLE SUPPLY: Performed by: PAIN MEDICINE

## 2022-12-19 PROCEDURE — 7100000010 HC PHASE II RECOVERY - FIRST 15 MIN: Performed by: PAIN MEDICINE

## 2022-12-19 PROCEDURE — 3209999900 FLUORO FOR SURGICAL PROCEDURES

## 2022-12-19 PROCEDURE — 7100000011 HC PHASE II RECOVERY - ADDTL 15 MIN: Performed by: PAIN MEDICINE

## 2022-12-19 RX ORDER — SODIUM CHLORIDE 9 MG/ML
INJECTION, SOLUTION INTRAVENOUS PRN
Status: DISCONTINUED | OUTPATIENT
Start: 2022-12-19 | End: 2022-12-19 | Stop reason: HOSPADM

## 2022-12-19 RX ORDER — SODIUM CHLORIDE 0.9 % (FLUSH) 0.9 %
5-40 SYRINGE (ML) INJECTION PRN
Status: DISCONTINUED | OUTPATIENT
Start: 2022-12-19 | End: 2022-12-19 | Stop reason: HOSPADM

## 2022-12-19 RX ORDER — SODIUM CHLORIDE 0.9 % (FLUSH) 0.9 %
5-40 SYRINGE (ML) INJECTION EVERY 12 HOURS SCHEDULED
Status: DISCONTINUED | OUTPATIENT
Start: 2022-12-19 | End: 2022-12-19 | Stop reason: HOSPADM

## 2022-12-19 RX ORDER — LIDOCAINE HYDROCHLORIDE 5 MG/ML
INJECTION, SOLUTION INFILTRATION; INTRAVENOUS PRN
Status: DISCONTINUED | OUTPATIENT
Start: 2022-12-19 | End: 2022-12-19 | Stop reason: ALTCHOICE

## 2022-12-19 ASSESSMENT — PAIN SCALES - GENERAL
PAINLEVEL_OUTOF10: 0
PAINLEVEL_OUTOF10: 0

## 2022-12-19 ASSESSMENT — PAIN - FUNCTIONAL ASSESSMENT: PAIN_FUNCTIONAL_ASSESSMENT: 0-10

## 2022-12-19 NOTE — DISCHARGE INSTRUCTIONS
[facility]) Pain Management Department  [phone]   Post-Pain Block/ Radiofrequency Home Going Instructions    1-Go home, rest for the remainder of the day    2-Please do not lift over 20 pounds the day of the injection    3-If you received sedation No: alcohol, driving, operating lawn mowers, plows, tractors or other dangerous equipment until next morning. Do not make important decisions or sign legal documents for 24 hours. You may experience light headedness, dizziness, nausea or sleepiness after sedation. Do not stay alone. A responsible adult must be with you for 24 hours. You could be nauseated from the medications you have received. Your IV site may be sore and bruised. 4-No dietary restrictions     5-Resume all medications the same day, blood thinners to be resumed 24 hours after injection    6-Keep the surgical site clean and dry, you may shower the next morning and remove the      dressing. 7- No sitz baths, tub baths or hot tubs/swimming for 24 hours. 8- If you have any pain at the injection site(s), application of an ice pack to the area should be       helpful, 20 minutes on/20 minutes off for next 48 hours. 9- [facility] at if you develop. Fever greater than 100.4 F  Have bleeding or drainage from the puncture site  Have progressive Leg/arm numbness and or weakness  Loss of control of bowel and or bladder (wet/soil yourself)  Severe headache with inability to lift head    10-You may return to work the next day            Infection After Surgery: Care Instructions  Overview  After surgery, an infection is always possible. It doesn't mean that the surgery didn't go well. Because an infection can be serious, your doctor has taken steps to manage it. Your doctor checked the infection and cleaned it if necessary. Your doctor may have made an opening in the area so that the pus can drain out.  You may have gauze in the cut so that the area will stay open and keep draining. You may need antibiotics. You will need to follow up with your doctor to make sure the infection has gone away. Follow-up care is a key part of your treatment and safety. Be sure to make and go to all appointments, and call your doctor if you are having problems. It's also a good idea to know your test results and keep a list of the medicines you take. How can you care for yourself at home? Make sure your surgeon knows about the infection, especially if you saw another doctor about your symptoms. If your doctor prescribed antibiotics, take them as directed. Do not stop taking them just because you feel better. You need to take the full course of antibiotics. Ask your doctor if you can take an over-the-counter pain medicine, such as acetaminophen (Tylenol), ibuprofen (Advil, Motrin), or naproxen (Aleve). Be safe with medicines. Read and follow all instructions on the label. Do not take two or more pain medicines at the same time unless the doctor told you to. Many pain medicines have acetaminophen, which is Tylenol. Too much acetaminophen (Tylenol) can be harmful. Prop up the area on a pillow anytime you sit or lie down during the next 3 days. Try to keep it above the level of your heart. This will help reduce swelling. Keep the skin clean and dry. You may have a bandage over the cut (incision). A bandage helps the incision heal and protects it. Your doctor will tell you how to take care of this. Keep it clean and dry. You may have drainage from the wound. If your doctor told you how to care for your incision, follow your doctor's instructions. If you did not get instructions, follow this general advice:  Wash around the incision with clean water 2 times a day. Don't use hydrogen peroxide or alcohol, which can slow healing. When should you call for help? Call your doctor now or seek immediate medical care if:    You have signs that your infection is getting worse, such as:   Increased pain, swelling, warmth, or redness in the area. Red streaks leading from the area. Pus draining from the wound. A new or higher fever. Watch closely for changes in your health, and be sure to contact your doctor if you have any problems. Where can you learn more? Go to http://www.woods.com/ and enter C340 to learn more about \"Infection After Surgery: Care Instructions. \"  Current as of: January 20, 2022               Content Version: 13.5  © 2006-2022 [employer]. Care instructions adapted under license by [facility] ([facility]). If you have questions about a medical condition or this instruction, always ask your healthcare professional. [place] [address] any warranty or liability for your use of this information.

## 2022-12-19 NOTE — LETTER
[address] [facility] [address]      December 19, 2022     Patient: Marcia Preciado   YOB: 1955   Date of Visit: 12/9/2022       To Whom it May Concern:    Cee Calvillo was seen at the [facility] 12/19/22. Please excuse him from work today. If you have any questions or concerns, please don't hesitate to call.     Sincerely,       Anthony Mcpherson RN

## 2022-12-19 NOTE — OP NOTE
2022    Patient: Wilson Claros  :  1955  Age:  79 y.o. Sex:  male     PRE-OPERATIVE DIAGNOSIS: Lumbar disc displacement, lumbar radiculopathy. POST-OPERATIVE DIAGNOSIS: Same. PROCEDURE: Fluoroscopic guided therapeutic lumbar epidural steroid injection at the L3-4 level (# 1). SURGEON: MARIELLA Stewart M.D.. ANESTHESIA: Local    ESTIMATED BLOOD LOSS: None.  ______________________________________________________________________    BRIEF HISTORY:  Wilson Claros comes in today for first lumbar epidural injection at L3-4 level. The potential complications of this procedure were discussed with him again today. He has elected to undergo the aforementioned procedure. Wilbers complete History & Physical examination were reviewed in depth, a copy of which is in the chart. DESCRIPTION OF PROCEDURE:    After confirming written and informed consent, a time-out was performed and Bean Bellamy name and date of birth, the procedure to be performed as well as the plan for the location of the needle insertion were confirmed. The patient was brought into the procedure room and placed in the prone position on the fluoroscopy table. A pillow was placed under the patient's lower abdomen/upper pelvis to increase lumbar interlaminar space. Standard monitors were placed, and vital signs were observed throughout the procedure. The area of the lumbar spine was prepped with chloraprep and draped in a sterile manner. The L3-4 interspace was identified and marked under AP fluoroscopy. The skin and subcutaneous tissues at the above level were anesthestized with 0.5% lidocaine. With intermittent fluoroscopy, an # 18 gauge 3 1/2 tuohy epidural needle was inserted and directed toward the interlaminar space. The needle was slowly advanced using loss of resistance technique and 5 cc glass syringe  until the tip of the epidural needle has passed through the ligamentum flavum and entered the epidural space.  AP and lateral fluoroscopic imaging is performed to verify that the epidural needle is properly placed. Negative aspiration of blood and CSF was confirmed. 0.5 ml of omnipaque 240 was used for confirmation of even epidural spread under both live and AP fluoroscopy. After negative aspiration, a solution of 0.5 % Lidocaine 1 ml and 80 mg DepoMedrol was easily injected. The needle was gently removed intact . The patient back was cleaned and a Band-Aid was placed over the needle insertion point. Disposition the patient tolerated the procedure well and there were no complications . Vital signs remained stable throughout the procedure. The patient was escorted to the recovery area where they remained until discharge and written discharge instructions for the procedure were given. Plan: Jacky Gonzales will return to our pain management center as scheduled.      Melonie Nova MD

## 2022-12-19 NOTE — H&P
[address]  [address] [facility], [address] [place] [address]  [phone]    Procedure History & Physical      [facility]     HPI:    Patient  is here for low back and leg pain for LESI L3-4  Labs/imaging studies reviewed   All question and concerns addressed including R/B/A associated with the procedure    Past Medical History:   Diagnosis Date    Arthritis     Asthma     Breathing difficulty     CAD (coronary artery disease)     COPD (chronic obstructive pulmonary disease) ([address]     Non-rheumatic tricuspid valve insufficiency     Nonrheumatic mitral (valve) insufficiency        Past Surgical History:   Procedure Laterality Date    APPENDECTOMY      COLONOSCOPY      CORONARY ANGIOPLASTY WITH STENT PLACEMENT  12/22/2020    DR. Mandel Resolute Christopher DUANE 2.25x18 Mid LAD, Resolute Christopher DUANE 2.5x18 Mid Cx.   EF 65%    HERNIA REPAIR      INTRACAPSULAR CATARACT EXTRACTION Left 4/6/2021    LEFT   CATARACT EXTRACTION  IOL IMPLANT I STENT performed by Jarod Garcia MD at [address] Bilateral 5/2/2022    BILATERAL L2 L3 L4 MEDIAL BRACH NERVE BLOCK (CPT 71454) performed by Nayana Horan MD at [address] [facility]  Bilateral 6/13/2022    BILATERAL L3 L4 L5 MEDIAL BRANCH NERVE BLOCK performed by Nayana Horan MD at [address]      right wrist carpal tunnel release, right long trigger finger release    OTHER SURGICAL HISTORY Right 01/20/2016    right shoulder arthroscopy acromioplasty with decompression rotator cuff repair     OTHER SURGICAL HISTORY Left 04/06/2021    LEFT EYE CATERACT EXTRACTION WITH INTRA OCULAR IMPLANT WITH I STENT    PAIN MANAGEMENT PROCEDURE Bilateral 7/11/2022    BILATERAL L3, 4, 5 MEDIAL BRANCH RADIOFREQUENCY ABLATION (CPT I609218) performed by Nayana Horan MD at [address] [facility] ARTHROSCOPY  3/4/11    left rotator cuff repair       Prior to Admission medications    Medication Sig Start Date End Date Taking?  Authorizing Provider   fluticasone (FLONASE) 50 MCG/ACT nasal spray 1 spray by Each Nostril route daily 22   Bran Alva DO   rosuvastatin (CRESTOR) 20 MG tablet Take 1 tablet by mouth daily 10/3/22   Ermelinda Armstrong MD   COMBIGAN 0.2-0.5 % ophthalmic solution Place 1 drop into both eyes 2 times daily 22   Historical Provider, MD   brinzolamide (AZOPT) 1 % ophthalmic suspension Place 1 drop into both eyes nightly 22   Historical Provider, MD   budesonide-formoterol (SYMBICORT) 160-4.5 MCG/ACT AERO Inhale 2 puffs into the lungs 2 times daily 21   Hoa Alva DO   albuterol sulfate  (90 Base) MCG/ACT inhaler Inhale 2 puffs into the lungs every 4 hours as needed for Wheezing or Shortness of Breath 21   Bran Alva DO   albuterol (PROVENTIL) (2.5 MG/3ML) 0.083% nebulizer solution Take 3 mLs by nebulization every 4 hours as needed for Wheezing 21   Bran Alva DO   aspirin 81 MG chewable tablet Take 1 tablet by mouth daily 21   Oli Gutierrez MD   latanoprost (XALATAN) 0.005 % ophthalmic solution instill 1 drop into both eyes at bedtime 4/15/21   Historical Provider, MD   Arginine 500 MG CAPS Take 2 capsules by mouth 3 times daily     Historical Provider, MD   Omega-3 Fatty Acids (FISH OIL) 1200 MG CAPS Take by mouth 2 times daily    Historical Provider, MD   Multiple Vitamins-Minerals (THERAPEUTIC MULTIVITAMIN-MINERALS) tablet Take 1 tablet by mouth 2 times daily    Historical Provider, MD       No Known Allergies    Social History     Socioeconomic History    Marital status:      Spouse name: Not on file    Number of children: Not on file    Years of education: Not on file    Highest education level: Not on file   Occupational History    Not on file   Tobacco Use    Smoking status: Former     Packs/day: 1.50     Years: 40.00     Pack years: 60.00     Types: Cigarettes     Start date: 56     Quit date:      Years since quittin.9 Smokeless tobacco: Never   Vaping Use    Vaping Use: Never used   Substance and Sexual Activity    Alcohol use: No     Comment: 6 cups a day of coffee    Drug use: No    Sexual activity: Not on file   Other Topics Concern    Not on file   Social History Narrative    Not on file     Social Determinants of Health     Financial Resource Strain: Low Risk     Difficulty of Paying Living Expenses: Not hard at all   Food Insecurity: No Food Insecurity    Worried About Running Out of Food in the Last Year: Never true    Ran Out of Food in the Last Year: Never true   Transportation Needs: Not on file   Physical Activity: Sufficiently Active    Days of Exercise per Week: 2 days    Minutes of Exercise per Session: 120 min   Stress: Not on file   Social Connections: Not on file   Intimate Partner Violence: Not on file   Housing Stability: Not on file       Family History   Problem Relation Age of Onset    No Known Problems Mother     Heart Disease Father     Heart Attack Father     High Blood Pressure Father     High Cholesterol Father     Diabetes Father     Arrhythmia Sister          REVIEW OF SYSTEMS:    CONSTITUTIONAL:  negative for  fevers, chills, sweats and fatigue    RESPIRATORY:  negative for  dry cough, cough with sputum, dyspnea, wheezing and chest pain    CARDIOVASCULAR:  negative for chest pain, dyspnea, palpitations, syncope    GASTROINTESTINAL:  negative for nausea, vomiting, change in bowel habits, diarrhea, constipation and abdominal pain    MUSCULOSKELETAL: negative for muscle weakness    SKIN: negative for itching or rashes.     BEHAVIOR/PSYCH:  negative for poor appetite, increased appetite, decreased sleep and poor concentration    All other systems negative      PHYSICAL EXAM:    VITALS:  /83   Pulse 62   Temp 97.7 °F (36.5 °C) (Temporal)   Resp 16   Ht 5' 10\" (1.778 m)   Wt 183 lb (83 kg)   SpO2 97%   BMI 26.26 kg/m²     CONSTITUTIONAL:  awake, alert, cooperative, no apparent distress, and

## 2023-01-05 ENCOUNTER — OFFICE VISIT (OUTPATIENT)
Dept: PAIN MANAGEMENT | Age: 68
End: 2023-01-05
Payer: MEDICARE

## 2023-01-05 VITALS
HEART RATE: 66 BPM | WEIGHT: 183 LBS | BODY MASS INDEX: 26.2 KG/M2 | TEMPERATURE: 97.7 F | DIASTOLIC BLOOD PRESSURE: 72 MMHG | OXYGEN SATURATION: 96 % | HEIGHT: 70 IN | SYSTOLIC BLOOD PRESSURE: 132 MMHG

## 2023-01-05 DIAGNOSIS — M51.9 LUMBAR DISC DISORDER: ICD-10-CM

## 2023-01-05 DIAGNOSIS — G89.4 CHRONIC PAIN SYNDROME: Primary | ICD-10-CM

## 2023-01-05 DIAGNOSIS — M47.816 LUMBAR SPONDYLOSIS: ICD-10-CM

## 2023-01-05 DIAGNOSIS — M48.062 SPINAL STENOSIS OF LUMBAR REGION WITH NEUROGENIC CLAUDICATION: ICD-10-CM

## 2023-01-05 DIAGNOSIS — M47.816 LUMBAR FACET ARTHROPATHY: ICD-10-CM

## 2023-01-05 PROCEDURE — 1123F ACP DISCUSS/DSCN MKR DOCD: CPT | Performed by: PAIN MEDICINE

## 2023-01-05 PROCEDURE — 99213 OFFICE O/P EST LOW 20 MIN: CPT | Performed by: PAIN MEDICINE

## 2023-01-05 NOTE — PROGRESS NOTES
Do you currently have any of the following:    Fever: No  Headache:  No  Cough: No  Shortness of breath: No  Exposed to anyone with these symptoms: No                                                                                                                Fariha Socks presents to the [facility] on 1/5/2023. José Miguel Scanlon is complaining of pain IN BACK. The pain is constant. The pain is described as aching. Pain is rated on his best day at a 4, on his worst day at a 4, and on average at a 4 on the VAS scale. He took his last dose of Motrin a few weeks ago. José Miguel Scanlon does not have issues with constipation. Any procedures since your last visit: Yes, with 50 % relief. He is  on NSAIDS and  is  on anticoagulation medications to include ASA and is managed by Dr. Maik Velasquez. Pacemaker or defibrillator: No.    Medication Contract and Consent for Opioid Use Documents Filed        No documents found                       There were no vitals taken for this visit. No LMP for male patient.

## 2023-01-05 NOTE — PROGRESS NOTES
[address] [facility], [address] [place] [address] [place]  [phone]    Follow up Note      Ana Reusing     Date of Visit:  1/5/2023    CC:  Patient presents for follow up   Chief Complaint   Patient presents with    Follow Up After Procedure     Fluoroscopic guided therapeutic lumbar epidural steroid injection at the L3-4 level (# 1). HPI:  Low back pain is better. Appropriate analgesia with current medications regimen: N/A. Change in quality of symptoms:yes. .    Medication side effects:not applicable . Recent diagnostic testing:none. Recent interventional procedures: LESI L3-4 with more than 60% improvement in his pain. He has been on anticoagulation medications to include ASA. The patient  has not been on herbal supplements. The patient is not diabetic. Imaging:   Lumbar spine MRI   1. Significant multilevel spinal stenosis most severe L3-4.   2. Soft tissue structure along the right posterior margin of the L3   vertebra could be disc fragment or relatively less likely mass. Patient will be recalled for postcontrast imaging through this region   and an addendum issued. Previous treatments: Physical Therapy and medications. .          Potential Aberrant Drug-Related Behavior:    No    Urine Drug Screening:  No    OARRS report:  01/2023 consistent     Opioid Agreement:  Renewal date:N/A    Past Medical History:   Diagnosis Date    Arthritis     Asthma     Breathing difficulty     CAD (coronary artery disease)     COPD (chronic obstructive pulmonary disease) (HCC)     Non-rheumatic tricuspid valve insufficiency     Nonrheumatic mitral (valve) insufficiency      Past Surgical History:   Procedure Laterality Date    APPENDECTOMY      COLONOSCOPY      CORONARY ANGIOPLASTY WITH STENT PLACEMENT  12/22/2020    DR. Mandel Resolute Ramah DUANE 2.25x18 Mid LAD, Resolute Christopher DUANE 2.5x18 Mid Cx.   EF 65%    HERNIA REPAIR      INTRACAPSULAR CATARACT EXTRACTION Left 4/6/2021    LEFT   CATARACT EXTRACTION  IOL IMPLANT I STENT performed by Thierry Ag MD at [address] [facility]  Bilateral 5/2/2022    BILATERAL L2 L3 L4 MEDIAL BRACH NERVE BLOCK (CPT 36662) performed by Jair San MD at [address] [facility]  Bilateral 6/13/2022    BILATERAL L3 L4 L5 MEDIAL BRANCH NERVE BLOCK performed by Jair San MD at [address] [facility] 24      right wrist carpal tunnel release, right long trigger finger release    OTHER SURGICAL HISTORY Right 01/20/2016    right shoulder arthroscopy acromioplasty with decompression rotator cuff repair     OTHER SURGICAL HISTORY Left 04/06/2021    LEFT EYE CATERACT EXTRACTION WITH INTRA OCULAR IMPLANT WITH I STENT    PAIN MANAGEMENT PROCEDURE Bilateral 7/11/2022    BILATERAL L3, 4, 5 MEDIAL BRANCH RADIOFREQUENCY ABLATION (CPT X157928) performed by Jair San MD at [address] [facility] N/A 12/19/2022    LUMBAR EPIDURAL STEROID INJECTION L3-4 WITH LOW VOLUME performed by Jair San MD at [address] [facility] ARTHROSCOPY  3/4/11    left rotator cuff repair     Prior to Admission medications    Medication Sig Start Date End Date Taking?  Authorizing Provider   fluticasone (FLONASE) 50 MCG/ACT nasal spray 1 spray by Each Nostril route daily 11/21/22  Yes Hoa Alva DO   rosuvastatin (CRESTOR) 20 MG tablet Take 1 tablet by mouth daily 10/3/22  Yes Ermelinda Armstrong MD   COMBIGAN 0.2-0.5 % ophthalmic solution Place 1 drop into both eyes 2 times daily 4/12/22  Yes Historical Provider, MD   brinzolamide (AZOPT) 1 % ophthalmic suspension Place 1 drop into both eyes nightly 4/22/22  Yes Historical Provider, MD   budesonide-formoterol (SYMBICORT) 160-4.5 MCG/ACT AERO Inhale 2 puffs into the lungs 2 times daily 12/30/21  Yes Hoa Alva DO   albuterol sulfate  (90 Base) MCG/ACT inhaler Inhale 2 puffs into the lungs every 4 hours as needed for Wheezing or Shortness of Breath 21  Yes Hoa Alva DO   albuterol (PROVENTIL) (2.5 MG/3ML) 0.083% nebulizer solution Take 3 mLs by nebulization every 4 hours as needed for Wheezing 21  Yes Hoa Alva DO   aspirin 81 MG chewable tablet Take 1 tablet by mouth daily 21  Yes Truman Aguilar MD   latanoprost (XALATAN) 0.005 % ophthalmic solution instill 1 drop into both eyes at bedtime 4/15/21  Yes Historical Provider, MD   Arginine 500 MG CAPS Take 2 capsules by mouth 3 times daily    Yes Historical Provider, MD   Omega-3 Fatty Acids (FISH OIL) 1200 MG CAPS Take by mouth 2 times daily   Yes Historical Provider, MD   Multiple Vitamins-Minerals (THERAPEUTIC MULTIVITAMIN-MINERALS) tablet Take 1 tablet by mouth 2 times daily   Yes Historical Provider, MD     No Known Allergies    Social History     Socioeconomic History    Marital status:      Spouse name: Not on file    Number of children: Not on file    Years of education: Not on file    Highest education level: Not on file   Occupational History    Not on file   Tobacco Use    Smoking status: Former     Packs/day: 1.50     Years: 40.00     Pack years: 60.00     Types: Cigarettes     Start date:      Quit date:      Years since quittin.0    Smokeless tobacco: Never   Vaping Use    Vaping Use: Never used   Substance and Sexual Activity    Alcohol use: No     Comment: 6 cups a day of coffee    Drug use: No    Sexual activity: Not on file   Other Topics Concern    Not on file   Social History Narrative    Not on file     Social Determinants of Health     Financial Resource Strain: Low Risk     Difficulty of Paying Living Expenses: Not hard at all   Food Insecurity: No Food Insecurity    Worried About Running Out of Food in the Last Year: Never true    Ran Out of Food in the Last Year: Never true   Transportation Needs: Not on file   Physical Activity: Sufficiently Active    Days of Exercise per Week: 2 days    Minutes of Exercise per Session: 120 min Stress: Not on file   Social Connections: Not on file   Intimate Partner Violence: Not on file   Housing Stability: Not on file     Family History   Problem Relation Age of Onset    No Known Problems Mother     Heart Disease Father     Heart Attack Father     High Blood Pressure Father     High Cholesterol Father     Diabetes Father     Arrhythmia Sister      REVIEW OF SYSTEMS:     Felicia Steiner denies fever/chills, chest pain, shortness of breath, new bowel or bladder complaints. All other review of systems was negative. PHYSICAL EXAMINATION:      /72   Pulse 66   Temp 97.7 °F (36.5 °C) (Infrared)   Ht 5' 10\" (1.778 m)   Wt 183 lb (83 kg)   SpO2 96%   BMI 26.26 kg/m²     General:       General appearance:   elderly, pleasant and well-hydrated. , in mild discomfort and A & O x3  Build:Normal Weight     HEENT:     Head:normocephalic and atraumatic  Sclera: icterus absent,      Lungs:     Breathing:Breathing Pattern: regular, no distress     Abdomen:     Shape:non-distended and normal  Tenderness:none     Lumbar spine:     Spine inspection:normal   CVA tenderness:No   Palpation:tenderness paravertebral muscles  Range of motion:not tested. Musculoskeletal:     Trigger points in Paraveteral:absent bilaterally  SI joint tenderness:positive right, negative left  SLR:negative right, negative left, sitting      Extremities:     Tremors:None bilaterally upper and lower  Range of motion:pain with internal rotation of hips negative. Intact:Yes  Edema:Normal     Neurological:     Sensory:normal to light touch bilateral lower extremities. Motor:                             Right Quadriceps5-/5          Left Quadriceps5-/5           Right Gastrocnemius5-/5    Left Gastrocnemius5-/5  Right Ant Tibialis5-/5  Left Ant Tibialis5-/5  Gait:normal     Dermatology:     Skin:no unusual rashes and no skin lesions     Impression:  Low back pain with radiation to the right buttocks.   Lumbar spine MRI 2019 L2-3/L3-4/L4-5 spinal canal stenosis worst at L3-4 with multilevel facet arthritis. Moderate relief with phyiscal therapy. Bilateral L3,4,5 MB RFA with excellent relief. Plan:  Follow up on his low back pain with no acute issues. Patient is s/p LESI at L3-4 with more than 60% improvement  in his pain, overall he feels much better. Discontinue Gabapentin 300 mg BID(not using). [facility] OARRS report reviewed 01/2023. Patient encouraged to stay active. Treatment plan discussed with the patient. We discussed with the patient that combining opioids, benzodiazepines, alcohol, illicit drugs or sleep aids increases the risk of respiratory depression including death. We discussed that these medications may cause drowsiness, sedation or dizziness and have counseled the patient not to drive or operate machinery. We have discussed that these medications will be prescribed only by one provider. We have discussed with the patient about age related risk factors and have thoroughly discussed the importance of taking these medications as prescribed. The patient verbalizes understanding. ccrefsara Palomares M.D.

## 2023-01-16 RX ORDER — FLUTICASONE PROPIONATE 50 MCG
SPRAY, SUSPENSION (ML) NASAL
Qty: 1 EACH | Refills: 5 | Status: SHIPPED | OUTPATIENT
Start: 2023-01-16

## 2023-02-28 ENCOUNTER — OFFICE VISIT (OUTPATIENT)
Dept: PRIMARY CARE CLINIC | Age: 68
End: 2023-02-28
Payer: MEDICARE

## 2023-02-28 VITALS
BODY MASS INDEX: 26.17 KG/M2 | TEMPERATURE: 99.1 F | HEIGHT: 70 IN | WEIGHT: 182.8 LBS | SYSTOLIC BLOOD PRESSURE: 126 MMHG | DIASTOLIC BLOOD PRESSURE: 66 MMHG | OXYGEN SATURATION: 95 % | HEART RATE: 62 BPM

## 2023-02-28 DIAGNOSIS — I25.10 CAD IN NATIVE ARTERY: Primary | ICD-10-CM

## 2023-02-28 DIAGNOSIS — E78.2 MIXED HYPERLIPIDEMIA: ICD-10-CM

## 2023-02-28 DIAGNOSIS — I10 ESSENTIAL HYPERTENSION: ICD-10-CM

## 2023-02-28 DIAGNOSIS — J43.8 OTHER EMPHYSEMA (HCC): ICD-10-CM

## 2023-02-28 DIAGNOSIS — I25.10 CAD IN NATIVE ARTERY: ICD-10-CM

## 2023-02-28 LAB
BASOPHILS ABSOLUTE: 0.07 E9/L (ref 0–0.2)
BASOPHILS RELATIVE PERCENT: 1 % (ref 0–2)
EOSINOPHILS ABSOLUTE: 0.38 E9/L (ref 0.05–0.5)
EOSINOPHILS RELATIVE PERCENT: 5.4 % (ref 0–6)
HCT VFR BLD CALC: 44 % (ref 37–54)
HEMOGLOBIN: 14.2 G/DL (ref 12.5–16.5)
IMMATURE GRANULOCYTES #: 0.02 E9/L
IMMATURE GRANULOCYTES %: 0.3 % (ref 0–5)
LYMPHOCYTES ABSOLUTE: 2 E9/L (ref 1.5–4)
LYMPHOCYTES RELATIVE PERCENT: 28.3 % (ref 20–42)
MCH RBC QN AUTO: 28.7 PG (ref 26–35)
MCHC RBC AUTO-ENTMCNC: 32.3 % (ref 32–34.5)
MCV RBC AUTO: 88.9 FL (ref 80–99.9)
MONOCYTES ABSOLUTE: 1.12 E9/L (ref 0.1–0.95)
MONOCYTES RELATIVE PERCENT: 15.9 % (ref 2–12)
NEUTROPHILS ABSOLUTE: 3.47 E9/L (ref 1.8–7.3)
NEUTROPHILS RELATIVE PERCENT: 49.1 % (ref 43–80)
PDW BLD-RTO: 13.9 FL (ref 11.5–15)
PLATELET # BLD: 201 E9/L (ref 130–450)
PMV BLD AUTO: 10.3 FL (ref 7–12)
RBC # BLD: 4.95 E12/L (ref 3.8–5.8)
WBC # BLD: 7.1 E9/L (ref 4.5–11.5)

## 2023-02-28 PROCEDURE — 3078F DIAST BP <80 MM HG: CPT | Performed by: INTERNAL MEDICINE

## 2023-02-28 PROCEDURE — 3074F SYST BP LT 130 MM HG: CPT | Performed by: INTERNAL MEDICINE

## 2023-02-28 PROCEDURE — 99213 OFFICE O/P EST LOW 20 MIN: CPT | Performed by: INTERNAL MEDICINE

## 2023-02-28 PROCEDURE — 1123F ACP DISCUSS/DSCN MKR DOCD: CPT | Performed by: INTERNAL MEDICINE

## 2023-02-28 RX ORDER — APRACLONIDINE HYDROCHLORIDE 5 MG/ML
SOLUTION/ DROPS OPHTHALMIC
COMMUNITY
Start: 2023-02-06

## 2023-02-28 RX ORDER — ROSUVASTATIN CALCIUM 20 MG/1
20 TABLET, COATED ORAL DAILY
Qty: 90 TABLET | Refills: 0 | Status: SHIPPED | OUTPATIENT
Start: 2023-02-28

## 2023-02-28 RX ORDER — SILDENAFIL 100 MG/1
TABLET, FILM COATED ORAL
COMMUNITY
Start: 2022-11-28 | End: 2023-02-28 | Stop reason: ALTCHOICE

## 2023-02-28 SDOH — ECONOMIC STABILITY: HOUSING INSECURITY
IN THE LAST 12 MONTHS, WAS THERE A TIME WHEN YOU DID NOT HAVE A STEADY PLACE TO SLEEP OR SLEPT IN A SHELTER (INCLUDING NOW)?: NO

## 2023-02-28 SDOH — ECONOMIC STABILITY: FOOD INSECURITY: WITHIN THE PAST 12 MONTHS, YOU WORRIED THAT YOUR FOOD WOULD RUN OUT BEFORE YOU GOT MONEY TO BUY MORE.: NEVER TRUE

## 2023-02-28 SDOH — ECONOMIC STABILITY: FOOD INSECURITY: WITHIN THE PAST 12 MONTHS, THE FOOD YOU BOUGHT JUST DIDN'T LAST AND YOU DIDN'T HAVE MONEY TO GET MORE.: NEVER TRUE

## 2023-02-28 SDOH — ECONOMIC STABILITY: INCOME INSECURITY: HOW HARD IS IT FOR YOU TO PAY FOR THE VERY BASICS LIKE FOOD, HOUSING, MEDICAL CARE, AND HEATING?: NOT VERY HARD

## 2023-02-28 ASSESSMENT — PATIENT HEALTH QUESTIONNAIRE - PHQ9
SUM OF ALL RESPONSES TO PHQ QUESTIONS 1-9: 0
SUM OF ALL RESPONSES TO PHQ9 QUESTIONS 1 & 2: 0
1. LITTLE INTEREST OR PLEASURE IN DOING THINGS: 0
2. FEELING DOWN, DEPRESSED OR HOPELESS: 0
SUM OF ALL RESPONSES TO PHQ QUESTIONS 1-9: 0

## 2023-02-28 NOTE — PROGRESS NOTES
Chief Complaint   Patient presents with    Hyperlipidemia     Patient still having trouble with feeling like something is in his eye after glaucoma surgery 1 year ago, using lubricating drops every hour       HPI:  Patient is here for follow-up  Patient feels well no cardiopulmonary symptoms compliant on medications and inhaler she gets refills on inhalers from pulmonology. Trelegy was discontinued currently is taking Symbicort inhaler and albuterol as needed. Patient had cataract surgery left eye he also mentioned that he has glaucoma has issues with vision was advised to see his ophthalmologist.  No recent lab work  Patient is seeing orthopedic regarding left elbow and left biceps pain. Past Medical History, Surgical History, and Family History has been reviewed and updated.     Review of Systems:  Constitutional:  No fever, no fatigue, no chills, no headaches, no weight change  Dermatology:  No rash, no mole, no dry or sensitive skin  ENT:  No cough, no sore throat, no sinus pain, no runny nose, no ear pain  Cardiology:  No chest pain, no palpitations, no leg edema, no shortness of breath, no PND  Gastroenterology:  No dysphagia, no abdominal pain, no nausea, no vomiting, no constipation, no diarrhea, no heartburn  Musculoskeletal:  No joint pain, no leg cramps, no back pain, no muscle aches  Respiratory:  No shortness of breath, no orthopnea, no wheezing, no OJEDA, no hemoptysis  Urology:  No blood in the urine, no urinary frequency, no urinary incontinence, no urinary urgency, no nocturia, no dysuria    Vitals:    02/28/23 1139   BP: 126/66   Site: Left Upper Arm   Position: Sitting   Cuff Size: Medium Adult   Pulse: 62   Temp: 99.1 °F (37.3 °C)   TempSrc: Temporal   SpO2: 95%   Weight: 182 lb 12.8 oz (82.9 kg)   Height: 5' 10\" (1.778 m)       General:  Patient alert and oriented x 3, NAD, pleasant  HEENT:  Atraumatic, normocephalic, PERRLA, EOMI, clear conjunctiva, TMs clear, nose-clear, throat - no erythema  Neck:  Supple, no goiter, no carotid bruits, no LAD  Lungs:  CTA   Heart:  RRR, no murmurs, gallops or rubs  Abdomen:  Soft/nt/nd, + bowel sounds  Lymph node examination: unremarkable  Neurological exam : unremarkable  Extremities:  No clubbing, cyanosis or edema  Skin: unremarkable    Hemoglobin A1C   Date Value Ref Range Status   12/23/2020 5.4 4.0 - 5.6 % Final     Cholesterol, Total   Date Value Ref Range Status   06/22/2022 125 0 - 199 mg/dL Final     Triglycerides   Date Value Ref Range Status   06/22/2022 77 0 - 149 mg/dL Final     HDL   Date Value Ref Range Status   06/22/2022 46 >40 mg/dL Final     LDL Calculated   Date Value Ref Range Status   06/22/2022 64 0 - 99 mg/dL Final     VLDL Cholesterol Calculated   Date Value Ref Range Status   06/22/2022 15 mg/dL Final     Sodium   Date Value Ref Range Status   06/22/2022 142 132 - 146 mmol/L Final     Potassium   Date Value Ref Range Status   06/22/2022 3.6 3.5 - 5.0 mmol/L Final     Chloride   Date Value Ref Range Status   06/22/2022 105 98 - 107 mmol/L Final     CO2   Date Value Ref Range Status   06/22/2022 21 (L) 22 - 29 mmol/L Final     BUN   Date Value Ref Range Status   06/22/2022 12 6 - 23 mg/dL Final     Creatinine   Date Value Ref Range Status   06/22/2022 0.8 0.7 - 1.2 mg/dL Final     Glucose   Date Value Ref Range Status   06/22/2022 102 (H) 74 - 99 mg/dL Final     Calcium   Date Value Ref Range Status   06/22/2022 9.0 8.6 - 10.2 mg/dL Final     Total Protein   Date Value Ref Range Status   06/22/2022 7.1 6.4 - 8.3 g/dL Final     Albumin   Date Value Ref Range Status   06/22/2022 4.3 3.5 - 5.2 g/dL Final     Total Bilirubin   Date Value Ref Range Status   06/22/2022 0.7 0.0 - 1.2 mg/dL Final     Alkaline Phosphatase   Date Value Ref Range Status   06/22/2022 53 40 - 129 U/L Final     AST   Date Value Ref Range Status   06/22/2022 27 0 - 39 U/L Final     ALT   Date Value Ref Range Status   06/22/2022 30 0 - 40 U/L Final     GFR Non- American   Date Value Ref Range Status   06/22/2022 >60 >=60 mL/min/1.73 Final     Comment:     Chronic Kidney Disease: less than 60 ml/min/1.73 sq.m. Kidney Failure: less than 15 ml/min/1.73 sq.m. Results valid for patients 18 years and older. GFR    Date Value Ref Range Status   06/22/2022 >60  Final        No results found. Assessment/Plan:   Coronary artery disease stable  Hyperlipidemia  Essential hypertension controlled  COPD controlled.     Outpatient Encounter Medications as of 2/28/2023   Medication Sig Dispense Refill    rosuvastatin (CRESTOR) 20 MG tablet Take 1 tablet by mouth daily 90 tablet 0    apraclonidine (IOPIDINE) 0.5 % ophthalmic solution INSTILL 1 DROP INTOP BOTH EYES TWICE A DAY      [DISCONTINUED] sildenafil (VIAGRA) 100 MG tablet       fluticasone (FLONASE) 50 MCG/ACT nasal spray SPRAY 1 SPRAY INTO EACH NOSTRIL EVERY DAY 1 each 5    [DISCONTINUED] rosuvastatin (CRESTOR) 20 MG tablet Take 1 tablet by mouth daily 90 tablet 0    COMBIGAN 0.2-0.5 % ophthalmic solution Place 1 drop into both eyes 2 times daily      brinzolamide (AZOPT) 1 % ophthalmic suspension Place 1 drop into both eyes nightly      budesonide-formoterol (SYMBICORT) 160-4.5 MCG/ACT AERO Inhale 2 puffs into the lungs 2 times daily 3 each 3    albuterol sulfate  (90 Base) MCG/ACT inhaler Inhale 2 puffs into the lungs every 4 hours as needed for Wheezing or Shortness of Breath 3 each 3    albuterol (PROVENTIL) (2.5 MG/3ML) 0.083% nebulizer solution Take 3 mLs by nebulization every 4 hours as needed for Wheezing 360 each 3    aspirin 81 MG chewable tablet Take 1 tablet by mouth daily 90 tablet 3    latanoprost (XALATAN) 0.005 % ophthalmic solution instill 1 drop into both eyes at bedtime      Arginine 500 MG CAPS Take 2 capsules by mouth 3 times daily       Omega-3 Fatty Acids (FISH OIL) 1200 MG CAPS Take by mouth 2 times daily      Multiple Vitamins-Minerals (THERAPEUTIC MULTIVITAMIN-MINERALS) tablet Take 1 tablet by mouth 2 times daily       No facility-administered encounter medications on file as of 2/28/2023. Andrew Ramires was seen today for hyperlipidemia. Diagnoses and all orders for this visit:    CAD in native artery  -     Comprehensive Metabolic Panel; Future  -     CBC with Auto Differential; Future    Other emphysema (HCC)  -     Comprehensive Metabolic Panel; Future  -     CBC with Auto Differential; Future    Mixed hyperlipidemia  -     LIPID PANEL; Future  -     Comprehensive Metabolic Panel; Future  -     rosuvastatin (CRESTOR) 20 MG tablet; Take 1 tablet by mouth daily    Essential hypertension  -     Comprehensive Metabolic Panel; Future  -     CBC with Auto Differential; Future         There are no Patient Instructions on file for this visit. On this date 2/28/2023 I have spent 25 minutes reviewing previous notes, test results and face to face with the patient discussing the diagnosis and importance of compliance with the treatment plan as well as documenting on the day of the visit.       Falguni Soto MD   2/28/23

## 2023-03-01 LAB
ALBUMIN SERPL-MCNC: 4.6 G/DL (ref 3.5–5.2)
ALP BLD-CCNC: 62 U/L (ref 40–129)
ALT SERPL-CCNC: 28 U/L (ref 0–40)
ANION GAP SERPL CALCULATED.3IONS-SCNC: 13 MMOL/L (ref 7–16)
AST SERPL-CCNC: 30 U/L (ref 0–39)
BILIRUB SERPL-MCNC: 0.7 MG/DL (ref 0–1.2)
BUN BLDV-MCNC: 9 MG/DL (ref 6–23)
CALCIUM SERPL-MCNC: 9.3 MG/DL (ref 8.6–10.2)
CHLORIDE BLD-SCNC: 104 MMOL/L (ref 98–107)
CHOLESTEROL, TOTAL: 118 MG/DL (ref 0–199)
CO2: 25 MMOL/L (ref 22–29)
CREAT SERPL-MCNC: 0.8 MG/DL (ref 0.7–1.2)
GFR SERPL CREATININE-BSD FRML MDRD: >60 ML/MIN/1.73
GLUCOSE BLD-MCNC: 86 MG/DL (ref 74–99)
HDLC SERPL-MCNC: 49 MG/DL
LDL CHOLESTEROL CALCULATED: 58 MG/DL (ref 0–99)
POTASSIUM SERPL-SCNC: 4.6 MMOL/L (ref 3.5–5)
SODIUM BLD-SCNC: 142 MMOL/L (ref 132–146)
TOTAL PROTEIN: 7.1 G/DL (ref 6.4–8.3)
TRIGL SERPL-MCNC: 53 MG/DL (ref 0–149)
VLDLC SERPL CALC-MCNC: 11 MG/DL

## 2023-03-02 ENCOUNTER — OFFICE VISIT (OUTPATIENT)
Dept: CARDIOLOGY CLINIC | Age: 68
End: 2023-03-02
Payer: MEDICARE

## 2023-03-02 VITALS
RESPIRATION RATE: 16 BRPM | HEART RATE: 51 BPM | BODY MASS INDEX: 26.34 KG/M2 | DIASTOLIC BLOOD PRESSURE: 62 MMHG | HEIGHT: 70 IN | WEIGHT: 184 LBS | SYSTOLIC BLOOD PRESSURE: 108 MMHG

## 2023-03-02 DIAGNOSIS — I25.10 CAD IN NATIVE ARTERY: Primary | ICD-10-CM

## 2023-03-02 PROCEDURE — 1123F ACP DISCUSS/DSCN MKR DOCD: CPT | Performed by: INTERNAL MEDICINE

## 2023-03-02 PROCEDURE — 99214 OFFICE O/P EST MOD 30 MIN: CPT | Performed by: INTERNAL MEDICINE

## 2023-03-02 PROCEDURE — 93000 ELECTROCARDIOGRAM COMPLETE: CPT | Performed by: INTERNAL MEDICINE

## 2023-03-02 NOTE — PROGRESS NOTES
[facility] Cardiology Progress Note  Dr. Barrett Artist      Referring Physician: Pao Reece MD  CHIEF COMPLAINT:   Chief Complaint   Patient presents with    Coronary Artery Disease     9 month        HISTORY OF PRESENT ILLNESS:   Patient is 77year old male CAD s/p PCI to LAD in December 2020, is here for follow-up appointment. Occasional shortness of breath when lays flat while working on his car, patient denies any chest pain, no lightheadedness, no dizziness, no palpitations, no pedal edema, no PND, no orthopnea, no syncope, no presyncopal episodes. Physically active without any cardiac complaints    Past Medical History:   Diagnosis Date    Arthritis     Asthma     Breathing difficulty     CAD (coronary artery disease)     COPD (chronic obstructive pulmonary disease) (HCC)     Non-rheumatic tricuspid valve insufficiency     Nonrheumatic mitral (valve) insufficiency          Past Surgical History:   Procedure Laterality Date    APPENDECTOMY      COLONOSCOPY      CORONARY ANGIOPLASTY WITH STENT PLACEMENT  12/22/2020    DR. Mandel Resolute Christopher DUANE 2.25x18 Mid LAD, Resolute Christopher DUANE 2.5x18 Mid Cx.   EF 65%    HERNIA REPAIR      INTRACAPSULAR CATARACT EXTRACTION Left 4/6/2021    LEFT   CATARACT EXTRACTION  IOL IMPLANT I STENT performed by Stepan Sharp MD at [address] [facility]  Bilateral 5/2/2022    BILATERAL L2 L3 L4 MEDIAL BRACH NERVE BLOCK (CPT 10350) performed by Ania Freedman MD at [address] [facility]  Bilateral 6/13/2022    BILATERAL L3 L4 L5 MEDIAL BRANCH NERVE BLOCK performed by Ania Freedman MD at [address] [facility]      right wrist carpal tunnel release, right long trigger finger release    OTHER SURGICAL HISTORY Right 01/20/2016    right shoulder arthroscopy acromioplasty with decompression rotator cuff repair     OTHER SURGICAL HISTORY Left 04/06/2021    LEFT EYE CATERACT EXTRACTION WITH INTRA OCULAR IMPLANT WITH I STENT    PAIN MANAGEMENT PROCEDURE Bilateral 7/11/2022    BILATERAL L3, 4, 5 MEDIAL BRANCH RADIOFREQUENCY ABLATION (CPT 03506) performed by Rock Adame MD at [address] [facility] N/A 12/19/2022    LUMBAR EPIDURAL STEROID INJECTION L3-4 WITH LOW VOLUME performed by Rock Adame MD at [address] [facility] ARTHROSCOPY  3/4/11    left rotator cuff repair         Current Outpatient Medications   Medication Sig Dispense Refill    apraclonidine (IOPIDINE) 0.5 % ophthalmic solution INSTILL 1 DROP INTOP BOTH EYES TWICE A DAY      rosuvastatin (CRESTOR) 20 MG tablet Take 1 tablet by mouth daily 90 tablet 0    fluticasone (FLONASE) 50 MCG/ACT nasal spray SPRAY 1 SPRAY INTO EACH NOSTRIL EVERY DAY 1 each 5    COMBIGAN 0.2-0.5 % ophthalmic solution Place 1 drop into both eyes 2 times daily      brinzolamide (AZOPT) 1 % ophthalmic suspension Place 1 drop into both eyes nightly      budesonide-formoterol (SYMBICORT) 160-4.5 MCG/ACT AERO Inhale 2 puffs into the lungs 2 times daily 3 each 3    albuterol sulfate  (90 Base) MCG/ACT inhaler Inhale 2 puffs into the lungs every 4 hours as needed for Wheezing or Shortness of Breath 3 each 3    albuterol (PROVENTIL) (2.5 MG/3ML) 0.083% nebulizer solution Take 3 mLs by nebulization every 4 hours as needed for Wheezing 360 each 3    aspirin 81 MG chewable tablet Take 1 tablet by mouth daily 90 tablet 3    latanoprost (XALATAN) 0.005 % ophthalmic solution instill 1 drop into both eyes at bedtime      Arginine 500 MG CAPS Take 2 capsules by mouth 3 times daily       Omega-3 Fatty Acids (FISH OIL) 1200 MG CAPS Take by mouth 2 times daily      Multiple Vitamins-Minerals (THERAPEUTIC MULTIVITAMIN-MINERALS) tablet Take 1 tablet by mouth 2 times daily       No current facility-administered medications for this visit.          Allergies as of 03/02/2023    (No Known Allergies)       Social History     Socioeconomic History    Marital status:      Spouse name: Not on file    Number of children: Not on file    Years of education: Not on file    Highest education level: Not on file   Occupational History    Not on file   Tobacco Use    Smoking status: Former     Packs/day: 1.50     Years: 40.00     Pack years: 60.00     Types: Cigarettes     Start date: 56     Quit date: 2016     Years since quittin.1    Smokeless tobacco: Never   Vaping Use    Vaping Use: Never used   Substance and Sexual Activity    Alcohol use: No     Comment: 6 cups a day of coffee    Drug use: No    Sexual activity: Not on file   Other Topics Concern    Not on file   Social History Narrative    Not on file     Social Determinants of Health     Financial Resource Strain: Low Risk     Difficulty of Paying Living Expenses: Not very hard   Food Insecurity: No Food Insecurity    Worried About Running Out of Food in the Last Year: Never true    [address] in the Last Year: Never true   Transportation Needs: Unknown    Lack of Transportation (Medical): Not on file    Lack of Transportation (Non-Medical):  No   Physical Activity: Sufficiently Active    Days of Exercise per Week: 2 days    Minutes of Exercise per Session: 120 min   Stress: Not on file   Social Connections: Not on file   Intimate Partner Violence: Not on file   Housing Stability: Unknown    Unable to Pay for Housing in the Last Year: Not on file    Number of Places Lived in the Last Year: Not on file    Unstable Housing in the Last Year: No       Family History   Problem Relation Age of Onset    No Known Problems Mother     Heart Disease Father     Heart Attack Father     High Blood Pressure Father     High Cholesterol Father     Diabetes Father     Arrhythmia Sister        REVIEW OF SYSTEMS:     CONSTITUTIONAL:  negative for  fevers, chills, sweats, fatigue  HEENT:  negative for  tinnitus, earaches, nasal congestion and epistaxis  RESPIRATORY:  negative for  dry cough, cough with sputum,wheezing and hemoptysis  GASTROINTESTINAL:  negative for nausea, vomiting, diarrhea, constipation, pruritus and jaundice  HEMATOLOGIC/LYMPHATIC:  negative for easy bruising, bleeding, lymphadenopathy and petechiae  ENDOCRINE:  negative for heat intolerance, cold intolerance, tremor, hair loss and diabetic symptoms including neither polyuria nor polydipsia nor blurred vision  MUSCULOSKELETAL:  negative for  myalgias, arthralgias, joint swelling, stiff joints and decreased range of motion  NEUROLOGICAL:  negative for memory problems, speech problems, visual disturbance, dysphagia, weakness and numbness    PHYSICAL EXAM:   Constitutional:  Awake, alert cooperative, no apparent distress, and appears stated age. HEENT:  Moist and pink mucous membranes, normocephalic, without obvious abnormality, atraumatic, normal ears and nose. NECK:  Supple, symmetrical, trachea midline, no JVD, no adenopathy, thyroid symmetric, not enlarged and no tenderness, good carotid upstroke bilaterally, no carotid bruit, skin normal.   LUNGS: No increased work of breathing, good air exchange, clear to auscultation bilaterally, no crackles or wheezing. Cardiovascular: Normal apical impulse, regular rate and rhythm, normal S1 and S2, no S3 or S4, 2/6 systolic murmur at the apex, 2/6 diastolic murmur at the right upper sternal border, 2/6 systolic murmur at the left lower sternal border, no pedal edema, good carotid upstroke bilaterally, no carotid bruit, no JVD, no abdominal pulsating masses. ABDOMEN: Soft, nontender, no hepatomegaly, no splenomegaly, bowel sound positive. Musculoskeletal:  No clubbing or cyanosis. No redness, warmth, or swelling of the joints. Neurological: Alert, awake, and oriented X3   SKIN: No bruises, no bleeding, normal skin color, texture, turgor and no redness, warmth or swelling.     /62   Pulse 51   Resp 16   Ht 5' 10\" (1.778 m)   Wt 184 lb (83.5 kg)   BMI 26.40 kg/m²     DATA:   I personally reviewed the visit EKG with the following interpretation: Sinus bradycardia, right bundle branch block with left anterior fascicular block, no change when compared to previous    EKG 5/10/2022, sinus bradycardia, right bundle branch block with left fascicular block    EKG - 8/27/21 Sinus bradycardia, right bundle branch block, normal axis    ECHO: 3/12/2019) Normal let ventricular systolic function with normal diastolic function. Mild mitral valve regurgitation. Mild tricuspid valve regurgitation with normal RVSP. Stress Test: 12/17/20 Impression:     1. Exercise EKG was abnormal with Downsloping ST depression in   the inferior leads, horizontal ST depression in the anterolateral   leads, with high predictive value for ischemia. 2. The patient experienced Shortness of breath with some   tightness in the chest with exercise. 3. Gusman treadmill score was -9 implying intermediate risk of   acute ischemic events. 4. Exercise capacity was below average. Angiography: 12/22/20 CONCLUSIONS:  1. Coronary artery disease:  a. Left main:  No significant disease. b.  LAD:  20% eccentric proximal vessel narrowing, 50% mid vessel  disease and 80% to 90% mid to distal vessel stenosis after the large  terminal diagonal branch.  c.  LCX:  70% ostial stenosis of a small to moderate first marginal  branch. No significant disease of the large second marginal branch. 70% stenosis of the mid to distal left circumflex. d.  RCA:  Dominant vessel with around 50% to 60% ostial narrowing of the  small posterolateral branch and around 30% ostial narrowing of the large  posterior descending artery branch. 2.  Normal left ventricular size and systolic function with an estimated  ejection fraction of 65%. 3.  Successful balloon angioplasty with the deployment of drug-eluting  coronary stent to the mid to distal LAD with very good results.   4.  Successful balloon angioplasty with the deployment of drug-eluting  coronary stent to the mid to distal left circumflex with very good  results. Cardiology Labs: BMP:    Lab Results   Component Value Date/Time     02/28/2023 12:12 PM    K 4.6 02/28/2023 12:12 PM    K 3.4 12/23/2020 05:01 AM     02/28/2023 12:12 PM    CO2 25 02/28/2023 12:12 PM    BUN 9 02/28/2023 12:12 PM    CREATININE 0.8 02/28/2023 12:12 PM     CMP:    Lab Results   Component Value Date/Time     02/28/2023 12:12 PM    K 4.6 02/28/2023 12:12 PM    K 3.4 12/23/2020 05:01 AM     02/28/2023 12:12 PM    CO2 25 02/28/2023 12:12 PM    BUN 9 02/28/2023 12:12 PM    CREATININE 0.8 02/28/2023 12:12 PM    PROT 7.1 02/28/2023 12:12 PM     CBC:    Lab Results   Component Value Date/Time    WBC 7.1 02/28/2023 12:12 PM    RBC 4.95 02/28/2023 12:12 PM    HGB 14.2 02/28/2023 12:12 PM    HCT 44.0 02/28/2023 12:12 PM    MCV 88.9 02/28/2023 12:12 PM    RDW 13.9 02/28/2023 12:12 PM     02/28/2023 12:12 PM     PT/INR:  No results found for: PTINR  PT/INR Warfarin:  No components found for: PTPATWAR, PTINRWAR  PTT:  No results found for: APTT  PTT Heparin:  No components found for: APTTHEP  Magnesium:    Lab Results   Component Value Date/Time    MG 2.0 12/23/2020 05:01 AM     TSH:  No results found for: TSH  TROPONIN:  No components found for: TROP  BNP:  No results found for: BNP  FASTING LIPID PANEL:    Lab Results   Component Value Date/Time    CHOL 118 02/28/2023 12:12 PM    HDL 49 02/28/2023 12:12 PM    TRIG 53 02/28/2023 12:12 PM     No orders to display     I have personally reviewed the laboratory, cardiac diagnostic and radiographic testing as outlined above:      IMPRESSION:  1: CAD: Patient had cardiac catheterization on 12/22/2020 with the following findings:  # Left main:  No significant disease. # LAD:  20% eccentric proximal vessel narrowing, 50% mid vessel disease and 80% to 90% mid to distal vessel stenosis after the large terminal diagonal branch. # LCX:  70% ostial stenosis of a small to moderate first marginal branch.   No significant disease of the large second marginal branch. 70% stenosis of the mid to distal left circumflex. # RCA:  Dominant vessel with around 50% to 60% ostial narrowing of the small posterolateral branch and around 30% ostial narrowing of the large posterior descending artery branch. # Normal left ventricular size and systolic function with an estimated ejection fraction of 65%. # Successful balloon angioplasty with the deployment of drug-eluting coronary stent to the mid to distal LAD with very good results. # Successful balloon angioplasty with the deployment of drug-eluting coronary stent to the mid to distal left circumflex with very good results. Continue current treatment    2: Mitral valve regurgitation: Mild             3: Tricuspid valve regurgitation: Mild            4: Chronic obstructive pulmonary disease, unspecified              5:  Family history of ischemic heart disease and other diseases: Father had his first heart attack at age              RECOMMENDATIONS:   1. Will discontinue Plavix  2. Continue the rest of medications  3. Preventive cardiology: Low-salt, low-cholesterol diet, daily exercise, total cholesterol of less than 200, LDL of less than 70, were all advised. 4.  Follow-up with  as scheduled  5. Follow-up with Dr. Hawa Arriaza in 9 months, sooner if symptomatic for any reason    I have reviewed my findings and recommendations with patient    Electronically signed by Omero Nicolas MD on 3/2/2023 at 6:28 PM    NOTE: This report was transcribed using voice recognition software.  Every effort was made to ensure accuracy; however, inadvertent computerized transcription errors may be presen

## 2023-05-28 DIAGNOSIS — E78.2 MIXED HYPERLIPIDEMIA: ICD-10-CM

## 2023-05-30 RX ORDER — ROSUVASTATIN CALCIUM 20 MG/1
TABLET, COATED ORAL
Qty: 90 TABLET | Refills: 0 | Status: SHIPPED | OUTPATIENT
Start: 2023-05-30

## 2023-12-19 ENCOUNTER — TELEPHONE (OUTPATIENT)
Dept: ADMINISTRATIVE | Age: 68
End: 2023-12-19

## 2023-12-19 NOTE — TELEPHONE ENCOUNTER
Pt calling states he had an echo at [facility] and states he has a mildly dilated ascending aorta. He is going to drop the report at the [facility] office - 12/20/23.

## 2024-01-15 ENCOUNTER — OFFICE VISIT (OUTPATIENT)
Dept: CARDIOLOGY CLINIC | Age: 69
End: 2024-01-15
Payer: MEDICARE

## 2024-01-15 VITALS
SYSTOLIC BLOOD PRESSURE: 138 MMHG | HEIGHT: 70 IN | BODY MASS INDEX: 26.34 KG/M2 | HEART RATE: 65 BPM | WEIGHT: 184 LBS | DIASTOLIC BLOOD PRESSURE: 76 MMHG | RESPIRATION RATE: 16 BRPM

## 2024-01-15 DIAGNOSIS — I25.10 CAD IN NATIVE ARTERY: Primary | ICD-10-CM

## 2024-01-15 PROCEDURE — 93000 ELECTROCARDIOGRAM COMPLETE: CPT | Performed by: INTERNAL MEDICINE

## 2024-01-15 PROCEDURE — 99214 OFFICE O/P EST MOD 30 MIN: CPT | Performed by: INTERNAL MEDICINE

## 2024-01-15 PROCEDURE — 1123F ACP DISCUSS/DSCN MKR DOCD: CPT | Performed by: INTERNAL MEDICINE

## 2024-01-15 NOTE — PROGRESS NOTES
fatigue  HEENT:  negative for  tinnitus, earaches, nasal congestion and epistaxis  RESPIRATORY:  negative for  dry cough, cough with sputum,wheezing and hemoptysis  GASTROINTESTINAL:  negative for nausea, vomiting, diarrhea, constipation, pruritus and jaundice  HEMATOLOGIC/LYMPHATIC:  negative for easy bruising, bleeding, lymphadenopathy and petechiae  ENDOCRINE:  negative for heat intolerance, cold intolerance, tremor, hair loss and diabetic symptoms including neither polyuria nor polydipsia nor blurred vision  MUSCULOSKELETAL:  negative for  myalgias, arthralgias, joint swelling, stiff joints and decreased range of motion  NEUROLOGICAL:  negative for memory problems, speech problems, visual disturbance, dysphagia, weakness and numbness    PHYSICAL EXAM:   Constitutional:  Awake, alert cooperative, no apparent distress, and appears stated age.   HEENT:  Moist and pink mucous membranes, normocephalic, without obvious abnormality, atraumatic, normal ears and nose.   NECK:  Supple, symmetrical, trachea midline, no JVD, no adenopathy, thyroid symmetric, not enlarged and no tenderness, good carotid upstroke bilaterally, no carotid bruit, skin normal.   LUNGS: No increased work of breathing, good air exchange, clear to auscultation bilaterally, no crackles or wheezing.  Cardiovascular: Normal apical impulse, regular rate and rhythm, normal S1 and S2, no S3 or S4, 2/6 systolic murmur at the apex, 2/6 diastolic murmur at the right upper sternal border, 2/6 systolic murmur at the left lower sternal border, no pedal edema, good carotid upstroke bilaterally, no carotid bruit, no JVD, no abdominal pulsating masses.   ABDOMEN: Soft, nontender, no hepatomegaly, no splenomegaly, bowel sound positive.  Musculoskeletal:  No clubbing or cyanosis.  No redness, warmth, or swelling of the joints.  Neurological: Alert, awake, and oriented X3   SKIN: No bruises, no bleeding, normal skin color, texture, turgor and no redness, warmth or

## 2024-03-12 LAB — PROSTATE SPECIFIC ANTIGEN: 4.5 NG/ML (ref 0–4)

## 2024-04-30 ENCOUNTER — HOSPITAL ENCOUNTER (OUTPATIENT)
Age: 69
Discharge: HOME | End: 2024-04-30
Payer: MEDICARE

## 2024-04-30 LAB — PSA SERPL-MCNC: 5 NG/ML (ref 0–4)

## 2024-04-30 PROCEDURE — 36415 COLL VENOUS BLD VENIPUNCTURE: CPT

## 2024-04-30 PROCEDURE — 84153 ASSAY OF PSA TOTAL: CPT

## 2024-05-24 ENCOUNTER — HOSPITAL ENCOUNTER (OUTPATIENT)
Age: 69
Discharge: HOME | End: 2024-05-24
Payer: MEDICARE

## 2024-05-24 LAB
BILIRUB UR QL STRIP: NEGATIVE
CLARITY UR: CLEAR
COLOR UR: YELLOW
COMMENT: NORMAL
GLUCOSE UR STRIP-MCNC: NEGATIVE MG/DL
HGB UR QL STRIP.AUTO: NEGATIVE
KETONES UR STRIP-MCNC: NEGATIVE MG/DL
LEUKOCYTE ESTERASE UR QL STRIP: NEGATIVE
NITRITE UR QL STRIP: NEGATIVE
PH UR STRIP: 7.5 [PH] (ref 5–9)
PROT UR STRIP-MCNC: NEGATIVE MG/DL
SP GR UR STRIP: 1.01 (ref 1–1.03)
UROBILINOGEN UR STRIP-ACNC: 0.2 EU/DL (ref 0–1)

## 2024-05-24 PROCEDURE — 81003 URINALYSIS AUTO W/O SCOPE: CPT

## 2024-05-24 PROCEDURE — 87086 URINE CULTURE/COLONY COUNT: CPT

## 2024-05-25 LAB
MICROORGANISM SPEC CULT: NO GROWTH
SPECIMEN DESCRIPTION: NORMAL

## 2024-06-03 ENCOUNTER — HOSPITAL ENCOUNTER (OUTPATIENT)
Age: 69
Discharge: HOME | End: 2024-06-05

## 2024-06-12 LAB — SURGICAL PATHOLOGY REPORT: NORMAL

## 2024-07-10 ENCOUNTER — OFFICE VISIT (OUTPATIENT)
Dept: PULMONOLOGY | Age: 69
End: 2024-07-10
Payer: MEDICARE

## 2024-07-10 VITALS
HEART RATE: 60 BPM | BODY MASS INDEX: 28.05 KG/M2 | SYSTOLIC BLOOD PRESSURE: 138 MMHG | TEMPERATURE: 98 F | OXYGEN SATURATION: 98 % | DIASTOLIC BLOOD PRESSURE: 66 MMHG | HEIGHT: 69 IN | RESPIRATION RATE: 18 BRPM | WEIGHT: 189.4 LBS

## 2024-07-10 DIAGNOSIS — J43.8 OTHER EMPHYSEMA (HCC): Primary | ICD-10-CM

## 2024-07-10 DIAGNOSIS — Z87.891 PERSONAL HISTORY OF TOBACCO USE: ICD-10-CM

## 2024-07-10 LAB
EXPIRATORY TIME: NORMAL
FEF 25-75% %PRED-PRE: NORMAL
FEF 25-75% PRED: NORMAL
FEF 25-75-PRE: NORMAL
FEV1 %PRED-PRE: 71 %
FEV1 PRED: 3.16 L
FEV1/FVC %PRED-PRE: 83 %
FEV1/FVC PRED: 76 %
FEV1/FVC: 64 %
FEV1: 2.25 L
FVC %PRED-PRE: 85 %
FVC PRED: 4.15 L
FVC: 3.55 L
PEF %PRED-PRE: NORMAL
PEF PRED: NORMAL
PEF-PRE: NORMAL

## 2024-07-10 PROCEDURE — 99213 OFFICE O/P EST LOW 20 MIN: CPT | Performed by: INTERNAL MEDICINE

## 2024-07-10 PROCEDURE — 94010 BREATHING CAPACITY TEST: CPT | Performed by: INTERNAL MEDICINE

## 2024-07-10 PROCEDURE — 1123F ACP DISCUSS/DSCN MKR DOCD: CPT | Performed by: INTERNAL MEDICINE

## 2024-07-10 PROCEDURE — G0296 VISIT TO DETERM LDCT ELIG: HCPCS | Performed by: INTERNAL MEDICINE

## 2024-07-10 RX ORDER — ALBUTEROL SULFATE 90 UG/1
2 AEROSOL, METERED RESPIRATORY (INHALATION) EVERY 4 HOURS PRN
Qty: 3 EACH | Refills: 3 | Status: SHIPPED | OUTPATIENT
Start: 2024-07-10

## 2024-07-10 RX ORDER — BUDESONIDE AND FORMOTEROL FUMARATE DIHYDRATE 160; 4.5 UG/1; UG/1
2 AEROSOL RESPIRATORY (INHALATION) 2 TIMES DAILY
Qty: 3 EACH | Refills: 3 | Status: SHIPPED | OUTPATIENT
Start: 2024-07-10

## 2024-07-10 ASSESSMENT — PULMONARY FUNCTION TESTS
FEV1_PREDICTED: 3.16
FEV1/FVC_PREDICTED: 76
FEV1_PERCENT_PREDICTED_PRE: 71
FVC_PREDICTED: 4.15
FVC: 3.55
FEV1/FVC_PERCENT_PREDICTED_PRE: 83
FVC_PERCENT_PREDICTED_PRE: 85
FEV1/FVC: 64
FEV1: 2.25

## 2024-07-10 NOTE — PATIENT INSTRUCTIONS
Hoa Alva    [facility]  [address] [place] [id number]  Office: [phone]      Your were seen in the office today for COPD      We  did not make changes to your medications today.   Refills were ordered for the symbicort and albuterol.   Recommend over the counter voltaren gel for the back pain or lidocaine patches    Testing ordered today was CT scan of the chest      Vaccines recommended none      Follow up in 6 months              Please do not hesitate to call the office with any questions.     Learning About Lung Cancer Screening  What is screening for lung cancer?     Lung cancer screening is a way to find some lung cancers early, before a person has any symptoms of the cancer.  Lung cancer screening may help those who have the highest risk for lung cancer--people age 50 and older who are or were heavy smokers. For most people, who aren't at increased risk, screening for lung cancer probably isn't helpful.  Screening won't prevent cancer. And it may not find all lung cancers. Lung cancer screening may lower the risk of dying from lung cancer in a small number of people.  How is it done?  Lung cancer screening is done with a low-dose CT (computed tomography) scan. A CT scan uses X-rays, or radiation, to make detailed pictures of your body. Experts recommend that screening be done in medical centers that focus on finding and treating lung cancer.  Who is screening recommended for?  Lung cancer screening is recommended for people age 50 and older who are or were heavy smokers. That means people with a smoking history of at least 20 pack years. A pack year is a way to measure how heavy a smoker you are or were.  To figure out your pack years, multiply how many packs a day on average (assuming 20 cigarettes per pack) you have smoked by how many years you have smoked. For example:  If you smoked 1 pack a day for 20 years, that's 1 times 20. So you have a smoking

## 2024-07-24 NOTE — PROGRESS NOTES
Spirometry 7/10/24 shows FEV1/FVC ratio or 64. FVC is 3.55 L 85% of predicted. FEV1 is 2.25L 71% predicted.     Consistent with moderate obstruction.   Hoa Alva, DO    
Starting screening at age 55 is not likely to increase cancer risk from radiation exposure.    Patients with comorbidities resulting in life expectancy of < 10 years, or that would preclude treatment of an abnormality identified on CT, should not be screened due to lack of benefit.    To obtain maximal benefit from this screening, smoking cessation and long-term abstinence from smoking is critical          Low Dose CT (LDCT) Lung Screening criteria met:     Age 50-77(Medicare) or 50-80 ([facility])   Pack year smoking >20   Still smoking or less than 15 year since quit   No sign or symptoms of lung cancer   > 11 months since last LDCT     Risks and benefits of lung cancer screening with LDCT scans discussed:    Significance of positive screen - False-positive LDCT results often occur. 95% of all positive results do not lead to a diagnosis of cancer. Usually further imaging can resolve most false-positive results; however, some patients may require invasive procedures.    Over diagnosis risk - 10% to 12% of screen-detected lung cancer cases are over diagnosed--that is, the cancer would not have been detected in the patient's lifetime without the screening.    Need for follow up screens annually to continue lung cancer screening effectiveness     Risks associated with radiation from annual LDCT- Radiation exposure is about the same as for a mammogram, which is about 1/3 of the annual background radiation exposure from everyday life.  Starting screening at age 55 is not likely to increase cancer risk from radiation exposure.    Patients with comorbidities resulting in life expectancy of < 10 years, or that would preclude treatment of an abnormality identified on CT, should not be screened due to lack of benefit.    To obtain maximal benefit from this screening, smoking cessation and long-term abstinence from smoking is critical              Discussed with the patient the current USPSTF guidelines released March 9, 2021 for

## 2024-08-07 ENCOUNTER — HOSPITAL ENCOUNTER (OUTPATIENT)
Dept: CT IMAGING | Age: 69
Discharge: HOME | End: 2024-08-07
Attending: INTERNAL MEDICINE
Payer: MEDICARE

## 2024-08-07 DIAGNOSIS — Z87.891 PERSONAL HISTORY OF TOBACCO USE: ICD-10-CM

## 2024-08-07 PROCEDURE — 71271 CT THORAX LUNG CANCER SCR C-: CPT

## 2024-12-16 ENCOUNTER — HOSPITAL ENCOUNTER (OUTPATIENT)
Age: 69
Discharge: HOME | End: 2024-12-16
Payer: MEDICARE

## 2024-12-16 LAB — PSA SERPL-MCNC: 8.92 NG/ML (ref 0–4)

## 2024-12-16 PROCEDURE — 36415 COLL VENOUS BLD VENIPUNCTURE: CPT

## 2024-12-16 PROCEDURE — 84153 ASSAY OF PSA TOTAL: CPT

## 2025-01-01 NOTE — TELEPHONE ENCOUNTER
Bedside report received from day shift RN  Praveena . Pt is currently A&Ox4, SR, breathing at a normal rate and rhythm, warm, dry, and skin color appropriate for ethnicity, and in no visible emotional or physical distress. Bed locked in lowest position, call light is within reach, fall prevention measures in place. Pt educated to use call light before getting out of bed, pt verbalized understanding. Pt is on a cardiac monitor, order verified, transmitter connected appropriately, and leads placed correctly, rhythm and rate assessed by RN, monitor staff updated of changes and parameters as necessary.  Report given to assisting staff - CNA/CCT/ACT    Fall Risk Score: HIGH RISK  Fall risk interventions in place: Place yellow fall risk ID band on patient, Provide patient/family education based on risk assessment, Educate patient/family to call staff for assistance when getting out of bed, Place fall precaution signage outside patient door, Place patient in room close to nursing station, Utilize bed/chair fall alarm, Notify charge of high risk for huddle, and Bed alarm connected correctly  Bed type: Regular (En Score less than 17 interventions in place)  Patient on cardiac monitor: Yes  IVF/IV medications: Not Applicable   Oxygen: Room Air  Bedside sitter: Not Applicable   Isolation: Not applicable       No call, encounter opened to process CT Lung Screening. CT Lung Screen: 10/12/2021      Impression   Moderate emphysematous changes with multiple calcified granulomas throughout   both lungs.  No evidence of a noncalcified pulmonary nodule.       Incidental findings as above.       LUNG RADS:   Per ACR Lung-RADS Version 1.1       Category 2, Benign appearance or behavior.       Management:  Continue annual lung screening with LDCT in 12 months.       RECOMMENDATIONS:   If you would like to register your patient with the [facility], please contact the Nurse Navigator at   [phone]. Pack years: 61    Social History     Tobacco Use  Smoking Status: Former Smoker   Start Date: 1969   Quit Date: 2016   Types: Cigarettes   Packs/Day: 1.5   Years: 36   Pack Years: 61   Smokeless Tobacco: Never Used         Results letter sent to patient via my chart or mailed.      [address]

## 2025-01-17 ENCOUNTER — OFFICE VISIT (OUTPATIENT)
Dept: CARDIOLOGY CLINIC | Age: 70
End: 2025-01-17
Payer: MEDICARE

## 2025-01-17 VITALS
SYSTOLIC BLOOD PRESSURE: 146 MMHG | WEIGHT: 187 LBS | HEART RATE: 53 BPM | DIASTOLIC BLOOD PRESSURE: 70 MMHG | BODY MASS INDEX: 27.7 KG/M2 | HEIGHT: 69 IN | RESPIRATION RATE: 16 BRPM

## 2025-01-17 DIAGNOSIS — R47.81 SLURRED SPEECH: ICD-10-CM

## 2025-01-17 DIAGNOSIS — I25.10 CAD IN NATIVE ARTERY: Primary | ICD-10-CM

## 2025-01-17 PROCEDURE — 1123F ACP DISCUSS/DSCN MKR DOCD: CPT | Performed by: INTERNAL MEDICINE

## 2025-01-17 PROCEDURE — 93000 ELECTROCARDIOGRAM COMPLETE: CPT | Performed by: INTERNAL MEDICINE

## 2025-01-17 PROCEDURE — 99214 OFFICE O/P EST MOD 30 MIN: CPT | Performed by: INTERNAL MEDICINE

## 2025-01-17 NOTE — PROGRESS NOTES
[facility] Cardiology Progress Note  Dr. Kwan Meek      Referring Physician: Norman Breaux, DONNY - CNP  CHIEF COMPLAINT:   Chief Complaint   Patient presents with    Follow-up    Coronary Artery Disease       HISTORY OF PRESENT ILLNESS:   Patient is 69 year old male CAD s/p PCI to LAD in December 2020, is here for follow-up appointment.  Occasional shortness of breath when lays flat while working on his car, patient denies any chest pain, no lightheadedness, no dizziness, no palpitations, no pedal edema, no PND, no orthopnea, no syncope, no presyncopal episodes.  not as active as he used to be.  Had couple episodes of slurred speech that lasted for couple of minutes, resolved spontaneously    Past Medical History:   Diagnosis Date    Arthritis     Asthma     Breathing difficulty     CAD (coronary artery disease)     COPD (chronic obstructive pulmonary disease) (HCC)     Non-rheumatic tricuspid valve insufficiency     Nonrheumatic mitral (valve) insufficiency          Past Surgical History:   Procedure Laterality Date    APPENDECTOMY      COLONOSCOPY      CORONARY ANGIOPLASTY WITH STENT PLACEMENT  12/22/2020    DR. Mandel Resolute Christopher DUANE 2.25x18 Mid LAD, Resolute Stanley DUANE 2.5x18 Mid Cx.  EF 65%    HERNIA REPAIR      INTRACAPSULAR CATARACT EXTRACTION Left 4/6/2021    LEFT   CATARACT EXTRACTION  IOL IMPLANT I STENT performed by Charlie BENITEZ MD at [facility] OR    NERVE BLOCK Bilateral 5/2/2022    BILATERAL L2 L3 L4 MEDIAL BRACH NERVE BLOCK (CPT 04673) performed by Damaso Astorga MD at [facility] OR    NERVE BLOCK Bilateral 6/13/2022    BILATERAL L3 L4 L5 MEDIAL BRANCH NERVE BLOCK performed by Damaso Astorga MD at [facility] OR    OTHER SURGICAL HISTORY      right wrist carpal tunnel release, right long trigger finger release    OTHER SURGICAL HISTORY Right 01/20/2016    right shoulder arthroscopy acromioplasty with decompression rotator cuff repair     OTHER SURGICAL HISTORY Left 04/06/2021    LEFT EYE

## 2025-01-21 ENCOUNTER — TELEPHONE (OUTPATIENT)
Dept: INTERVENTIONAL RADIOLOGY/VASCULAR | Age: 70
End: 2025-01-21

## 2025-01-21 NOTE — TELEPHONE ENCOUNTER
Spoke with patient and confirmed vascular testing appointment on 01/22/2025 at 08:30 am. Instructed patient to arrive 15 minutes prior to appointment time, Enter through Entrance B, register to right of entrance, and report to Cardiac Services for test. Patient verbalized understanding. Questions answered.

## 2025-01-22 ENCOUNTER — HOSPITAL ENCOUNTER (OUTPATIENT)
Dept: INTERVENTIONAL RADIOLOGY/VASCULAR | Age: 70
Discharge: HOME | End: 2025-01-24
Attending: INTERNAL MEDICINE
Payer: MEDICARE

## 2025-01-22 DIAGNOSIS — R47.81 SLURRED SPEECH: ICD-10-CM

## 2025-01-22 PROCEDURE — 93880 EXTRACRANIAL BILAT STUDY: CPT

## 2025-02-05 ENCOUNTER — HOSPITAL ENCOUNTER (OUTPATIENT)
Age: 70
Discharge: HOME | End: 2025-02-05
Payer: MEDICARE

## 2025-02-05 LAB
BILIRUB UR QL STRIP: NEGATIVE
CLARITY UR: CLEAR
COLOR UR: YELLOW
COMMENT: NORMAL
GLUCOSE UR STRIP-MCNC: NEGATIVE MG/DL
HGB UR QL STRIP.AUTO: NEGATIVE
KETONES UR STRIP-MCNC: NEGATIVE MG/DL
LEUKOCYTE ESTERASE UR QL STRIP: NEGATIVE
NITRITE UR QL STRIP: NEGATIVE
PH UR STRIP: 6.5 [PH] (ref 5–8)
PROT UR STRIP-MCNC: NEGATIVE MG/DL
SP GR UR STRIP: 1.01 (ref 1–1.03)
UROBILINOGEN UR STRIP-ACNC: 0.2 EU/DL (ref 0–1)

## 2025-02-05 PROCEDURE — 87086 URINE CULTURE/COLONY COUNT: CPT

## 2025-02-05 PROCEDURE — 81003 URINALYSIS AUTO W/O SCOPE: CPT

## 2025-02-06 LAB
MICROORGANISM SPEC CULT: NO GROWTH
SPECIMEN DESCRIPTION: NORMAL

## 2025-02-10 ENCOUNTER — TELEPHONE (OUTPATIENT)
Dept: CARDIOLOGY CLINIC | Age: 70
End: 2025-02-10

## 2025-02-10 NOTE — TELEPHONE ENCOUNTER
Patient need cardiac clearance for prostate biopsy. Date of surgery 2/13/2025. Patient needs to hold ASA 7 days prior to surgery. Last seen in office  1/17/25.     Please advise   [phone]

## 2025-02-10 NOTE — TELEPHONE ENCOUNTER
Patient is at acceptable risk for perioperative cardiovascular event for the planned procedure (prostate biopsy), patient may proceed without any further cardiac testing.  Recommend continuing aspirin perioperatively, however, if that is not possible, okay to hold aspirin for 7 days prior to the procedure, to be resumed postprocedure when deemed safe from urology standpoint  Please feel free to call for any further information

## 2025-02-13 ENCOUNTER — HOSPITAL ENCOUNTER (OUTPATIENT)
Age: 70
Discharge: HOME | End: 2025-02-15

## 2025-02-19 LAB — SURGICAL PATHOLOGY REPORT: NORMAL

## 2025-03-07 ENCOUNTER — SCHEDULED TELEPHONE ENCOUNTER (OUTPATIENT)
Dept: PULMONOLOGY | Age: 70
End: 2025-03-07

## 2025-03-07 DIAGNOSIS — Z87.891 PERSONAL HISTORY OF TOBACCO USE: ICD-10-CM

## 2025-03-07 DIAGNOSIS — J43.8 OTHER EMPHYSEMA (HCC): Primary | ICD-10-CM

## 2025-03-07 NOTE — PROGRESS NOTES
[facility]     HISTORY OF PRESENT ILLNESS:    Wilber Yusuf is a 69 y.o. year old male here for evaluation of shortness of breath, history of emphysema.  The patient reports that overall in the last year he has noticed increased dyspnea with exertion.  He reports that he recently had stents placed in his heart after undergoing a stress test.  Initially after having the procedure he participated in cardiac rehab and reports that his breathing did feel better than however he now notices increased shortness of breath with exertion and example of which is when he walks to clocking in the morning and then at other times while he is exerting himself throughout the day.  He denies any fevers or chills.  Denies a productive cough.  He is currently prescribed Trelegy however feels that when he was taking Symbicort and Spiriva in the past this was more helpful.  He also denies any wheezing.  He is a former smoker and reports he quit 5 years ago.  He sees Dr. Meek from cardiology and reports that he thinks that he has had an echocardiogram there recently.  He has not been vaccinated against COVID-19      Updated HPI as of 12/30/2021.  Patient currently denies any symptoms.  He reports that he did have a Covid test that was positive on December 21, 2021.  He reports that at that time his symptoms consisted mostly of congestion and cough.  His daughter also tested positive at the same time.  Overall he reports that his shortness of breath has improved.  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  He currently denies any coughing, wheezing, or dyspnea with exertion.  He states that he also feels that the Symbicort has helped with his breathing.  He is currently on Symbicort, Spiriva, and albuterol as needed.  He is having difficulty affording his prescriptions and we will refer him to the prescription assistance

## 2025-03-26 LAB — SURGICAL PATHOLOGY REPORT: NORMAL

## 2025-03-28 ENCOUNTER — HOSPITAL ENCOUNTER (OUTPATIENT)
Age: 70
Discharge: HOME | End: 2025-03-28
Payer: MEDICARE

## 2025-03-28 LAB
ALBUMIN SERPL-MCNC: 4.4 G/DL (ref 3.5–5.2)
ALP SERPL-CCNC: 69 U/L (ref 40–129)
ALT SERPL-CCNC: 47 U/L (ref 0–40)
ANION GAP SERPL CALCULATED.3IONS-SCNC: 11 MMOL/L (ref 7–16)
AST SERPL-CCNC: 38 U/L (ref 0–39)
BASOPHILS # BLD: 0.07 K/UL (ref 0–0.2)
BASOPHILS NFR BLD: 1 % (ref 0–2)
BILIRUB SERPL-MCNC: 0.3 MG/DL (ref 0–1.2)
BUN SERPL-MCNC: 24 MG/DL (ref 6–23)
CALCIUM SERPL-MCNC: 9.5 MG/DL (ref 8.6–10.2)
CHLORIDE SERPL-SCNC: 106 MMOL/L (ref 98–107)
CO2 SERPL-SCNC: 25 MMOL/L (ref 22–29)
CREAT SERPL-MCNC: 0.9 MG/DL (ref 0.7–1.2)
EOSINOPHIL # BLD: 0.37 K/UL (ref 0.05–0.5)
EOSINOPHILS RELATIVE PERCENT: 5 % (ref 0–6)
ERYTHROCYTE [DISTWIDTH] IN BLOOD BY AUTOMATED COUNT: 14.5 % (ref 11.5–15)
GFR, ESTIMATED: >90 ML/MIN/1.73M2
GLUCOSE SERPL-MCNC: 91 MG/DL (ref 74–99)
HCT VFR BLD AUTO: 42.5 % (ref 37–54)
HGB BLD-MCNC: 13.9 G/DL (ref 12.5–16.5)
IMM GRANULOCYTES # BLD AUTO: 0.03 K/UL (ref 0–0.58)
IMM GRANULOCYTES NFR BLD: 0 % (ref 0–5)
LYMPHOCYTES NFR BLD: 2.48 K/UL (ref 1.5–4)
LYMPHOCYTES RELATIVE PERCENT: 30 % (ref 20–42)
MCH RBC QN AUTO: 28.9 PG (ref 26–35)
MCHC RBC AUTO-ENTMCNC: 32.7 G/DL (ref 32–34.5)
MCV RBC AUTO: 88.4 FL (ref 80–99.9)
MONOCYTES NFR BLD: 1.26 K/UL (ref 0.1–0.95)
MONOCYTES NFR BLD: 15 % (ref 2–12)
NEUTROPHILS NFR BLD: 49 % (ref 43–80)
NEUTS SEG NFR BLD: 4.02 K/UL (ref 1.8–7.3)
PLATELET # BLD AUTO: 191 K/UL (ref 130–450)
PMV BLD AUTO: 9.3 FL (ref 7–12)
POTASSIUM SERPL-SCNC: 4 MMOL/L (ref 3.5–5)
PROT SERPL-MCNC: 7.2 G/DL (ref 6.4–8.3)
RBC # BLD AUTO: 4.81 M/UL (ref 3.8–5.8)
SODIUM SERPL-SCNC: 142 MMOL/L (ref 132–146)
WBC OTHER # BLD: 8.2 K/UL (ref 4.5–11.5)

## 2025-03-28 PROCEDURE — 85025 COMPLETE CBC W/AUTO DIFF WBC: CPT

## 2025-03-28 PROCEDURE — 80053 COMPREHEN METABOLIC PANEL: CPT

## 2025-03-28 PROCEDURE — 36415 COLL VENOUS BLD VENIPUNCTURE: CPT

## 2025-05-14 DIAGNOSIS — J43.8 OTHER EMPHYSEMA (HCC): ICD-10-CM

## 2025-05-14 RX ORDER — ALBUTEROL SULFATE 90 UG/1
INHALANT RESPIRATORY (INHALATION)
Qty: 54 EACH | Refills: 3 | Status: SHIPPED | OUTPATIENT
Start: 2025-05-14

## 2025-06-18 ENCOUNTER — TELEPHONE (OUTPATIENT)
Dept: CARDIOLOGY CLINIC | Age: 70
End: 2025-06-18

## 2025-06-18 NOTE — TELEPHONE ENCOUNTER
Tried to contact patient to reschedule appointment on 7/23/2025. No answer No voicemail. Unable to leave message.

## 2025-06-30 ENCOUNTER — TELEPHONE (OUTPATIENT)
Dept: CARDIOLOGY CLINIC | Age: 70
End: 2025-06-30

## 2025-06-30 NOTE — TELEPHONE ENCOUNTER
Called patient. Couldn't leave voicemail. The reason I'm calling is to reschedule his appointment because the provider is out of office.

## (undated) DEVICE — 6 ML SYRINGE LUER-LOCK TIP: Brand: MONOJECT

## (undated) DEVICE — ENCORE® LATEX MICRO SIZE 8.5, STERILE LATEX POWDER-FREE SURGICAL GLOVE: Brand: ENCORE

## (undated) DEVICE — 3 ML SYRINGE LUER-LOCK TIP: Brand: MONOJECT

## (undated) DEVICE — APPLICATOR MEDICATED 10.5 CC SOLUTION HI LT ORNG CHLORAPREP

## (undated) DEVICE — SOLUTION SCRB 32OZ 7.5% POVIDONE IOD BTL GENTLE EFFECTIVE

## (undated) DEVICE — GLOVE ORANGE PI 7 1/2   MSG9075

## (undated) DEVICE — Z DISCONTINUED APPLICATOR SURG PREP 0.35OZ 2% CHG 70% ISO ALC W/ HI LT

## (undated) DEVICE — BANDAGE ADH W1XL3IN NAT FAB WVN FLX DURABLE N ADH PD SEAL

## (undated) DEVICE — 3M™ RED DOT™ MONITORING ELECTRODE WITH FOAM TAPE AND STICKY GEL 2560, 50/BAG, 20/CASE, 72/PLT: Brand: RED DOT™

## (undated) DEVICE — NEEDLE HYPO 18GA L1.5IN PNK POLYPR HUB S STL THN WALL FILL

## (undated) DEVICE — NEEDLE HYPO 27GA L1.25IN GRY POLYPR HUB S STL REG BVL STR

## (undated) DEVICE — STERILE POLYISOPRENE POWDER-FREE SURGICAL GLOVES: Brand: PROTEXIS

## (undated) DEVICE — GAUZE,SPONGE,4"X4",12PLY,STERILE,LF,2'S: Brand: MEDLINE

## (undated) DEVICE — CVD CANNULA

## (undated) DEVICE — SOLUTION IV IRRIG POUR BRL 0.9% SODIUM CHL 2F7124

## (undated) DEVICE — TOWEL,OR,DSP,ST,BLUE,STD,6/PK,12PK/CS: Brand: MEDLINE

## (undated) DEVICE — SUTURE ETHLN 10-0 L12IN NONABSORBABLE BLK CS140-8 L6.5MM 9033G

## (undated) DEVICE — NEEDLE SPNL 25GA L3.5IN BLU HUB S STL REG WALL FIT STYL W/

## (undated) DEVICE — 12 ML SYRINGE,LUER-LOCK TIP: Brand: MONOJECT

## (undated) DEVICE — ELECTRODE SURG MPLR NEUT SELF ADH PT PLT MULTIGEN

## (undated) DEVICE — Device

## (undated) DEVICE — SURGICAL PROCEDURE PACK CATRCT LT EYE BASIC CUST ST JOS LF

## (undated) DEVICE — NEEDLE SPNL 25GA L2IN BLU SHT NEO LUM PUNC DISP

## (undated) DEVICE — SOLUTION IV IRRIG WATER 1000ML POUR BRL 2F7114